# Patient Record
Sex: FEMALE | Race: WHITE | NOT HISPANIC OR LATINO | Employment: FULL TIME | URBAN - METROPOLITAN AREA
[De-identification: names, ages, dates, MRNs, and addresses within clinical notes are randomized per-mention and may not be internally consistent; named-entity substitution may affect disease eponyms.]

---

## 2017-03-23 ENCOUNTER — LAB REQUISITION (OUTPATIENT)
Dept: LAB | Facility: HOSPITAL | Age: 32
End: 2017-03-23
Payer: COMMERCIAL

## 2017-03-23 DIAGNOSIS — Z01.419 ENCOUNTER FOR GYNECOLOGICAL EXAMINATION WITHOUT ABNORMAL FINDING: ICD-10-CM

## 2017-03-23 DIAGNOSIS — Z11.51 ENCOUNTER FOR SCREENING FOR HUMAN PAPILLOMAVIRUS (HPV): ICD-10-CM

## 2017-03-23 PROCEDURE — G0145 SCR C/V CYTO,THINLAYER,RESCR: HCPCS | Performed by: OBSTETRICS & GYNECOLOGY

## 2017-03-30 LAB
LAB AP GYN PRIMARY INTERPRETATION: NORMAL
LAB AP LMP: NORMAL
Lab: NORMAL
PATH INTERP SPEC-IMP: NORMAL

## 2017-12-28 ENCOUNTER — ALLSCRIPTS OFFICE VISIT (OUTPATIENT)
Dept: OTHER | Facility: OTHER | Age: 32
End: 2017-12-28

## 2017-12-29 NOTE — PROGRESS NOTES
Assessment   1  Acute pharyngitis (462) (J02 9)    Plan   Acute pharyngitis    · Start: Amoxicillin 875 MG Oral Tablet; TAKE 1 TABLET EVERY 12 HOURS DAILY  Health Maintenance    · Temporarily Stop: Fluzone Quadrivalent 0 5 ML Intramuscular Suspension Prefilled    Syringe    Discussion/Summary      Discussed supportive measures and discussed with patient to call for any new or worsening symptoms  Possible side effects of new medications were reviewed with the patient/guardian today  The treatment plan was reviewed with the patient/guardian  The patient/guardian understands and agrees with the treatment plan      Chief Complaint   Sore throat      History of Present Illness   HPI: Patient here to establish care and reports that she is having sore throat and is prone to strep and usually getting twice a year she reports positive sick contacts with her son  Patient is able to swallow eating and drinking no n/v/d  Review of Systems        Constitutional: as noted in HPI       ENT: as noted in HPI  Cardiovascular: no complaints of slow or fast heart rate, no chest pain, no palpitations, no leg claudication or lower extremity edema  Respiratory: no complaints of shortness of breath, no wheezing, no dyspnea on exertion, no orthopnea or PND  Gastrointestinal: no complaints of abdominal pain, no constipation, no nausea or diarrhea, no vomiting, no bloody stools  ROS reviewed  Past Medical History   Active Problems And Past Medical History Reviewed: The active problems and past medical history were reviewed and updated today  Family History   Family History Reviewed: The family history was reviewed and updated today  Social History    · Never a smoker  The social history was reviewed and updated today  Surgical History   Surgical History Reviewed: The surgical history was reviewed and updated today  Current Meds   1   Lo Loestrin Fe 1 MG-10 MCG / 10 MCG Oral Tablet; take one tablet daily; Therapy: [id number] to (Last Cesar Hamm)  Requested for: [id number] Ordered     The medication list was reviewed and updated today  Allergies   1  No Known Drug Allergies    Vitals    Recorded: [id number] 01:57PM   Temperature 99 5 F   Heart Rate 88   Systolic 488   Diastolic 68   Height 5 ft 3 in   Weight 208 lb 8 0 oz   BMI Calculated 36 93   BSA Calculated 1 97   O2 Saturation 99     Physical Exam        Constitutional      General appearance: No acute distress, well appearing and well nourished  Eyes      Conjunctiva and lids: No swelling, erythema or discharge  Pupils and irises: Equal, round and reactive to light  Ears, Nose, Mouth, and Throat      External inspection of ears and nose: Normal        Otoscopic examination: Abnormal   The right tympanic membrane was bulging  The left tympanic membrane was bulging  Nasal mucosa, septum, and turbinates: Abnormal   normal nasal septum  There was a mucoid discharge from both nares  The bilateral nasal mucosa was boggy  Oropharynx: Abnormal   The posterior pharynx was erythematous-- and-- had an exudate  Inspection of the oropharynx showed visible hard and soft palate and base of the uvula (Mallampati class 3)  Pulmonary      Respiratory effort: No increased work of breathing or signs of respiratory distress  Auscultation of lungs: Clear to auscultation  Cardiovascular      Auscultation of heart: Normal rate and rhythm, normal S1 and S2, without murmurs  Abdomen      Abdomen: Non-tender, no masses  Liver and spleen: No hepatomegaly or splenomegaly  Lymphatic      Palpation of lymph nodes in neck: No lymphadenopathy         Psychiatric      Orientation to person, place, and time: Normal        Mood and affect: Normal           Signatures    Electronically signed by : Amanda Orellana; Dec 28 2017  2:39PM EST                       (Author)     Electronically signed by : Ja Mendoza MD; Dec 28 2017  3:51PM EST                       (Co-author)

## 2018-01-23 VITALS
OXYGEN SATURATION: 99 % | SYSTOLIC BLOOD PRESSURE: 122 MMHG | DIASTOLIC BLOOD PRESSURE: 68 MMHG | HEIGHT: 63 IN | WEIGHT: 208.5 LBS | TEMPERATURE: 99.5 F | BODY MASS INDEX: 36.94 KG/M2 | HEART RATE: 88 BPM

## 2018-01-29 ENCOUNTER — OFFICE VISIT (OUTPATIENT)
Dept: FAMILY MEDICINE CLINIC | Facility: CLINIC | Age: 33
End: 2018-01-29
Payer: COMMERCIAL

## 2018-01-29 VITALS
DIASTOLIC BLOOD PRESSURE: 82 MMHG | TEMPERATURE: 97.3 F | SYSTOLIC BLOOD PRESSURE: 118 MMHG | HEART RATE: 84 BPM | HEIGHT: 63 IN | OXYGEN SATURATION: 98 % | BODY MASS INDEX: 36.68 KG/M2 | WEIGHT: 207 LBS

## 2018-01-29 DIAGNOSIS — J04.0 LARYNGITIS, ACUTE: Primary | ICD-10-CM

## 2018-01-29 DIAGNOSIS — Z20.828 EXPOSURE TO INFLUENZA: ICD-10-CM

## 2018-01-29 LAB
SL AMB POCT RAPID FLU A: NORMAL
SL AMB POCT RAPID FLU B: NORMAL

## 2018-01-29 PROCEDURE — 99213 OFFICE O/P EST LOW 20 MIN: CPT | Performed by: FAMILY MEDICINE

## 2018-01-29 PROCEDURE — 87804 INFLUENZA ASSAY W/OPTIC: CPT | Performed by: FAMILY MEDICINE

## 2018-01-29 RX ORDER — OSELTAMIVIR PHOSPHATE 75 MG/1
75 CAPSULE ORAL DAILY
Qty: 7 CAPSULE | Refills: 0 | Status: SHIPPED | OUTPATIENT
Start: 2018-01-29 | End: 2018-02-05

## 2018-01-29 RX ORDER — AZITHROMYCIN 250 MG/1
TABLET, FILM COATED ORAL
Qty: 6 TABLET | Refills: 0 | Status: SHIPPED | OUTPATIENT
Start: 2018-01-29 | End: 2018-02-03

## 2018-01-29 NOTE — LETTER
January 29, 2018     Patient: Lucie Dior   YOB: 1985   Date of Visit: 1/29/2018       To Whom it May Concern:    Anish Garrido is under my professional care  She was seen in my office on 1/29/2018  She may return to work on 1/30/18  If you have any questions or concerns, please don't hesitate to call           Sincerely,          Judge Lynda MD        CC: Lucie Taboria

## 2018-01-29 NOTE — PROGRESS NOTES
Assessment/Plan:    No problem-specific Assessment & Plan notes found for this encounter  Diagnoses and all orders for this visit:    Laryngitis, acute  -     azithromycin (ZITHROMAX) 250 mg tablet; Take 2 tablets today then 1 tablet daily x 4 days    Exposure to influenza  -     oseltamivir (TAMIFLU) 75 mg capsule; Take 1 capsule (75 mg total) by mouth daily for 7 days        Laryngitis is viral   Rest voice, warm beverages  Zithromax if symptoms fail to improve  You do not need to take this now  She was exposed to flu - her child has it - will treat w/ prophylactic Tamiflu  Subjective:      Patient ID: Álvaro Brown is a 34 y o  female  Here for hoarseness  States 2 weeks ago was treated with Amoxicillin for strep throat  She did improve  She states she had some Amoxicillin left at home so she restarted this   Her son has influenza currently - was diagnosed this morning  Patient has no fever, cough  The following portions of the patient's history were reviewed and updated as appropriate: She  has no past medical history on file  She  does not have any pertinent problems on file  She  has no past surgical history on file  Her family history includes Asthma in her mother; Hypertension in her father  She  reports that she has never smoked  She has never used smokeless tobacco  Her alcohol and drug histories are not on file  Current Outpatient Prescriptions   Medication Sig Dispense Refill    azithromycin (ZITHROMAX) 250 mg tablet Take 2 tablets today then 1 tablet daily x 4 days 6 tablet 0    oseltamivir (TAMIFLU) 75 mg capsule Take 1 capsule (75 mg total) by mouth daily for 7 days 7 capsule 0     No current facility-administered medications for this visit  No current outpatient prescriptions on file prior to visit  No current facility-administered medications on file prior to visit  She has No Known Allergies       Review of Systems   Constitutional: Negative  HENT: Negative for rhinorrhea, sinus pain, sinus pressure and sore throat  Hoarseness   Respiratory: Negative for cough  Objective:     Physical Exam   Constitutional: She is oriented to person, place, and time  She appears well-developed and well-nourished  No distress  HENT:   Right Ear: External ear normal    Left Ear: External ear normal    Mouth/Throat: Oropharynx is clear and moist    Voice is hoarse   Eyes: Pupils are equal, round, and reactive to light  Cardiovascular: Normal rate, regular rhythm and normal heart sounds  Pulmonary/Chest: Effort normal and breath sounds normal  No respiratory distress  She has no wheezes  She has no rales  She exhibits no tenderness  Neurological: She is alert and oriented to person, place, and time  Skin: She is not diaphoretic  Psychiatric: She has a normal mood and affect   Her behavior is normal  Judgment and thought content normal

## 2018-02-05 ENCOUNTER — OFFICE VISIT (OUTPATIENT)
Dept: FAMILY MEDICINE CLINIC | Facility: CLINIC | Age: 33
End: 2018-02-05
Payer: COMMERCIAL

## 2018-02-05 VITALS
HEART RATE: 98 BPM | WEIGHT: 208.4 LBS | BODY MASS INDEX: 36.93 KG/M2 | SYSTOLIC BLOOD PRESSURE: 120 MMHG | RESPIRATION RATE: 16 BRPM | HEIGHT: 63 IN | DIASTOLIC BLOOD PRESSURE: 70 MMHG | OXYGEN SATURATION: 98 % | TEMPERATURE: 98.3 F

## 2018-02-05 DIAGNOSIS — R05.9 COUGH: ICD-10-CM

## 2018-02-05 DIAGNOSIS — J04.0 LARYNGITIS, ACUTE: Primary | ICD-10-CM

## 2018-02-05 PROCEDURE — 99213 OFFICE O/P EST LOW 20 MIN: CPT | Performed by: FAMILY MEDICINE

## 2018-02-05 PROCEDURE — 3008F BODY MASS INDEX DOCD: CPT | Performed by: FAMILY MEDICINE

## 2018-02-05 RX ORDER — DEXTROMETHORPHAN HYDROBROMIDE AND PROMETHAZINE HYDROCHLORIDE 15; 6.25 MG/5ML; MG/5ML
5 SYRUP ORAL 4 TIMES DAILY PRN
Qty: 118 ML | Refills: 2 | Status: SHIPPED | OUTPATIENT
Start: 2018-02-05 | End: 2018-02-12

## 2018-02-05 RX ORDER — FLUTICASONE PROPIONATE 50 MCG
1 SPRAY, SUSPENSION (ML) NASAL DAILY
Qty: 16 G | Refills: 0 | Status: SHIPPED | OUTPATIENT
Start: 2018-02-05 | End: 2018-10-08

## 2018-02-05 NOTE — PROGRESS NOTES
Assessment/Plan:    No problem-specific Assessment & Plan notes found for this encounter  Diagnoses and all orders for this visit:    Laryngitis, acute  -     fluticasone (FLONASE) 50 mcg/act nasal spray; 1 spray into each nostril daily  -     loratadine-pseudoephedrine (CLARITIN-D 12-HOUR) 5-120 mg per tablet; Take 1 tablet by mouth 2 (two) times a day for 5 days  -     promethazine-dextromethorphan (PHENERGAN-DM) 6 25-15 mg/5 mL oral syrup; Take 5 mL by mouth 4 (four) times a day as needed for cough for up to 7 days    Cough  -     fluticasone (FLONASE) 50 mcg/act nasal spray; 1 spray into each nostril daily  -     loratadine-pseudoephedrine (CLARITIN-D 12-HOUR) 5-120 mg per tablet; Take 1 tablet by mouth 2 (two) times a day for 5 days  -     promethazine-dextromethorphan (PHENERGAN-DM) 6 25-15 mg/5 mL oral syrup; Take 5 mL by mouth 4 (four) times a day as needed for cough for up to 7 days      After explaining risks and benefits of medication along with side effects I have prescribed for her Fluticasone nasal spray  Promethazine and Claritin D for symptoms relief  Given her on going symptoms I have recommend for her to take azithromycin  I have explained to her that a cough can linger for several weeks  Subjective:      Patient ID: Dimitris Almeida is a 34 y o  female  Cough  Patient complains of nonproductive cough and sore throat  Symptoms began 2 weeks ago  Symptoms have been gradually worsening since that time  The cough is dry, hoarse and nonproductive and is aggravated by nothing  Associated symptoms include: change in voice  Patient has not had a previous chest x-ray  She was evaluated on Friday here at this time office  Thus far she has been treated with a course of amoxicillin for her throat, given a course of Tamiflu and a script for azithromycin             The following portions of the patient's history were reviewed and updated as appropriate:   She  has no past medical history on file   She  does not have any pertinent problems on file  She  has no past surgical history on file  Her family history includes Asthma in her mother; Hypertension in her father  She  reports that she has never smoked  She has never used smokeless tobacco  Her alcohol and drug histories are not on file  Current Outpatient Prescriptions   Medication Sig Dispense Refill    fluticasone (FLONASE) 50 mcg/act nasal spray 1 spray into each nostril daily 16 g 0    loratadine-pseudoephedrine (CLARITIN-D 12-HOUR) 5-120 mg per tablet Take 1 tablet by mouth 2 (two) times a day for 5 days 10 tablet 0    oseltamivir (TAMIFLU) 75 mg capsule Take 1 capsule (75 mg total) by mouth daily for 7 days 7 capsule 0    promethazine-dextromethorphan (PHENERGAN-DM) 6 25-15 mg/5 mL oral syrup Take 5 mL by mouth 4 (four) times a day as needed for cough for up to 7 days 118 mL 2     No current facility-administered medications for this visit  Current Outpatient Prescriptions on File Prior to Visit   Medication Sig    oseltamivir (TAMIFLU) 75 mg capsule Take 1 capsule (75 mg total) by mouth daily for 7 days     No current facility-administered medications on file prior to visit  She has No Known Allergies       Review of Systems   Constitutional: Negative for activity change  HENT: Positive for sore throat  Eyes: Negative for discharge  Respiratory: Positive for cough  Cardiovascular: Negative for chest pain  Skin: Negative for rash  Allergic/Immunologic: Negative for environmental allergies  Objective:     Physical Exam   Constitutional: She is oriented to person, place, and time  She appears well-developed and well-nourished  No distress  HENT:   Head: Normocephalic and atraumatic  Nose: Nose normal    Mouth/Throat: Oropharynx is clear and moist  No oropharyngeal exudate  Bilateral TM effusion   Eyes: Conjunctivae are normal  Pupils are equal, round, and reactive to light     Cardiovascular: Normal rate, regular rhythm and normal heart sounds  No murmur heard  Pulmonary/Chest: Effort normal and breath sounds normal  No respiratory distress  She has no wheezes  Abdominal: Soft  Bowel sounds are normal  She exhibits no distension  There is no tenderness  Neurological: She is alert and oriented to person, place, and time  Skin: Skin is warm and dry  No rash noted  She is not diaphoretic  No erythema

## 2018-02-05 NOTE — LETTER
February 5, 2018     Patient: Akash Hathaway   YOB: 1985   Date of Visit: 2/5/2018       To Whom it May Concern:    Brooklyn Hung is under my professional care  She was seen in my office on 2/5/2018  She may return to work on 02/06/2018  If you have any questions or concerns, please don't hesitate to call           Sincerely,          Mila Berger MD        CC: No Recipients

## 2018-02-05 NOTE — LETTER
February 5, 2018     Patient: Yohannes Alan   YOB: 1985   Date of Visit: 2/5/2018       To Whom it May Concern:    Lana Rosa is under my professional care  She was seen in my office on 2/5/2018  She may return to work on 02/06/2018  If you have any questions or concerns, please don't hesitate to call           Sincerely,          Tony Ponce MD        CC: No Recipients

## 2018-10-08 ENCOUNTER — OFFICE VISIT (OUTPATIENT)
Dept: OBGYN CLINIC | Facility: MEDICAL CENTER | Age: 33
End: 2018-10-08
Payer: COMMERCIAL

## 2018-10-08 VITALS — DIASTOLIC BLOOD PRESSURE: 78 MMHG | BODY MASS INDEX: 36.7 KG/M2 | SYSTOLIC BLOOD PRESSURE: 138 MMHG | WEIGHT: 207.2 LBS

## 2018-10-08 DIAGNOSIS — Z01.419 ENCOUNTER FOR GYNECOLOGICAL EXAMINATION (GENERAL) (ROUTINE) WITHOUT ABNORMAL FINDINGS: Primary | ICD-10-CM

## 2018-10-08 PROCEDURE — S0612 ANNUAL GYNECOLOGICAL EXAMINA: HCPCS | Performed by: PHYSICIAN ASSISTANT

## 2018-10-08 NOTE — PROGRESS NOTES
Assessment/Plan  Problem List Items Addressed This Visit     Encounter for gynecological examination (general) (routine) without abnormal findings - Primary     Annual exam performed  Referred patient to [facility] since she has been trying for more than 3 years  RTO 1 year for annual or sooner if any questions about [facility]               Abigail Tillman is a 28 y o  female who presents for a new patient annual GYN exam   Periods are regular every 23-25 days  Dysmenorrhea:mild, occurring first 1-2 days of flow  She denies intermenstrual bleeding, spotting, or discharge  She reports that she is sexually active with 1 partner and using none for contraception  Patient states that she has been trying to conceive second child x 3 years  Patient with same partner and states that it only took her 3-5 months to conceive first child  Patient was doing ovulation tests and did get positive tests  Last Pap smear: per patient; 3/2017 pap WN on care everywhere  Regular self breast exam: no    Family history of uterine or ovarian cancer: no  Family history of breast cancer: no  Family history of colon cancer: no    Menstrual History:  OB History      Para Term  AB Living    2 1            SAB TAB Ectopic Multiple Live Births                     Obstetric Comments    1 miscarriage          Patient's last menstrual period was 2018 (exact date)  Period Pattern: (!) Irregular  Menstrual Flow: Heavy    History reviewed  No pertinent past medical history  History reviewed  No pertinent surgical history  Family History   Problem Relation Age of Onset    Asthma Mother     Hypertension Father     Hyperlipidemia Father        Review of Systems  Review of Systems   Constitutional: Negative for chills and fever  Respiratory: Negative for shortness of breath  Cardiovascular: Negative for chest pain  Gastrointestinal: Negative for abdominal pain     Genitourinary: Negative for dysuria, pelvic pain, vaginal bleeding, vaginal discharge and vaginal pain  Negative for breast pain or lumps  Negative for stress urinary incontinence  Neurological: Negative for headaches  Objective   /78 (BP Location: Right arm)   Wt 94 kg (207 lb 3 2 oz)   LMP 09/25/2018 (Exact Date)   BMI 36 70 kg/m²     Physical Exam   Constitutional: She is oriented to person, place, and time  She appears well-developed and well-nourished  Neck: No thyromegaly present  Cardiovascular: Normal rate and regular rhythm  Pulmonary/Chest: Effort normal and breath sounds normal  Right breast exhibits no mass, no nipple discharge, no skin change and no tenderness  Left breast exhibits no mass, no nipple discharge, no skin change and no tenderness  Abdominal: Soft  There is no tenderness  There is no rebound and no guarding  Genitourinary: There is no rash or lesion on the right labia  There is no rash or lesion on the left labia  Uterus is not enlarged and not tender  Cervix exhibits no motion tenderness and no discharge  Right adnexum displays no mass and no tenderness  Left adnexum displays no mass and no tenderness  No bleeding in the vagina  No vaginal discharge found  Neurological: She is alert and oriented to person, place, and time  Psychiatric: She has a normal mood and affect  Nursing note and vitals reviewed

## 2018-10-08 NOTE — ASSESSMENT & PLAN NOTE
Annual exam performed  Referred patient to [facility] since she has been trying for more than 3 years  RTO 1 year for annual or sooner if any questions about [facility]

## 2019-02-03 ENCOUNTER — HOSPITAL ENCOUNTER (EMERGENCY)
Facility: HOSPITAL | Age: 34
Discharge: HOME | End: 2019-02-03
Admitting: EMERGENCY MEDICINE
Payer: COMMERCIAL

## 2019-02-03 ENCOUNTER — APPOINTMENT (EMERGENCY)
Dept: CT IMAGING | Facility: HOSPITAL | Age: 34
End: 2019-02-03
Payer: COMMERCIAL

## 2019-02-03 VITALS
OXYGEN SATURATION: 97 % | HEIGHT: 63 IN | TEMPERATURE: 97.8 F | RESPIRATION RATE: 18 BRPM | HEART RATE: 64 BPM | SYSTOLIC BLOOD PRESSURE: 153 MMHG | BODY MASS INDEX: 35.44 KG/M2 | WEIGHT: 200 LBS | DIASTOLIC BLOOD PRESSURE: 87 MMHG

## 2019-02-03 DIAGNOSIS — M54.50 LOW BACK PAIN: ICD-10-CM

## 2019-02-03 DIAGNOSIS — R10.9 LEFT FLANK PAIN: Primary | ICD-10-CM

## 2019-02-03 LAB
ALBUMIN SERPL BCP-MCNC: 3.6 G/DL (ref 3.5–5)
ALP SERPL-CCNC: 67 U/L (ref 46–116)
ALT SERPL W P-5'-P-CCNC: 29 U/L (ref 12–78)
ANION GAP SERPL CALCULATED.3IONS-SCNC: 11 MMOL/L (ref 4–13)
AST SERPL W P-5'-P-CCNC: 20 U/L (ref 5–45)
BASOPHILS # BLD AUTO: 0.06 THOUSANDS/ΜL (ref 0–0.1)
BASOPHILS NFR BLD AUTO: 1 % (ref 0–1)
BILIRUB SERPL-MCNC: 0.2 MG/DL (ref 0.2–1)
BILIRUB UR QL STRIP: NEGATIVE
BUN SERPL-MCNC: 18 MG/DL (ref 5–25)
CALCIUM SERPL-MCNC: 9.1 MG/DL (ref 8.3–10.1)
CHLORIDE SERPL-SCNC: 104 MMOL/L (ref 100–108)
CLARITY UR: CLEAR
CO2 SERPL-SCNC: 24 MMOL/L (ref 21–32)
COLOR UR: YELLOW
CREAT SERPL-MCNC: 1.05 MG/DL (ref 0.6–1.3)
EOSINOPHIL # BLD AUTO: 0.36 THOUSAND/ΜL (ref 0–0.61)
EOSINOPHIL NFR BLD AUTO: 4 % (ref 0–6)
ERYTHROCYTE [DISTWIDTH] IN BLOOD BY AUTOMATED COUNT: 13.4 % (ref 11.6–15.1)
EXT PREG TEST URINE: NEGATIVE
GFR SERPL CREATININE-BSD FRML MDRD: 69 ML/MIN/1.73SQ M
GLUCOSE SERPL-MCNC: 107 MG/DL (ref 65–140)
GLUCOSE UR STRIP-MCNC: NEGATIVE MG/DL
HCT VFR BLD AUTO: 44.1 % (ref 34.8–46.1)
HGB BLD-MCNC: 14.5 G/DL (ref 11.5–15.4)
HGB UR QL STRIP.AUTO: NEGATIVE
IMM GRANULOCYTES # BLD AUTO: 0.05 THOUSAND/UL (ref 0–0.2)
IMM GRANULOCYTES NFR BLD AUTO: 1 % (ref 0–2)
KETONES UR STRIP-MCNC: NEGATIVE MG/DL
LEUKOCYTE ESTERASE UR QL STRIP: NEGATIVE
LYMPHOCYTES # BLD AUTO: 1.85 THOUSANDS/ΜL (ref 0.6–4.47)
LYMPHOCYTES NFR BLD AUTO: 20 % (ref 14–44)
MCH RBC QN AUTO: 30 PG (ref 26.8–34.3)
MCHC RBC AUTO-ENTMCNC: 32.9 G/DL (ref 31.4–37.4)
MCV RBC AUTO: 91 FL (ref 82–98)
MONOCYTES # BLD AUTO: 0.65 THOUSAND/ΜL (ref 0.17–1.22)
MONOCYTES NFR BLD AUTO: 7 % (ref 4–12)
NEUTROPHILS # BLD AUTO: 6.29 THOUSANDS/ΜL (ref 1.85–7.62)
NEUTS SEG NFR BLD AUTO: 67 % (ref 43–75)
NITRITE UR QL STRIP: NEGATIVE
NRBC BLD AUTO-RTO: 0 /100 WBCS
PH UR STRIP.AUTO: 5 [PH] (ref 4.5–8)
PLATELET # BLD AUTO: 293 THOUSANDS/UL (ref 149–390)
PMV BLD AUTO: 10 FL (ref 8.9–12.7)
POTASSIUM SERPL-SCNC: 4.2 MMOL/L (ref 3.5–5.3)
PROT SERPL-MCNC: 7.8 G/DL (ref 6.4–8.2)
PROT UR STRIP-MCNC: NEGATIVE MG/DL
RBC # BLD AUTO: 4.83 MILLION/UL (ref 3.81–5.12)
SODIUM SERPL-SCNC: 139 MMOL/L (ref 136–145)
SP GR UR STRIP.AUTO: >=1.03 (ref 1–1.03)
UROBILINOGEN UR QL STRIP.AUTO: 0.2 E.U./DL
WBC # BLD AUTO: 9.26 THOUSAND/UL (ref 4.31–10.16)

## 2019-02-03 PROCEDURE — 80053 COMPREHEN METABOLIC PANEL: CPT | Performed by: NURSE PRACTITIONER

## 2019-02-03 PROCEDURE — 99284 EMERGENCY DEPT VISIT MOD MDM: CPT

## 2019-02-03 PROCEDURE — 81003 URINALYSIS AUTO W/O SCOPE: CPT | Performed by: NURSE PRACTITIONER

## 2019-02-03 PROCEDURE — 74176 CT ABD & PELVIS W/O CONTRAST: CPT

## 2019-02-03 PROCEDURE — 81025 URINE PREGNANCY TEST: CPT | Performed by: NURSE PRACTITIONER

## 2019-02-03 PROCEDURE — 36415 COLL VENOUS BLD VENIPUNCTURE: CPT | Performed by: NURSE PRACTITIONER

## 2019-02-03 PROCEDURE — 96375 TX/PRO/DX INJ NEW DRUG ADDON: CPT

## 2019-02-03 PROCEDURE — 96374 THER/PROPH/DIAG INJ IV PUSH: CPT

## 2019-02-03 PROCEDURE — 85025 COMPLETE CBC W/AUTO DIFF WBC: CPT | Performed by: NURSE PRACTITIONER

## 2019-02-03 PROCEDURE — 96361 HYDRATE IV INFUSION ADD-ON: CPT

## 2019-02-03 RX ORDER — HYDROCODONE BITARTRATE AND ACETAMINOPHEN 5; 325 MG/1; MG/1
1 TABLET ORAL EVERY 6 HOURS PRN
Qty: 20 TABLET | Refills: 0 | Status: SHIPPED | OUTPATIENT
Start: 2019-02-03 | End: 2019-04-16 | Stop reason: ALTCHOICE

## 2019-02-03 RX ORDER — NAPROXEN 500 MG/1
500 TABLET ORAL 2 TIMES DAILY WITH MEALS
Qty: 60 TABLET | Refills: 0 | Status: SHIPPED | OUTPATIENT
Start: 2019-02-03 | End: 2019-04-16 | Stop reason: ALTCHOICE

## 2019-02-03 RX ORDER — MORPHINE SULFATE 4 MG/ML
4 INJECTION, SOLUTION INTRAMUSCULAR; INTRAVENOUS ONCE
Status: COMPLETED | OUTPATIENT
Start: 2019-02-03 | End: 2019-02-03

## 2019-02-03 RX ORDER — KETOROLAC TROMETHAMINE 30 MG/ML
15 INJECTION, SOLUTION INTRAMUSCULAR; INTRAVENOUS ONCE
Status: COMPLETED | OUTPATIENT
Start: 2019-02-03 | End: 2019-02-03

## 2019-02-03 RX ORDER — LIDOCAINE 50 MG/G
1 PATCH TOPICAL ONCE
Status: DISCONTINUED | OUTPATIENT
Start: 2019-02-03 | End: 2019-02-03 | Stop reason: HOSPADM

## 2019-02-03 RX ORDER — LIDOCAINE 50 MG/G
1 PATCH TOPICAL DAILY
Qty: 6 PATCH | Refills: 0 | Status: SHIPPED | OUTPATIENT
Start: 2019-02-03 | End: 2019-04-16 | Stop reason: ALTCHOICE

## 2019-02-03 RX ORDER — METHOCARBAMOL 500 MG/1
500 TABLET, FILM COATED ORAL 2 TIMES DAILY
Qty: 60 TABLET | Refills: 0 | Status: SHIPPED | OUTPATIENT
Start: 2019-02-03 | End: 2019-04-16 | Stop reason: ALTCHOICE

## 2019-02-03 RX ORDER — ONDANSETRON 2 MG/ML
4 INJECTION INTRAMUSCULAR; INTRAVENOUS ONCE
Status: COMPLETED | OUTPATIENT
Start: 2019-02-03 | End: 2019-02-03

## 2019-02-03 RX ADMIN — KETOROLAC TROMETHAMINE 15 MG: 30 INJECTION, SOLUTION INTRAMUSCULAR at 07:30

## 2019-02-03 RX ADMIN — LIDOCAINE 1 PATCH: 50 PATCH TOPICAL at 11:00

## 2019-02-03 RX ADMIN — MORPHINE SULFATE 4 MG: 4 INJECTION INTRAVENOUS at 07:30

## 2019-02-03 RX ADMIN — ONDANSETRON 4 MG: 2 INJECTION INTRAMUSCULAR; INTRAVENOUS at 07:30

## 2019-02-03 RX ADMIN — SODIUM CHLORIDE 1000 ML: 0.9 INJECTION, SOLUTION INTRAVENOUS at 07:30

## 2019-02-03 NOTE — ED PROVIDER NOTES
History  Chief Complaint   Patient presents with    Flank Pain     Patient c/o left sided flank pain that started Thursday night     27-year-old female who presents here with a chief complaint of left-sided flank pain that started yesterday evening  She denies any injury but reports that the pain waxes and wanes  She has a history of low back issues reportedly but this feels different than usual no nausea no vomiting denies any genitourinary complaints  Has some family history of kidney stones so we will scan her to evaluate for kidney stones she does have some left CVA tenderness  None       History reviewed  No pertinent past medical history  History reviewed  No pertinent surgical history  Family History   Problem Relation Age of Onset    Asthma Mother     Hypertension Father     Hyperlipidemia Father      I have reviewed and agree with the history as documented  Social History   Substance Use Topics    Smoking status: Never Smoker    Smokeless tobacco: Never Used    Alcohol use No        Review of Systems   Constitutional: Negative for activity change, fatigue and fever  HENT: Negative for congestion, ear pain, rhinorrhea and sore throat  Eyes: Negative  Respiratory: Negative for cough, shortness of breath and wheezing  Gastrointestinal: Positive for abdominal pain  Negative for diarrhea, nausea and vomiting  Endocrine: Negative  Genitourinary: Negative for difficulty urinating, dyspareunia, dysuria, flank pain, frequency, menstrual problem, pelvic pain, urgency, vaginal bleeding, vaginal discharge and vaginal pain  Musculoskeletal: Negative for arthralgias and myalgias  Skin: Negative for color change and pallor  Neurological: Negative for dizziness, speech difficulty, weakness and headaches  Hematological: Negative for adenopathy  Psychiatric/Behavioral: Negative for confusion         Physical Exam  Physical Exam   Constitutional: She is oriented to person, place, and time  She appears well-developed and well-nourished  She is cooperative  Non-toxic appearance  She does not have a sickly appearance  She does not appear ill  No distress  HENT:   Head: Normocephalic and atraumatic  Right Ear: Tympanic membrane and external ear normal    Left Ear: Tympanic membrane and external ear normal    Nose: No rhinorrhea, sinus tenderness or nasal deformity  No epistaxis  Right sinus exhibits no maxillary sinus tenderness and no frontal sinus tenderness  Left sinus exhibits no maxillary sinus tenderness and no frontal sinus tenderness  Mouth/Throat: Oropharynx is clear and moist and mucous membranes are normal  Normal dentition  Eyes: Pupils are equal, round, and reactive to light  EOM are normal    Neck: Normal range of motion  Neck supple  Cardiovascular: Normal rate, regular rhythm and normal heart sounds  No murmur heard  Pulmonary/Chest: Effort normal and breath sounds normal  No accessory muscle usage  No respiratory distress  She has no wheezes  She has no rales  She exhibits no tenderness  Abdominal: Soft  She exhibits no distension  There is tenderness  There is CVA tenderness  There is no guarding  Musculoskeletal: Normal range of motion  She exhibits no edema or tenderness  Lymphadenopathy:     She has no cervical adenopathy  Neurological: She is alert and oriented to person, place, and time  She exhibits normal muscle tone  Skin: Skin is warm and dry  No rash noted  No erythema  Psychiatric: She has a normal mood and affect  Nursing note and vitals reviewed        Vital Signs  ED Triage Vitals   Temperature Pulse Respirations Blood Pressure SpO2   02/03/19 0648 02/03/19 0648 02/03/19 0648 02/03/19 0648 02/03/19 0648   97 8 °F (36 6 °C) 68 18 (!) 172/112 99 %      Temp Source Heart Rate Source Patient Position - Orthostatic VS BP Location FiO2 (%)   02/03/19 0648 02/03/19 0648 02/03/19 0648 02/03/19 0648 --   Oral Monitor Sitting Right arm       Pain Score       02/03/19 0730       8           Vitals:    02/03/19 0648 02/03/19 0700 02/03/19 0857 02/03/19 1025   BP: (!) 172/112 (!) 172/112 139/81 153/87   Pulse: 68  71 64   Patient Position - Orthostatic VS: Sitting  Sitting Sitting       Visual Acuity      ED Medications  Medications   sodium chloride 0 9 % bolus 1,000 mL (0 mL Intravenous Stopped 2/3/19 1058)   ketorolac (TORADOL) injection 15 mg (15 mg Intravenous Given 2/3/19 0730)   morphine (PF) 4 mg/mL injection 4 mg (4 mg Intravenous Given 2/3/19 0730)   ondansetron (ZOFRAN) injection 4 mg (4 mg Intravenous Given 2/3/19 0730)       Diagnostic Studies  Results Reviewed     Procedure Component Value Units Date/Time    UA w Reflex to Microscopic w Reflex to Culture [[id number]] Collected:  02/03/19 0856    Lab Status:  Final result Specimen:  Urine from Urine, Clean Catch Updated:  02/03/19 0903     Color, UA Yellow     Clarity, UA Clear     Specific Gravity, UA >=1 030     pH, UA 5 0     Leukocytes, UA Negative     Nitrite, UA Negative     Protein, UA Negative mg/dl      Glucose, UA Negative mg/dl      Ketones, UA Negative mg/dl      Urobilinogen, UA 0 2 E U /dl      Bilirubin, UA Negative     Blood, UA Negative    POCT pregnancy, urine [[id number]]  (Normal) Resulted:  02/03/19 0901    Lab Status:  Final result Updated:  02/03/19 0901     EXT PREG TEST UR (Ref: Negative) negative    Comprehensive metabolic panel [[id number]] Collected:  02/03/19 0804    Lab Status:  Final result Specimen:  Blood from Arm, Right Updated:  02/03/19 0827     Sodium 139 mmol/L      Potassium 4 2 mmol/L      Chloride 104 mmol/L      CO2 24 mmol/L      ANION GAP 11 mmol/L      BUN 18 mg/dL      Creatinine 1 05 mg/dL      Glucose 107 mg/dL      Calcium 9 1 mg/dL      AST 20 U/L      ALT 29 U/L      Alkaline Phosphatase 67 U/L      Total Protein 7 8 g/dL      Albumin 3 6 g/dL      Total Bilirubin 0 20 mg/dL      eGFR 69 ml/min/1 73sq m     Narrative:         Consolidated [employer] Education [employer] recommendations are as follows:  GFR calculation is accurate only with a steady state creatinine  Chronic Kidney disease less than 60 ml/min/1 73 sq  meters  Kidney failure less than 15 ml/min/1 73 sq  meters  CBC and differential [[id number]] Collected:  02/03/19 0727    Lab Status:  Final result Specimen:  Blood from Arm, Right Updated:  02/03/19 0733     WBC 9 26 Thousand/uL      RBC 4 83 Million/uL      Hemoglobin 14 5 g/dL      Hematocrit 44 1 %      MCV 91 fL      MCH 30 0 pg      MCHC 32 9 g/dL      RDW 13 4 %      MPV 10 0 fL      Platelets 889 Thousands/uL      nRBC 0 /100 WBCs      Neutrophils Relative 67 %      Immat GRANS % 1 %      Lymphocytes Relative 20 %      Monocytes Relative 7 %      Eosinophils Relative 4 %      Basophils Relative 1 %      Neutrophils Absolute 6 29 Thousands/µL      Immature Grans Absolute 0 05 Thousand/uL      Lymphocytes Absolute 1 85 Thousands/µL      Monocytes Absolute 0 65 Thousand/µL      Eosinophils Absolute 0 36 Thousand/µL      Basophils Absolute 0 06 Thousands/µL                  CT renal stone study abdomen pelvis without contrast   Final Result by Nancy Cavanaugh DO (02/03 0957)   No obstructing renal calculi  Workstation performed: [id number]                    Procedures  Procedures       Phone Contacts  ED Phone Contact    ED Course                               MDM  Number of Diagnoses or Management Options  Left flank pain: new and requires workup  Low back pain: new and requires workup  Diagnosis management comments: CT findings discussed with the patient  She was provided a copy of the report  On repeat physical examination she does have palpable tenderness over the left lower back musculature so it is most likely spasm that has caused her discomfort         Amount and/or Complexity of Data Reviewed  Clinical lab tests: reviewed and ordered  Tests in the radiology section of CPT®: reviewed and ordered  Independent visualization of images, tracings, or specimens: yes    Patient Progress  Patient progress: improved      Disposition  Final diagnoses:   Left flank pain   Low back pain     Time reflects when diagnosis was documented in both MDM as applicable and the Disposition within this note     Time User Action Codes Description Comment    2/3/2019 10:51 AM Rogenia Getting Add [R10 9] Left flank pain     2/3/2019 10:51 AM Rogenia Getting Add [M54 5] Low back pain       ED Disposition     ED Disposition Condition Date/Time Comment    Discharge  Sun Feb 3, 2019 10:51 AM Kaylen Herzog discharge to home/self care  Condition at discharge: Stable        Follow-up Information     Follow up With Specialties Details [address], [address] [facility], Nurse Practitioner  For Continued Evaluation [address] [facility],[address]  [facility]  [phone]            Discharge Medication List as of 2/3/2019 10:55 AM      START taking these medications    Details   HYDROcodone-acetaminophen (NORCO) 5-325 mg per tablet Take 1 tablet by mouth every 6 (six) hours as needed (Not relieved by Anti-inflammatory) for up to 20 doses Max Daily Amount: 4 tablets, Starting Sun 2/3/2019, Print      lidocaine (LIDODERM) 5 % Apply 1 patch topically daily for 6 days Remove & Discard patch within 12 hours or as directed by MD, Starting Sun 2/3/2019, Until Sat 2/9/2019, Print      methocarbamol (ROBAXIN) 500 mg tablet Take 1 tablet (500 mg total) by mouth 2 (two) times a day for 60 doses, Starting Sun 2/3/2019, Until Tue 3/5/2019, Print      naproxen (NAPROSYN) 500 mg tablet Take 1 tablet (500 mg total) by mouth 2 (two) times a day with meals for 60 doses, Starting Sun 2/3/2019, Until Tue 3/5/2019, Print           No discharge procedures on file      ED Provider  Electronically Signed by           ISHMAEL Nicolas  02/03/19 1058

## 2019-02-03 NOTE — DISCHARGE INSTRUCTIONS
Abdominal Pain, Ambulatory Care   GENERAL INFORMATION:   Abdominal pain  can be dull, achy, or sharp  You may have pain in one area of your abdomen, or in your entire abdomen  Your pain may be caused by constipation, food sensitivity or poisoning, infection, or a blockage  Abdominal pain can also be caused by a hernia, appendicitis, or an ulcer  The cause of your abdominal pain may be unknown  Seek immediate care for the following symptoms:   · New chest pain or shortness of breath    · Pulsing pain in your upper abdomen or lower back that suddenly becomes constant    · Pain in the right lower abdominal area that worsens with movement    · Fever over 100 4°F (38°C) or shaking chills    · Vomiting and you cannot keep food or fluids down    · Pain does not improve or gets worse over the next 8 to 12 hours    · Blood in your vomit or bowel movements, or they look black and tarry    · Skin or the whites of your eyes turn yellow    · Large amount of vaginal bleeding that is not your monthly period  Treatment for abdominal pain  may include medicine to calm your stomach, prevent vomiting, or decrease pain  Follow up with your healthcare provider as directed:  Write down your questions so you remember to ask them during your visits  CARE AGREEMENT:   You have the right to help plan your care  Learn about your health condition and how it may be treated  Discuss treatment options with your caregivers to decide what care you want to receive  You always have the right to refuse treatment  The above information is an  only  It is not intended as medical advice for individual conditions or treatments  Talk to your doctor, nurse or pharmacist before following any medical regimen to see if it is safe and effective for you  © 2014 [address] is for End User's use only and may not be sold, redistributed or otherwise used for commercial purposes   All illustrations and images included in [facility] are the copyrighted property of A D A M , Inc  or Henri Fuller  Acute Low Back Pain   WHAT YOU NEED TO KNOW:   Acute low back pain is sudden discomfort in your lower back area that lasts for up to 6 weeks  The discomfort makes it difficult to tolerate activity  DISCHARGE INSTRUCTIONS:   Return to the emergency department if:   · You have severe pain  · You have sudden stiffness and heaviness on both buttocks down to both legs  · You have numbness or weakness in one leg, or pain in both legs  · You have numbness in your genital area or across your lower back  · You cannot control your urine or bowel movements  Contact your healthcare provider if:   · You have a fever  · You have pain at night or when you rest     · Your pain does not get better with treatment  · You have pain that worsens when you cough or sneeze  · You suddenly feel something pop or snap in your back  · You have questions or concerns about your condition or care  Medicines: The following medicines may be ordered by your healthcare provider:  · Acetaminophen  decreases pain  It is available without a doctor's order  Ask how much to take and how often to take it  Follow directions  Acetaminophen can cause liver damage if not taken correctly  · NSAIDs  help decrease swelling and pain  This medicine is available with or without a doctor's order  NSAIDs can cause stomach bleeding or kidney problems in certain people  If you take blood thinner medicine, always ask your healthcare provider if NSAIDs are safe for you  Always read the medicine label and follow directions  · Prescription pain medicine  may be given  Ask your healthcare provider how to take this medicine safely  · Muscle relaxers  decrease pain by relaxing the muscles in your lower spine  · Take your medicine as directed    Contact your healthcare provider if you think your medicine is not helping or if you have side effects  Tell him of her if you are allergic to any medicine  Keep a list of the medicines, vitamins, and herbs you take  Include the amounts, and when and why you take them  Bring the list or the pill bottles to follow-up visits  Carry your medicine list with you in case of an emergency  Self-care:   · Stay active  as much as you can without causing more pain  Bed rest could make your back pain worse  Start with some light exercises such as walking  Avoid heavy lifting until your pain is gone  Ask for more information about the activities or exercises that are right for you  · Ice  helps decrease swelling, pain, and muscle spams  Put crushed ice in a plastic bag  Cover it with a towel  Place it on your lower back for 20 to 30 minutes every 2 hours  Do this for about 2 to 3 days after your pain starts, or as directed  · Heat  helps decrease pain and muscle spasms  Start to use heat after treatment with ice has stopped  Use a small towel dampened with warm water or a heating pad, or sit in a warm bath  Apply heat on the area for 20 to 30 minutes every 2 hours for as many days as directed  Alternate heat and ice  Prevent acute low back pain:   · Use proper body mechanics  ¨ Bend at the hips and knees when you  objects  Do not bend from the waist  Use your leg muscles as you lift the load  Do not use your back  Keep the object close to your chest as you lift it  Try not to twist or lift anything above your waist     ¨ Change your position often when you stand for long periods of time  Rest one foot on a small box or footrest, and then switch to the other foot often  ¨ Try not to sit for long periods of time  When you do, sit in a straight-backed chair with your feet flat on the floor  Never reach, pull, or push while you are sitting  · Do exercises that strengthen your back muscles  Warm up before you exercise  Ask your healthcare provider the best exercises for you      · Maintain a healthy weight  Ask your healthcare provider how much you should weigh  Ask him to help you create a weight loss plan if you are overweight  Follow up with your healthcare provider as directed:  Return for a follow-up visit if you still have pain after 1 to 3 weeks of treatment  You may need to visit an orthopedist if your back pain lasts more than 12 weeks  Write down your questions so you remember to ask them during your visits  © 2017 [address] Information is for End User's use only and may not be sold, redistributed or otherwise used for commercial purposes  All illustrations and images included in CareNotes® are the copyrighted property of A D A M , Inc  or Henri Fuller  The above information is an  only  It is not intended as medical advice for individual conditions or treatments  Talk to your doctor, nurse or pharmacist before following any medical regimen to see if it is safe and effective for you

## 2019-02-05 ENCOUNTER — VBI (OUTPATIENT)
Dept: ADMINISTRATIVE | Facility: OTHER | Age: 34
End: 2019-02-05

## 2019-02-05 NOTE — TELEPHONE ENCOUNTER
Jordy Caro    ED Visit Information     Ed visit date: 2/3/19  Diagnosis Description: Left flank pain; Low back pain  In Network? Yes [facility]  Discharge status: Home  Discharged with meds ? Yes  Number of ED visits to date: 1  ED Severity:3     Outreach Information    Outreach successful: Yes 1  Date letter mailed:0  Date Finalized:2/5/19    Care Coordination    Follow up appointment with specialilty: yes Chiropractor 2/4/19-2/5/19   Transportation issues ? No    [employer] type:  3 Day Outreach  Emergent necessity warranted by diagnosis:  No  [facility] PCP:  Yes  Transportation:  Self Transport  Called PCP first?:  No  Feels able to call PCP for urgent problems ?:  Yes  Understands what emergencies can be handled by PCP ?:  Yes  Ever any problems getting appointment with PCP for minor emergency/urgency problems?:  No  Practice Contacted Patient ?:  No  Pt had ED follow up with pcp/staff ?:  No    Reason Patient went to ED instead of Urgent Care or PCP?:  Perceived Severity of Illness  Urgent care Education?:  Yes  Spoke to Nilda Mratínez, today she stated she is being f/u by the Chiropractor and declined follo up with PCP,  She did receive medications script post d/c from ED doctor  Nilda Martínez stated [facility]; but due to the pain she went to ED( as well as the time of day  and  it being a Sunday)

## 2019-02-14 ENCOUNTER — OFFICE VISIT (OUTPATIENT)
Dept: FAMILY MEDICINE CLINIC | Facility: CLINIC | Age: 34
End: 2019-02-14
Payer: COMMERCIAL

## 2019-02-14 VITALS
SYSTOLIC BLOOD PRESSURE: 128 MMHG | BODY MASS INDEX: 35.97 KG/M2 | WEIGHT: 203 LBS | DIASTOLIC BLOOD PRESSURE: 80 MMHG | HEIGHT: 63 IN

## 2019-02-14 DIAGNOSIS — K42.9 UMBILICAL HERNIA WITHOUT OBSTRUCTION AND WITHOUT GANGRENE: ICD-10-CM

## 2019-02-14 DIAGNOSIS — H10.32 ACUTE CONJUNCTIVITIS OF LEFT EYE, UNSPECIFIED ACUTE CONJUNCTIVITIS TYPE: Primary | ICD-10-CM

## 2019-02-14 PROCEDURE — 3008F BODY MASS INDEX DOCD: CPT | Performed by: FAMILY MEDICINE

## 2019-02-14 PROCEDURE — 99214 OFFICE O/P EST MOD 30 MIN: CPT | Performed by: FAMILY MEDICINE

## 2019-02-14 PROCEDURE — 1036F TOBACCO NON-USER: CPT | Performed by: FAMILY MEDICINE

## 2019-02-14 RX ORDER — AZELASTINE HYDROCHLORIDE 0.5 MG/ML
1 SOLUTION/ DROPS OPHTHALMIC 2 TIMES DAILY
Qty: 6 ML | Refills: 1 | Status: SHIPPED | OUTPATIENT
Start: 2019-02-14 | End: 2019-04-16 | Stop reason: ALTCHOICE

## 2019-02-14 NOTE — PROGRESS NOTES
Assessment/Plan:    No problem-specific Assessment & Plan notes found for this encounter  Diagnoses and all orders for this visit:    Acute conjunctivitis of left eye, unspecified acute conjunctivitis type  After discussing risks and benefits of medication along with side effects will start antibiotic eyedrops as well as anti inflammatory eyedrops  She was advised if symptoms do not improve in the next 3-4 days to follow-up with Ophthalmology  She was also advised if sh develops severe eye pain or worsening of symptoms to go to the emergency room    Umbilical hernia without obstruction and without gangrene  advised to monitor at this time if any pain, nausea or vomiting advised to follow up with general surgeon for evaluation  Follow up in 1 week or as needed        Subjective:      Patient ID: Yohannes Alan is a 28 y o  female  Conjunctivitis  Patient presents for evaluation of discharge, erythema, foreign body sensation, itching, photophobia and tearing in the right eye  She has noticed the above symptoms for a few days  Onset was sudden  Patient denies blurred vision and visual field deficit  She went to the ER 10 days ago and a CT scan done which showed a fat containing umbilical hernia she denies any symptoms such as abdominal pain, nausea, vomiting related to this  The following portions of the patient's history were reviewed and updated as appropriate:   She  has no past medical history on file  She   Patient Active Problem List    Diagnosis Date Noted    Acute conjunctivitis of left eye [id number]/52/1438    Umbilical hernia without obstruction and without gangrene 02/14/2019    Encounter for gynecological examination (general) (routine) without abnormal findings 10/08/2018    Cough 02/05/2018    Laryngitis, acute 01/29/2018    Exposure to influenza 01/29/2018     She  has no past surgical history on file    Her family history includes Asthma in her mother; Hyperlipidemia in her father; Hypertension in her father  She  reports that she has never smoked  She has never used smokeless tobacco  She reports that she does not drink alcohol or use drugs  Current Outpatient Medications   Medication Sig Dispense Refill    HYDROcodone-acetaminophen (NORCO) 5-325 mg per tablet Take 1 tablet by mouth every 6 (six) hours as needed (Not relieved by Anti-inflammatory) for up to 20 doses Max Daily Amount: 4 tablets (Patient not taking: Reported on 2/14/2019) 20 tablet 0    lidocaine (LIDODERM) 5 % Apply 1 patch topically daily for 6 days Remove & Discard patch within 12 hours or as directed by MD Brewer patch 0    methocarbamol (ROBAXIN) 500 mg tablet Take 1 tablet (500 mg total) by mouth 2 (two) times a day for 60 doses (Patient not taking: Reported on 2/14/2019) 60 tablet 0    naproxen (NAPROSYN) 500 mg tablet Take 1 tablet (500 mg total) by mouth 2 (two) times a day with meals for 60 doses (Patient not taking: Reported on 2/14/2019) 60 tablet 0     No current facility-administered medications for this visit  Current Outpatient Medications on File Prior to Visit   Medication Sig    HYDROcodone-acetaminophen (NORCO) 5-325 mg per tablet Take 1 tablet by mouth every 6 (six) hours as needed (Not relieved by Anti-inflammatory) for up to 20 doses Max Daily Amount: 4 tablets (Patient not taking: Reported on 2/14/2019)    lidocaine (LIDODERM) 5 % Apply 1 patch topically daily for 6 days Remove & Discard patch within 12 hours or as directed by MD    methocarbamol (ROBAXIN) 500 mg tablet Take 1 tablet (500 mg total) by mouth 2 (two) times a day for 60 doses (Patient not taking: Reported on 2/14/2019)    naproxen (NAPROSYN) 500 mg tablet Take 1 tablet (500 mg total) by mouth 2 (two) times a day with meals for 60 doses (Patient not taking: Reported on 2/14/2019)     No current facility-administered medications on file prior to visit  She has No Known Allergies       Review of Systems   Constitutional: Negative for activity change, appetite change, fatigue and fever  HENT: Negative for congestion and ear discharge  Eyes: Positive for photophobia, pain, discharge and redness  Respiratory: Negative for cough and shortness of breath  Cardiovascular: Negative for chest pain and palpitations  Gastrointestinal: Negative for diarrhea and nausea  Musculoskeletal: Negative for arthralgias and back pain  Skin: Negative for color change and rash  Neurological: Negative for dizziness and headaches  Psychiatric/Behavioral: Negative for agitation and behavioral problems  Objective:      /80   Ht 5' 3" (1 6 m)   Wt 92 1 kg (203 lb)   LMP 02/03/2019   BMI 35 96 kg/m²          Physical Exam   Constitutional: She is oriented to person, place, and time  She appears well-developed and well-nourished  No distress  HENT:   Head: Normocephalic and atraumatic  Nose: Nose normal    Mouth/Throat: Oropharynx is clear and moist    Eyes: Pupils are equal, round, and reactive to light  Right eye exhibits discharge  Left eye exhibits no discharge  Right conjunctiva is injected  Left conjunctiva is not injected  Right eye exhibits normal extraocular motion and no nystagmus  Left eye exhibits normal extraocular motion and no nystagmus  Cardiovascular: Normal rate, regular rhythm and normal heart sounds  No murmur heard  Pulmonary/Chest: Effort normal and breath sounds normal  No respiratory distress  She has no wheezes  Abdominal: Soft  Bowel sounds are normal  She exhibits no distension and no mass  There is no tenderness  There is no rebound and no guarding  No hernia  Neurological: She is alert and oriented to person, place, and time  Skin: Skin is warm and dry  No rash noted  She is not diaphoretic  No erythema  Psychiatric: She has a normal mood and affect

## 2019-04-16 ENCOUNTER — OFFICE VISIT (OUTPATIENT)
Dept: FAMILY MEDICINE CLINIC | Facility: CLINIC | Age: 34
End: 2019-04-16
Payer: COMMERCIAL

## 2019-04-16 VITALS
SYSTOLIC BLOOD PRESSURE: 126 MMHG | WEIGHT: 208.6 LBS | TEMPERATURE: 99.4 F | DIASTOLIC BLOOD PRESSURE: 78 MMHG | BODY MASS INDEX: 36.96 KG/M2 | HEART RATE: 102 BPM | RESPIRATION RATE: 16 BRPM | OXYGEN SATURATION: 98 % | HEIGHT: 63 IN

## 2019-04-16 DIAGNOSIS — J01.00 ACUTE NON-RECURRENT MAXILLARY SINUSITIS: Primary | ICD-10-CM

## 2019-04-16 DIAGNOSIS — R05.9 COUGH: ICD-10-CM

## 2019-04-16 PROBLEM — J04.0 LARYNGITIS, ACUTE: Status: RESOLVED | Noted: 2018-01-29 | Resolved: 2019-04-16

## 2019-04-16 PROBLEM — H10.32 ACUTE CONJUNCTIVITIS OF LEFT EYE: Status: RESOLVED | Noted: 2019-02-14 | Resolved: 2019-04-16

## 2019-04-16 PROCEDURE — 1036F TOBACCO NON-USER: CPT | Performed by: NURSE PRACTITIONER

## 2019-04-16 PROCEDURE — 3008F BODY MASS INDEX DOCD: CPT | Performed by: NURSE PRACTITIONER

## 2019-04-16 PROCEDURE — 99213 OFFICE O/P EST LOW 20 MIN: CPT | Performed by: NURSE PRACTITIONER

## 2019-04-16 RX ORDER — AZITHROMYCIN 250 MG/1
TABLET, FILM COATED ORAL
Qty: 6 TABLET | Refills: 0 | Status: SHIPPED | OUTPATIENT
Start: 2019-04-16 | End: 2019-04-20

## 2019-04-16 RX ORDER — PREDNISONE 20 MG/1
TABLET ORAL
Qty: 18 TABLET | Refills: 0 | Status: SHIPPED | OUTPATIENT
Start: 2019-04-16 | End: 2019-07-11 | Stop reason: CLARIF

## 2019-04-25 ENCOUNTER — TELEPHONE (OUTPATIENT)
Dept: FAMILY MEDICINE CLINIC | Facility: CLINIC | Age: 34
End: 2019-04-25

## 2019-04-25 DIAGNOSIS — J01.00 ACUTE NON-RECURRENT MAXILLARY SINUSITIS: Primary | ICD-10-CM

## 2019-04-25 DIAGNOSIS — R05.9 COUGH: ICD-10-CM

## 2019-04-25 RX ORDER — AMOXICILLIN AND CLAVULANATE POTASSIUM 875; 125 MG/1; MG/1
1 TABLET, FILM COATED ORAL EVERY 12 HOURS SCHEDULED
Qty: 14 TABLET | Refills: 0 | Status: SHIPPED | OUTPATIENT
Start: 2019-04-25 | End: 2019-05-02

## 2019-07-11 ENCOUNTER — OFFICE VISIT (OUTPATIENT)
Dept: SURGERY | Facility: CLINIC | Age: 34
End: 2019-07-11
Payer: COMMERCIAL

## 2019-07-11 VITALS
RESPIRATION RATE: 14 BRPM | TEMPERATURE: 98.6 F | BODY MASS INDEX: 37.03 KG/M2 | SYSTOLIC BLOOD PRESSURE: 110 MMHG | HEIGHT: 63 IN | HEART RATE: 74 BPM | WEIGHT: 209 LBS | DIASTOLIC BLOOD PRESSURE: 80 MMHG

## 2019-07-11 DIAGNOSIS — K42.9 UMBILICAL HERNIA WITHOUT OBSTRUCTION AND WITHOUT GANGRENE: Primary | ICD-10-CM

## 2019-07-11 PROCEDURE — 99213 OFFICE O/P EST LOW 20 MIN: CPT | Performed by: SURGERY

## 2019-07-11 NOTE — PROGRESS NOTES
Pt states no pain just a bloating feeling  Bowels vary between normal and strained  Eating ok with no nausea

## 2019-07-11 NOTE — PROGRESS NOTES
Follow Up - General Surgery   Deja Dubois 27 y o  female MRN: [id number]  Unit/Bed#:  Encounter: [id number]    Assessment/Plan     Assessment:  Asymptomatic umbilical hernia  Morbid obesity  Plan:  Patient has remained asymptomatic from the umbilical hernia, does not change her lifestyle, she does have occasional nausea without vomiting, she is also planning to get pregnant in the near future  No further workup or intervention is needed at this time  The patient will contact us when she becomes symptomatic  History of Present Illness     HPI:  Deja Dubois is a 27 y o  female who presents to my office for evaluation of umbilical hernia  The patient noted some bulge from the umbilicus after pregnancy, she noticed bulge and pannus hanging over, she also complains of back pain  She denies any vomiting, diarrhea, constipation or urinary symptoms  She had a CT scan of the abdomen and pelvis in the emergency room in February of this year revealing small hiatal hernia and a small fat containing umbilical hernia  Review of Systems   Constitutional: Negative for chills and fever  HENT: Negative for nosebleeds and sore throat  Eyes: Negative for pain and discharge  Respiratory: Negative for cough and shortness of breath  Cardiovascular: Negative for chest pain and palpitations  Gastrointestinal:        As per history of present illness  Historical Information   No past medical history on file  No past surgical history on file  Social History   Social History     Substance and Sexual Activity   Alcohol Use No     Social History     Substance and Sexual Activity   Drug Use No     Social History     Tobacco Use   Smoking Status Never Smoker   Smokeless Tobacco Never Used     Family History: non-contributory    Meds/Allergies   all medications and allergies reviewed   No current outpatient medications on file    No Known Allergies    Objective     Current Vitals:   Blood Pressure: 110/80 (07/11/19 1303)  Pulse: 74 (07/11/19 1303)  Temperature: 98 6 °F (37 °C) (07/11/19 1303)  Temp Source: Tympanic (07/11/19 1303)  Respirations: 14 (07/11/19 1303)  Height: 5' 3" (160 cm) (07/11/19 1303)  Weight - Scale: 94 8 kg (209 lb) (07/11/19 1303)      Invasive Devices     None                 Physical Exam   Constitutional: She is oriented to person, place, and time  She appears well-developed and well-nourished  No distress  Morbidly obese  HENT:   Head: Normocephalic  Mouth/Throat: No oropharyngeal exudate  Eyes: Pupils are equal, round, and reactive to light  No scleral icterus  Neck: Normal range of motion  Cardiovascular: Normal rate and regular rhythm  No murmur heard  Pulmonary/Chest: Effort normal and breath sounds normal  No respiratory distress  Abdominal:   Abdomen is obese, soft, nondistended and nontender  There is no obvious bulge from the umbilicus, mildly tender to palpation  There is no skin color changes of the umbilical skin  There is no evidence of visceromegaly or mass palpable  Musculoskeletal: She exhibits no edema or deformity  Lymphadenopathy:     She has no cervical adenopathy  Neurological: She is alert and oriented to person, place, and time  Skin: No rash noted  No erythema  Psychiatric: She has a normal mood and affect  Her behavior is normal    Nursing note and vitals reviewed  Imaging: I have personally reviewed pertinent reports  No results found  EKG, Pathology, and Other Studies: I have personally reviewed pertinent films in PACS   CT ABDOMEN AND PELVIS WITHOUT IV CONTRAST - LOW DOSE RENAL STONE      INDICATION:   left flank pain  Left flank pain for 3 days      COMPARISON:  None      TECHNIQUE:  Low dose thin section CT examination of the abdomen and pelvis was performed without intravenous or oral contrast according to a protocol specifically designed to evaluate for urinary tract calculus    Axial, sagittal, and coronal 2D reformatted images were created from the source data and submitted for interpretation  Evaluation for pathology in the abdomen and pelvis that is unrelated to urinary tract calculi is limited       Radiation dose length product (DLP) for this visit:  579 mGy-cm   This examination, like all CT scans performed in the [place], was performed utilizing techniques to minimize radiation dose exposure, including the use of iterative   reconstruction and automated exposure control       FINDINGS:     RIGHT KIDNEY AND URETER:  No urinary tract calculi  No hydronephrosis or hydroureter      LEFT KIDNEY AND URETER:  No urinary tract calculi  No hydronephrosis or hydroureter  7 mm fat-containing mass in the lower pole, most compatible with angiomyolipoma (series 2, image 72)      URINARY BLADDER:   Incompletely distended  Inadequately evaluated      Atelectasis in the lower lobes bilaterally  Scarring in the right middle lobe      Limited low radiation dose noncontrast CT evaluation demonstrates no clinically significant abnormality of spleen, pancreas, or adrenal glands      The liver is enlarged with fatty infiltrative changes      No calcified gallstones or gallbladder wall thickening noted      No ascites or bulky lymphadenopathy on this limited noncontrast study         Evaluation of the GI tract is limited due to lack of oral contrast material      Stomach decompressed  Hiatal hernia      No evidence of small bowel obstruction      Normal fecal burden throughout the colon      Limited evaluation demonstrates no evidence to suggest acute appendicitis      Tampon in the vagina      Umbilical hernia containing fat      No acute fracture or destructive osseous lesion is identified         IMPRESSION:  No obstructing renal calculi

## 2019-09-09 ENCOUNTER — TELEPHONE (OUTPATIENT)
Dept: OBGYN CLINIC | Facility: CLINIC | Age: 34
End: 2019-09-09

## 2019-09-09 NOTE — TELEPHONE ENCOUNTER
I checked with jesus prior to placing encounter due to new ob pt but not new to [place]  Pt is  lmp on 19 which puts her at 4w1d, Friday pt had brown blood when wiping and nothing on Saturday, then  when wiping saw pink and today is back to brown when wiping  Pt would like to know if cause for concern  I tried to reassure pt in advising sounds like old blood  Please advise  Pt scheduled  with de Gomez texted on call ED

## 2019-09-09 NOTE — TELEPHONE ENCOUNTER
patient contacted and advised as directed in tiger text  No worries about  bleeding  Please verify blood type is not Rh negative  Pt stated she cant remember if she was RH negative she will call prior provider and confirm blood type

## 2019-09-11 ENCOUNTER — OFFICE VISIT (OUTPATIENT)
Dept: DERMATOLOGY | Facility: CLINIC | Age: 34
End: 2019-09-11
Payer: COMMERCIAL

## 2019-09-11 ENCOUNTER — TELEPHONE (OUTPATIENT)
Dept: OBGYN CLINIC | Facility: CLINIC | Age: 34
End: 2019-09-11

## 2019-09-11 DIAGNOSIS — R21 RASH: ICD-10-CM

## 2019-09-11 DIAGNOSIS — Z13.89 SCREENING FOR SKIN CONDITION: ICD-10-CM

## 2019-09-11 DIAGNOSIS — D22.9 NEVUS: ICD-10-CM

## 2019-09-11 DIAGNOSIS — D23.9 DERMATOFIBROMA: Primary | ICD-10-CM

## 2019-09-11 PROCEDURE — 99204 OFFICE O/P NEW MOD 45 MIN: CPT | Performed by: DERMATOLOGY

## 2019-09-11 NOTE — TELEPHONE ENCOUNTER
Pt is  lmp on 19, pt is calling due has a cold and wants to know if she can take sudafed  Per protocol I adivised pt she can not take Sudafed  Pt stated she is highly congested  I advied she can try saline nasal spray to help with congestion but Do Not use the "d" decongestant  She can try robitussin- plain  Pt grateful and agreed no decongestants

## 2019-09-11 NOTE — PROGRESS NOTES
[id number] [id number]  [phone]  [phone]     MRN: [id number] : 1985  Encounter: [id number]  Patient Information: Alessandra Robin  Chief complaint: skin check up    History of present illness:  69-year-old female who had not seen for 15 years presents for concerns regarding a rash on her left ankle also concerned regarding numerous moles patient reports having some moles removed previously  Family history of personal history of melanoma noted patient did use a tanning bed while in college  Patient notes that when I saw her previously that she had a rash on the dorsum of the foot we biopsied gave her a topical cream in seem to resolved biopsy is not available as yet will probably able to look at that biopsy tomorrow  In addition patient notes no symptomatology for this process  Her  feels it is getting larger  Patient notes numerous moles no significant noted different changes noted  History reviewed  No pertinent past medical history  History reviewed  No pertinent surgical history  Social History   Social History     Substance and Sexual Activity   Alcohol Use No     Social History     Substance and Sexual Activity   Drug Use No     Social History     Tobacco Use   Smoking Status Never Smoker   Smokeless Tobacco Never Used     Family History   Problem Relation Age of Onset    Asthma Mother     Hypertension Father     Hyperlipidemia Father      Meds/Allergies   No Known Allergies    Meds:  Prior to Admission medications    Medication Sig Start Date End Date Taking?  Authorizing Provider   Prenatal Multivit-Min-Fe-FA (PRENATAL VITAMINS PO) Take by mouth   Yes Historical Provider, MD       Subjective:     Review of Systems:    General: negative for - chills, fatigue, fever,  weight gain or weight loss  Psychological: negative for - anxiety, behavioral disorder, concentration difficulties, decreased libido, depression, irritability, memory difficulties, mood swings, sleep disturbances or suicidal ideation  ENT: negative for - hearing difficulties , nasal congestion, nasal discharge, oral lesions, sinus pain, sneezing, sore throat  Allergy and Immunology: negative for - hives, insect bite sensitivity,  Hematological and Lymphatic: negative for - bleeding problems, blood clots,bruising, swollen lymph nodes  Endocrine: negative for - hair pattern changes, hot flashes, malaise/lethargy, mood swings, palpitations, polydipsia/polyuria, skin changes, temperature intolerance or unexpected weight change  Respiratory: negative for - cough, hemoptysis, orthopnea, shortness of breath, or wheezing  Cardiovascular: negative for - chest pain, dyspnea on exertion, edema,  Gastrointestinal: negative for - abdominal pain, nausea/vomiting  Genito-Urinary: negative for - dysuria, incontinence, irregular/heavy menses or urinary frequency/urgency  Musculoskeletal: negative for - gait disturbance, joint pain, joint stiffness, joint swelling, muscle pain, muscular weakness  Dermatological:  As in HPI  Neurological: negative for confusion, dizziness, headaches, impaired coordination/balance, memory loss, numbness/tingling, seizures, speech problems, tremors or weakness       Objective: There were no vitals taken for this visit      Physical Exam:    General Appearance:    Alert, cooperative, no distress   Head:    Normocephalic, without obvious abnormality, atraumatic           Skin:   A full skin exam was performed including scalp, head scalp, eyes, ears, nose, lips, neck, chest, axilla, abdomen, back, buttocks, bilateral upper extremities, bilateral lower extremities, hands, feet, fingers, toes, fingernails, and toenails a numerous pigmented lesions noted on widespread areas of her body all pretty much less than 4 mm size nothing markedly atypical there is a dome-shaped papule noted on the left forearm with positive pinch sign on the left lateral malleolus of the ankle areas of scaling  hyperpigmented area noted appears not raised KOH was performed and negative nothing else of concern noted     Assessment:     1  Dermatofibroma     2  Nevus     3  Rash     4  Screening for skin condition           Plan:   Dermatofibroma left forearm patient reassured that the these are normal growths that occur from a in injury or insect bite consideration can be given for excision  Nevi reviewed the concept of ABCDE and ugly duckling nothing markedly atypical patient reassured  Since patient appears to have more than 100 moles and advised her that she has an increased risk for melanoma definitely should be more careful with sun and sun exposure and would recommend yearly checkup  Rash left ankle looks reminiscent for possible granuloma annulare or also gyrate type erythema will see if the biopsy shows this anything specific 15 years ago  Will go ahead treat with a topical steroid to see if this will resolve  Screening for Dermatologic Disorders: Nothing else of concern noted on complete exam follow up in 1 year     Jillene Siemens, MD  9/11/2019,3:59 PM    Portions of the record may have been created with voice recognition software   Occasional wrong word or "sound a like" substitutions may have occurred due to the inherent limitations of voice recognition software   Read the chart carefully and recognize, using context, where substitutions have occurred

## 2019-09-11 NOTE — PATIENT INSTRUCTIONS
Dermatofibroma left forearm patient reassured that the these are normal growths that occur from a in injury or insect bite consideration can be given for excision  Nevi reviewed the concept of ABCDE and ugly duckling nothing markedly atypical patient reassured  Since patient appears to have more than 100 moles and advised her that she has an increased risk for melanoma definitely should be more careful with sun and sun exposure and would recommend yearly checkup  Rash left ankle looks reminiscent for possible granuloma annulare or also gyrate type erythema will see if the biopsy shows this anything specific 15 years ago    Will go ahead treat with a topical steroid to see if this will resolve  Screening for Dermatologic Disorders: Nothing else of concern noted on complete exam follow up in 1 year

## 2019-09-13 ENCOUNTER — OFFICE VISIT (OUTPATIENT)
Dept: OBGYN CLINIC | Facility: CLINIC | Age: 34
End: 2019-09-13
Payer: COMMERCIAL

## 2019-09-13 DIAGNOSIS — N91.2 AMENORRHEA: Primary | ICD-10-CM

## 2019-09-13 PROCEDURE — 99202 OFFICE O/P NEW SF 15 MIN: CPT | Performed by: NURSE PRACTITIONER

## 2019-09-13 PROCEDURE — 76817 TRANSVAGINAL US OBSTETRIC: CPT | Performed by: NURSE PRACTITIONER

## 2019-09-13 NOTE — PATIENT INSTRUCTIONS
First Trimester Pregnancy   WHAT YOU NEED TO KNOW:   The first trimester of pregnancy lasts from your last period through the 13th week of pregnancy  Follow up with your healthcare provider for prenatal care  Regular prenatal care can help to keep you and your baby healthy  DISCHARGE INSTRUCTIONS:   Return to the emergency department if:   · You have pain or cramping in your abdomen or low back  · You have severe vaginal bleeding or clotting  Contact your healthcare provider or obstetrician if:   · You have light bleeding  · You have chills or a fever  · You have vaginal itching, burning, or pain  · You have yellow, green, white, or foul-smelling vaginal discharge  · You have pain or burning when you urinate, less urine than usual, or pink or bloody urine  · You have questions or concerns about your condition or care  Follow up with your healthcare provider or obstetrician as directed:  Go to all of your prenatal visits during your pregnancy  Write down your questions so you remember to ask them during your visits  Stay healthy during pregnancy:   · Take prenatal vitamins as directed  Prenatal vitamins can help you get the amount of vitamins and minerals you need during pregnancy  Prenatal vitamins may also decrease the risk of certain birth defects  · Eat a variety of healthy foods  Healthy foods include fruits, vegetables, whole-grain breads, low-fat dairy products, beans, turkey and chicken, and lean red meat  Ask your healthcare provider for more information about foods that are healthy and safe to eat during pregnancy  · Drink liquids as directed  Ask how much liquid to drink each day and which liquids are best for you  Some healthy liquids include milk, water, and juice  It is not clear how caffeine affects pregnancy  Limit your intake of caffeine to less than 200 mg each day to avoid possible health problems   Caffeine may be found in coffee, tea, cola, sports drinks, and chocolate  Do not drink alcohol  · Talk to your healthcare provider before you take medicines  Many medicines can harm your baby, especially during early pregnancy  Ask your healthcare provider before you take any medicines, including over-the-counter medicines, vitamins, herbs, or food supplements  Never use illegal or street drugs while you are pregnant  Talk to your healthcare provider if you are having trouble quitting street drugs  · Exercise  Ask your healthcare provider about the best exercise plan for you  Exercise may help you feel better and make your labor and delivery easier  · Do not smoke  Smoking can cause problems during pregnancy  It can also cause your baby to weigh less at birth  If you smoke, it is never too late to quit  Ask for information if you need help quitting  Safety tips:   · Do not use a hot tub or sauna  while you are pregnant, especially during your first trimester  Hot tubs and saunas may raise your baby's temperature and increase the risk of birth defects  It also increases your risk of miscarriage  · Protect yourself from illness  Toxoplasmosis is a disease that can cause birth defects  To protect yourself from this disease, do not clean your cat's litter box yourself  Ask someone else to clean your cat's litter box while you are pregnant  Also, do not  eat raw meat or unwashed fruits and vegetables  Wash your hands after you touch raw meat, and eat only well-cooked meat  Wash fruits and vegetables well before you eat them  © 2017 [address] [employer] Information is for End User's use only and may not be sold, redistributed or otherwise used for commercial purposes  All illustrations and images included in CareNotes® are the copyrighted property of [employer] M [employer]  or [employer]  The above information is an  only  It is not intended as medical advice for individual conditions or treatments   Talk to your doctor, nurse or pharmacist before following any medical regimen to see if it is safe and effective for you

## 2019-09-13 NOTE — PROGRESS NOTES
Technician: Study performed by the interpreting Certified Nurse Practitioner    Indications:  amenorrhea           LMP 19            Procedure Details  A gestational sac is seen containing a yolk sac and a escudero embryo  The embryonic crown-rump length measures 1 59 cm  and calculates to an estimated gestational age of 11 weeks and 1 Days   (LMP dates 9w 1d)  Embryonic cardiac activity is present @ 172 BPM     Findings:   Viable, escudero intrauterine pregnancy at 8weeks, 1 day, (There is a change to [facility])  with a calculated EDC of 20

## 2019-09-26 ENCOUNTER — INITIAL PRENATAL (OUTPATIENT)
Dept: OBGYN CLINIC | Facility: CLINIC | Age: 34
End: 2019-09-26

## 2019-09-26 VITALS
HEIGHT: 63 IN | DIASTOLIC BLOOD PRESSURE: 78 MMHG | WEIGHT: 203 LBS | HEART RATE: 82 BPM | SYSTOLIC BLOOD PRESSURE: 130 MMHG | BODY MASS INDEX: 35.97 KG/M2

## 2019-09-26 DIAGNOSIS — Z34.81 PRENATAL CARE, SUBSEQUENT PREGNANCY, FIRST TRIMESTER: Primary | ICD-10-CM

## 2019-09-26 PROCEDURE — OBC: Performed by: STUDENT IN AN ORGANIZED HEALTH CARE EDUCATION/TRAINING PROGRAM

## 2019-09-26 NOTE — PATIENT INSTRUCTIONS
Pregnancy   AMBULATORY CARE:   What you need to know about pregnancy:  A normal pregnancy lasts about 40 weeks  The first trimester lasts from your last period through the 12th week of pregnancy  The second trimester lasts from the 13th week of your pregnancy through the 23rd week  The third trimester lasts from your 24th week of pregnancy until your baby is born  If you know the date of your last period, your healthcare provider can estimate your due date  You may give birth to your baby any time from 37 weeks to 2 weeks after your due date  Seek care immediately if:   · You develop a severe headache that does not go away  · You have new or increased vision changes, such as blurred or spotted vision  · You have new or increased swelling in your face or hands  · You have pain or cramping in your abdomen or low back  · You have vaginal bleeding  Contact your healthcare provider or obstetrician if:   · You have abdominal cramps, pressure, or tightening  · You have a change in vaginal discharge  · You cannot keep food or drinks down, and you are losing weight  · You have chills or a fever  · You have vaginal itching, burning, or pain  · You have yellow, green, white, or foul-smelling vaginal discharge  · You have pain or burning when you urinate, less urine than usual, or pink or bloody urine  · You have questions or concerns about your condition or care  Body changes that may occur during your pregnancy:   · Breast changes  you will experience include tenderness and tingling during the early part of your pregnancy  Your breasts will become larger  You may need to use a support bra  You may see a thin, yellow fluid, called colostrum, leak from your nipples during the second trimester  Colostrum is a liquid that changes to milk about 3 days after you give birth  · Skin changes and stretch marks  may occur during your pregnancy   You may have red marks, called stretch marks, on your skin  Stretch marks will usually fade after pregnancy  Use lotion if your skin is dry and itchy  The skin on your face, around your nipples, and below your belly button may darken  Most of the time, your skin will return to its normal color after your baby is born  · Morning sickness  is nausea and vomiting that can happen at any time of day  Avoid fatty and spicy foods  Eat small meals throughout the day instead of large meals  Geovanna may help to decrease nausea  Ask your healthcare provider about other ways of decreasing nausea and vomiting  · Heartburn  may be caused by changes in your hormones during pregnancy  Your growing uterus may also push your stomach upward and force stomach acid to back up into your esophagus  Eat 4 or 5 small meals each day instead of large meals  Avoid spicy foods  Avoid eating right before bedtime  · Constipation  may develop during your pregnancy  To treat constipation, eat foods high in fiber such as fiber cereals, beans, fruits, vegetables, whole-grain breads, and prune juice  Get regular exercise and drink plenty of water  Your healthcare provider may also suggest a fiber supplement to soften your bowel movements  Talk to your healthcare provider before you use any medicines to decrease constipation  · Hemorrhoids  are enlarged veins in the rectal area  They may cause pain, itching, and bright red bleeding from your rectum  To decrease your risk of hemorrhoids, prevent constipation and do not strain to have a bowel movement  If you have hemorrhoids, soak in a tub of warm water to ease discomfort  Ask your healthcare provider how you can treat hemorrhoids  · Leg cramps and swelling  may be caused by low calcium levels or the added weight of pregnancy  Raise your legs above the level of your heart to decrease swelling  During a leg cramp, stretch or massage the muscle that has the cramp  Heat may help decrease pain and muscle spasms   Apply heat on your muscle for 20 to 30 minutes every 2 hours for as many days as directed  · Back pain  may occur as your baby grows  Do not stand for long periods of time or lift heavy items  Use good posture while you stand, squat, or bend  Wear low-heeled shoes with good support  Rest may also help to relieve back pain  Ask your healthcare provider about exercises you can do to strengthen your back muscles  Stay healthy during your pregnancy:   · Eat a variety of healthy foods  Healthy foods include fruits, vegetables, whole-grain breads, low-fat dairy foods, beans, lean meats, and fish  Drink liquids as directed  Ask how much liquid to drink each day and which liquids are best for you  Limit caffeine to less than 200 milligrams each day  Limit your intake of fish to 2 servings each week  Choose fish low in mercury such as canned light tuna, shrimp, crab, salmon, cod, or tilapia  Do not  eat fish high in mercury such as swordfish, tilefish, aviva mackerel, and shark  · Take prenatal vitamins as directed  Your need for certain vitamins and minerals, such as folic acid, increases during pregnancy  Prenatal vitamins provide some of the extra vitamins and minerals you need  Prenatal vitamins may also help to decrease the risk of certain birth defects  · Ask how much weight you should gain during your pregnancy  Too much or too little weight gain can be unhealthy for you and your baby  · Talk to your healthcare provider about exercise  Moderate exercise can help you stay fit  Your healthcare provider will help you plan an exercise program that is safe for you during pregnancy  · Do not smoke  If you smoke, it is never too late to quit  Smoking increases your risk of a miscarriage and other health problems during your pregnancy  Smoking can cause your baby to be born too early or weigh less at birth  Ask your healthcare provider for information if you need help quitting  · Do not drink alcohol    Alcohol passes from your body to your baby through the placenta  It can affect your baby's brain development and cause fetal alcohol syndrome (FAS)  FAS is a group of conditions that causes mental, behavior, and growth problems  · Talk to your healthcare provider before you take any medicines  Many medicines may harm your baby if you take them when you are pregnant  Do not take any medicines, vitamins, herbs, or supplements without first talking to your healthcare provider  Never use illegal or street drugs (such as marijuana or cocaine) while you are pregnant  Safety tips:   · Avoid hot tubs and saunas  Do not use a hot tub or sauna while you are pregnant, especially during your first trimester  Hot tubs and saunas may raise your baby's temperature and increase the risk of birth defects  · Avoid toxoplasmosis  This is an infection caused by eating raw meat or being around infected cat feces  It can cause birth defects, miscarriages, and other problems  Wash your hands after you touch raw meat  Make sure any meat is well-cooked before you eat it  Avoid raw eggs and unpasteurized milk  Use gloves or ask someone else to clean your cat's litter box while you are pregnant  · Ask your healthcare provider about travel  The most comfortable time to travel is during the second trimester  Ask your healthcare provider if you can travel after 36 weeks  You may not be able to travel in an airplane after 36 weeks  He may also recommend that you avoid long road trips  Follow up with your healthcare provider or obstetrician as directed:  Go to all of your prenatal visits during your pregnancy  Write down your questions so you remember to ask them during your visits  © 2017 [employer]  Information is for End User's use only and may not be sold, redistributed or otherwise used for commercial purposes   All illustrations and images included in CareNotes® are the copyrighted property of A D A M [employer] Inc  or [facility] [employer]  The above information is an  only  It is not intended as medical advice for individual conditions or treatments  Talk to your doctor, nurse or pharmacist before following any medical regimen to see if it is safe and effective for you

## 2019-09-26 NOTE — PROGRESS NOTES
OB INTAKE INTERVIEW  * Pt presents for OB intake 10w0d  * Accompanied by:  Milla Robins  *[id number]  *Hx of  delivery prior to 36 weeks 6 days no  *Last Menstrual Period: Pt's LMP was 19  *Ultrasound date:19   8weeks 1days  *Estimated date of delivery: 20   * confirmed by US    *Signs/Symptoms of Pregnancy   *nausea/vomitting in evenings, breast tenderness, fatigue   *constipation no   *headaches no   *cramping/spotting no   *PICA cravings no  *Diabetes- if you answer yes, please order 1 hour GTT, 50g   *hx of GDM no   *BMI >35 YES   *first degree relative with type 2 diabetes no   *hx of PCOS no   *current metformin use no   *prior hx of LGA/macrosomia no   *AMA with other risk factors yes  *Hypertension- if you answer yes, please order preeclampsia labs including 24 hour urine protein   *Hx of chronic HTN no   *hx of gestational HTN no   *hx of preeclampsia, eclampsia, or HELLP syndrome no  *Infection Screening-    *does the pt have a hx of MRSA? no   *if yes- please follow MRSA protocol and obtain a nasal swab for MRSA culture   *history of herpes? no  *Immunizations:   *influenza vaccine given today no-declines   *discussed Tdap vaccine-yes    *Interview education   *[facility] Pregnancy Essentials Book reviewed and discussed   *Handouts given:    *Baby and Me phone wilfredo guide    *Baby and Me support center    *[facility]     *discussed genetic testing- pt interested YES     *appointment at [facility] made YES    *Prenatal lab work scripts, added 1 hr GTT advised to complete by 10/12/19   *Nurse/Family Partnership- pt may qualify no; referral placed no  *Substance Abuse Screening 4Ps   Presently-NO   Past-was drinking alcohol brice prior to confirmed pregnancy 1-2 drinks each night  Partner-NO   Parents/Family-NO     *I have these concerns about this prenatal patient: unplanned but welcomed pregnancy   They had tried for about 3-4 years to get pregnant with no success so they stopped actively trying about 6-9 months ago-never saw infertility  Healthy son delivered at [address] [facility] by Dr cornelius-states it was a difficult delivery 24+ hours due to a tilted uterus  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

## 2019-09-30 ENCOUNTER — TELEPHONE (OUTPATIENT)
Dept: OBGYN CLINIC | Facility: CLINIC | Age: 34
End: 2019-09-30

## 2019-09-30 ENCOUNTER — APPOINTMENT (OUTPATIENT)
Dept: LAB | Facility: HOSPITAL | Age: 34
End: 2019-09-30
Attending: STUDENT IN AN ORGANIZED HEALTH CARE EDUCATION/TRAINING PROGRAM
Payer: COMMERCIAL

## 2019-09-30 DIAGNOSIS — Z3A.10 10 WEEKS GESTATION OF PREGNANCY: ICD-10-CM

## 2019-09-30 DIAGNOSIS — Z3A.10 10 WEEKS GESTATION OF PREGNANCY: Primary | ICD-10-CM

## 2019-09-30 DIAGNOSIS — Z34.81 PRENATAL CARE, SUBSEQUENT PREGNANCY, FIRST TRIMESTER: ICD-10-CM

## 2019-09-30 LAB
ABO GROUP BLD: NORMAL
BACTERIA UR QL AUTO: ABNORMAL /HPF
BASOPHILS # BLD AUTO: 0.03 THOUSANDS/ΜL (ref 0–0.1)
BASOPHILS NFR BLD AUTO: 0 % (ref 0–1)
BILIRUB UR QL STRIP: NEGATIVE
BLD GP AB SCN SERPL QL: NEGATIVE
CLARITY UR: CLEAR
COLOR UR: YELLOW
EOSINOPHIL # BLD AUTO: 0.17 THOUSAND/ΜL (ref 0–0.61)
EOSINOPHIL NFR BLD AUTO: 2 % (ref 0–6)
ERYTHROCYTE [DISTWIDTH] IN BLOOD BY AUTOMATED COUNT: 12.9 % (ref 11.6–15.1)
GLUCOSE 1H P 50 G GLC PO SERPL-MCNC: 133 MG/DL
GLUCOSE UR STRIP-MCNC: NEGATIVE MG/DL
HCT VFR BLD AUTO: 40 % (ref 34.8–46.1)
HGB BLD-MCNC: 12.9 G/DL (ref 11.5–15.4)
HGB UR QL STRIP.AUTO: NEGATIVE
IMM GRANULOCYTES # BLD AUTO: 0.04 THOUSAND/UL (ref 0–0.2)
IMM GRANULOCYTES NFR BLD AUTO: 0 % (ref 0–2)
KETONES UR STRIP-MCNC: NEGATIVE MG/DL
LEUKOCYTE ESTERASE UR QL STRIP: NEGATIVE
LYMPHOCYTES # BLD AUTO: 1.67 THOUSANDS/ΜL (ref 0.6–4.47)
LYMPHOCYTES NFR BLD AUTO: 18 % (ref 14–44)
MCH RBC QN AUTO: 29.5 PG (ref 26.8–34.3)
MCHC RBC AUTO-ENTMCNC: 32.3 G/DL (ref 31.4–37.4)
MCV RBC AUTO: 92 FL (ref 82–98)
MONOCYTES # BLD AUTO: 0.45 THOUSAND/ΜL (ref 0.17–1.22)
MONOCYTES NFR BLD AUTO: 5 % (ref 4–12)
NEUTROPHILS # BLD AUTO: 6.86 THOUSANDS/ΜL (ref 1.85–7.62)
NEUTS SEG NFR BLD AUTO: 75 % (ref 43–75)
NITRITE UR QL STRIP: NEGATIVE
NON-SQ EPI CELLS URNS QL MICRO: ABNORMAL /HPF
NRBC BLD AUTO-RTO: 0 /100 WBCS
PH UR STRIP.AUTO: 5.5 [PH]
PLATELET # BLD AUTO: 277 THOUSANDS/UL (ref 149–390)
PMV BLD AUTO: 10.4 FL (ref 8.9–12.7)
PROT UR STRIP-MCNC: NEGATIVE MG/DL
RBC # BLD AUTO: 4.37 MILLION/UL (ref 3.81–5.12)
RBC #/AREA URNS AUTO: ABNORMAL /HPF
RH BLD: POSITIVE
SP GR UR STRIP.AUTO: >=1.03 (ref 1–1.03)
SPECIMEN EXPIRATION DATE: NORMAL
UROBILINOGEN UR QL STRIP.AUTO: 0.2 E.U./DL
WBC # BLD AUTO: 9.22 THOUSAND/UL (ref 4.31–10.16)
WBC #/AREA URNS AUTO: ABNORMAL /HPF

## 2019-09-30 PROCEDURE — 81001 URINALYSIS AUTO W/SCOPE: CPT

## 2019-09-30 PROCEDURE — 82950 GLUCOSE TEST: CPT

## 2019-09-30 PROCEDURE — 80081 OBSTETRIC PANEL INC HIV TSTG: CPT

## 2019-09-30 PROCEDURE — 36415 COLL VENOUS BLD VENIPUNCTURE: CPT

## 2019-09-30 PROCEDURE — 87086 URINE CULTURE/COLONY COUNT: CPT

## 2019-10-01 LAB
BACTERIA UR CULT: NORMAL
HBV SURFACE AG SER QL: NORMAL
RPR SER QL: NORMAL
RUBV IGG SERPL IA-ACNC: 89.6 IU/ML

## 2019-10-02 LAB — HIV 1+2 AB+HIV1 P24 AG SERPL QL IA: NORMAL

## 2019-10-14 ENCOUNTER — ROUTINE PRENATAL (OUTPATIENT)
Dept: PERINATAL CARE | Facility: CLINIC | Age: 34
End: 2019-10-14
Payer: COMMERCIAL

## 2019-10-14 VITALS
WEIGHT: 201.2 LBS | HEART RATE: 75 BPM | DIASTOLIC BLOOD PRESSURE: 83 MMHG | HEIGHT: 63 IN | BODY MASS INDEX: 35.65 KG/M2 | SYSTOLIC BLOOD PRESSURE: 138 MMHG

## 2019-10-14 DIAGNOSIS — O09.521 MULTIGRAVIDA OF ADVANCED MATERNAL AGE IN FIRST TRIMESTER: ICD-10-CM

## 2019-10-14 DIAGNOSIS — Z36.82 ENCOUNTER FOR (NT) NUCHAL TRANSLUCENCY SCAN: ICD-10-CM

## 2019-10-14 DIAGNOSIS — Z34.81 PRENATAL CARE, SUBSEQUENT PREGNANCY, FIRST TRIMESTER: ICD-10-CM

## 2019-10-14 DIAGNOSIS — D25.9 UTERINE FIBROID IN PREGNANCY: ICD-10-CM

## 2019-10-14 DIAGNOSIS — O34.10 UTERINE FIBROID IN PREGNANCY: ICD-10-CM

## 2019-10-14 DIAGNOSIS — Z3A.12 12 WEEKS GESTATION OF PREGNANCY: Primary | ICD-10-CM

## 2019-10-14 PROBLEM — Z01.419 ENCOUNTER FOR GYNECOLOGICAL EXAMINATION (GENERAL) (ROUTINE) WITHOUT ABNORMAL FINDINGS: Status: RESOLVED | Noted: 2018-10-08 | Resolved: 2019-10-14

## 2019-10-14 PROBLEM — R05.9 COUGH: Status: RESOLVED | Noted: 2018-02-05 | Resolved: 2019-10-14

## 2019-10-14 PROBLEM — J01.00 ACUTE NON-RECURRENT MAXILLARY SINUSITIS: Status: RESOLVED | Noted: 2019-04-16 | Resolved: 2019-10-14

## 2019-10-14 PROBLEM — Z20.828 EXPOSURE TO INFLUENZA: Status: RESOLVED | Noted: 2018-01-29 | Resolved: 2019-10-14

## 2019-10-14 PROBLEM — O99.210 OBESITY COMPLICATING PREGNANCY: Status: ACTIVE | Noted: 2019-10-14

## 2019-10-14 PROBLEM — N91.2 AMENORRHEA: Status: RESOLVED | Noted: 2019-09-13 | Resolved: 2019-10-14

## 2019-10-14 PROBLEM — O09.529 ADVANCED MATERNAL AGE IN MULTIGRAVIDA: Status: ACTIVE | Noted: 2019-10-14

## 2019-10-14 PROCEDURE — 76801 OB US < 14 WKS SINGLE FETUS: CPT | Performed by: OBSTETRICS & GYNECOLOGY

## 2019-10-14 PROCEDURE — 76813 OB US NUCHAL MEAS 1 GEST: CPT | Performed by: OBSTETRICS & GYNECOLOGY

## 2019-10-14 PROCEDURE — 99241 PR OFFICE CONSULTATION NEW/ESTAB PATIENT 15 MIN: CPT | Performed by: OBSTETRICS & GYNECOLOGY

## 2019-10-14 NOTE — PROGRESS NOTES
[address]: Ms Saleem Sanon was seen today at 12w4d for nuchal translucency ultrasound  See ultrasound report under "OB Procedures" tab  My recommendations are as follows:    1  We reviewed the availability of genetic screening, as well as diagnostic testing, which are available to all pregnant women  We reviewed limitations, risks, and benefits of screening and testing  She elected to proceed with NIPT, which was drawn in our office today  She does not wish to pursue diagnostic testing at this time  She does NOT wish to know fetal sex prior to delivery  2   A detailed anatomic survey as well as transvaginal cervical length screening are recommended between 18-22 weeks gestation  3   Advanced Maternal Age (AMA) is defined as maternal age 28 or greater at [facility]  Advanced maternal age is associated with an increased risk of several pregnancy outcomes, including aneuploidy/genetic syndromes, poor fetal growth, stillbirth, maternal hypertensive disorders, and gestational diabetes  Despite these increased risks, many women of advanced maternal age have normal, healthy pregnancy outcomes, particularly if they have no co-existing medical conditions  Genetic counseling is available to further discuss genetic screening and testing options available in pregnancy  Risk of adverse outcomes is proportional to patient age  For women age 36 and older, we recommend serial ultrasounds for fetal growth (at 29 and 34 weeks), as well as initiating antepartum fetal surveillance weekly at 36 weeks gestation  4  Obesity in pregnancy (defined as body mass index > 30 kg/m2) is associated with an increased risk of several pregnancy complications, including hypertensive disorders, diabetes, abnormal fetal growth, fetal malformations  The risk of  delivery is also increased, as are wound complications in the event of  delivery   A healthy diet and exercise, as well as appropriate gestational weight gain (no more than 11-20 pounds) can help reduce risk of these complications  150 minutes of moderate exercise per week is recommended for all pregnant women  Nutrition counseling is also available if desired  Early screening for gestational diabetes is recommended (she has already completed and it was normal), as well as routine re-screening at 24-28 weeks if early screening results are normal   fetal surveillance is also recommended as follows:BMI 30-40:  Evaluation of fetal growth at 32 weeks gestation  BMI >40: Evaluation of fetal growth at 28, 34 weeks gestation, as well as antepartum fetal surveillance once weekly beginning at 36 weeks gestation  5  We reviewed the finding of a single fibroid seen today by ultrasound  Uterine fibroids are smooth muscle tumors of the uterus, and are generally benign and do not usually significantly influence pregnancy  Pregnancy complications of fibroids include risk of degeneration (and maternal pain), increased risk of postpartum hemorrhage, and for large fibroids, particularly those 5cm and greater, risk of fetal malpresentation and fetal growth restriction  For fibroids greater than or equal to 5cm, re-assessment of fetal growth is reasonable in the third trimester  We will continue to monitor the size of the fibroid and fetal growth and position throughout her her pregnancy        Please don't hesitate to contact our office with any concerns or questions     -Elvia Morales MD

## 2019-10-14 NOTE — LETTER
October 14, 2019     [address]  [address] [address]    Patient: Sarbjit Whittington   YOB: 1985   Date of Visit: 10/14/2019       Dear Dr aMra Anglin: Thank you for referring Goran Dailey to me for evaluation  Below are my notes for this consultation  If you have questions, please do not hesitate to call me  I look forward to following your patient along with you  Sincerely,        Cecilia No MD        CC: No Recipients  Cecilia No MD  10/14/2019  4:19 PM  Sign at close encounter  [address]: Ms Ashok Hooker was seen today at 12w4d for nuchal translucency ultrasound  See ultrasound report under "OB Procedures" tab  My recommendations are as follows:    1  We reviewed the availability of genetic screening, as well as diagnostic testing, which are available to all pregnant women  We reviewed limitations, risks, and benefits of screening and testing  She elected to proceed with NIPT, which was drawn in our office today  She does not wish to pursue diagnostic testing at this time  She does NOT wish to know fetal sex prior to delivery  2   A detailed anatomic survey as well as transvaginal cervical length screening are recommended between 18-22 weeks gestation  3   Advanced Maternal Age (AMA) is defined as maternal age 28 or greater at [facility]  Advanced maternal age is associated with an increased risk of several pregnancy outcomes, including aneuploidy/genetic syndromes, poor fetal growth, stillbirth, maternal hypertensive disorders, and gestational diabetes  Despite these increased risks, many women of advanced maternal age have normal, healthy pregnancy outcomes, particularly if they have no co-existing medical conditions  Genetic counseling is available to further discuss genetic screening and testing options available in pregnancy  Risk of adverse outcomes is proportional to patient age  For women age 36 and older, we recommend serial ultrasounds for fetal growth (at 29 and 34 weeks), as well as initiating antepartum fetal surveillance weekly at 36 weeks gestation  4  Obesity in pregnancy (defined as body mass index > 30 kg/m2) is associated with an increased risk of several pregnancy complications, including hypertensive disorders, diabetes, abnormal fetal growth, fetal malformations  The risk of  delivery is also increased, as are wound complications in the event of  delivery  A healthy diet and exercise, as well as appropriate gestational weight gain (no more than 11-20 pounds) can help reduce risk of these complications  150 minutes of moderate exercise per week is recommended for all pregnant women  Nutrition counseling is also available if desired  Early screening for gestational diabetes is recommended (she has already completed and it was normal), as well as routine re-screening at 24-28 weeks if early screening results are normal   fetal surveillance is also recommended as follows:BMI 30-40:  Evaluation of fetal growth at 32 weeks gestation  BMI >40: Evaluation of fetal growth at 28, 34 weeks gestation, as well as antepartum fetal surveillance once weekly beginning at 36 weeks gestation  5  We reviewed the finding of a single fibroid seen today by ultrasound  Uterine fibroids are smooth muscle tumors of the uterus, and are generally benign and do not usually significantly influence pregnancy  Pregnancy complications of fibroids include risk of degeneration (and maternal pain), increased risk of postpartum hemorrhage, and for large fibroids, particularly those 5cm and greater, risk of fetal malpresentation and fetal growth restriction  For fibroids greater than or equal to 5cm, re-assessment of fetal growth is reasonable in the third trimester  We will continue to monitor the size of the fibroid and fetal growth and position throughout her her pregnancy  Please don't hesitate to contact our office with any concerns or questions     -Bárbara Chamberlain MD

## 2019-10-14 NOTE — PROGRESS NOTES
Patient here to get [id number] test done, she states she is a "hard stick"  No veins visible to me to draw labs on at this time, patient said she prefers to get lab drawn at a later date by a lab that she has gone to for other blood draws  [id number] kit , shipping bag and label given to patient, She verbalizes understanding and says she will go to a [facility] lab to get the test drawn  Patient offers no questions at this time

## 2019-10-16 ENCOUNTER — INITIAL PRENATAL (OUTPATIENT)
Dept: OBGYN CLINIC | Facility: CLINIC | Age: 34
End: 2019-10-16

## 2019-10-16 VITALS
BODY MASS INDEX: 35.14 KG/M2 | WEIGHT: 198.38 LBS | SYSTOLIC BLOOD PRESSURE: 130 MMHG | DIASTOLIC BLOOD PRESSURE: 78 MMHG

## 2019-10-16 DIAGNOSIS — Z11.3 SCREENING FOR STD (SEXUALLY TRANSMITTED DISEASE): ICD-10-CM

## 2019-10-16 DIAGNOSIS — Z34.91 FIRST TRIMESTER PREGNANCY: Primary | ICD-10-CM

## 2019-10-16 DIAGNOSIS — Z23 FLU VACCINE NEED: ICD-10-CM

## 2019-10-16 LAB
SL AMB  POCT GLUCOSE, UA: NEGATIVE
SL AMB POCT URINE PROTEIN: NEGATIVE

## 2019-10-16 PROCEDURE — 87491 CHLMYD TRACH DNA AMP PROBE: CPT | Performed by: STUDENT IN AN ORGANIZED HEALTH CARE EDUCATION/TRAINING PROGRAM

## 2019-10-16 PROCEDURE — PNV: Performed by: STUDENT IN AN ORGANIZED HEALTH CARE EDUCATION/TRAINING PROGRAM

## 2019-10-16 PROCEDURE — 87591 N.GONORRHOEAE DNA AMP PROB: CPT | Performed by: STUDENT IN AN ORGANIZED HEALTH CARE EDUCATION/TRAINING PROGRAM

## 2019-10-16 NOTE — PROGRESS NOTES
Pt here for 1st obv  G/c collected today  Urine dip negative for glucose and protein  Patient declines flu vaccine

## 2019-10-16 NOTE — PROGRESS NOTES
27 y o  Arely Benavidez at 15 8/7 wga by 1st trimester US inconsistent with LMP  She reports nausea/vomiting has been improving and denies and bleeding/cramping  Hepatitis B surface antigen: non reactive  RPR: non reactive  HIV: negative  Rubella: immune  Urine culture: no growth  Blood type: B positive   Antibody screen: negative  Hemoglobin: 12 9  Platelets: 584  Early 1 hr glucola: 133    Genetic screening:  NIPT completed with MFM; awaiting results    OB history:   Term  x 1    GYN history: Reports abnormal pap smear in high school treated with cryotherapy  Last pap smear in 2017 negative cytology, no HPV  No history of STDs  Past medical history: denies    Past surgical history: denies    Social history: Denies tobacco, alcohol, or illicit drug use  Family history: Denies congenital or genetic disorders in the family  Physical exam:   Fetal heart tones: 156  Pelvic exam: normal external genitalia  No abnormalities of vagina  Cervix visualized - appears closed with no concerning masses or abnormalities  On digital exam cervix is closed, no cervical motion tenderness  No adnexal masses or tenderness  A/P: 26 yo  at 15 8/7 wga, presents for initial prenatal visit, doing well  1  Welcomed to the practice  Labs reviewed and are WNL  2  Routine OB:   -Follow up GC/chlamydia  Due for repeat pap smear in   Can be completed postpartum   -Pt had visit with MFM this week  Will follow up genetic testing    -Routine OB visit in 4 weeks    -Pt encouraged to call with any questions or concerns

## 2019-10-18 LAB
C TRACH DNA SPEC QL NAA+PROBE: NEGATIVE
N GONORRHOEA DNA SPEC QL NAA+PROBE: NEGATIVE

## 2019-10-24 ENCOUNTER — TRANSCRIBE ORDERS (OUTPATIENT)
Dept: ADMINISTRATIVE | Facility: HOSPITAL | Age: 34
End: 2019-10-24

## 2019-10-24 ENCOUNTER — TELEPHONE (OUTPATIENT)
Dept: OBGYN CLINIC | Facility: CLINIC | Age: 34
End: 2019-10-24

## 2019-10-24 ENCOUNTER — APPOINTMENT (OUTPATIENT)
Dept: LAB | Facility: HOSPITAL | Age: 34
End: 2019-10-24
Payer: COMMERCIAL

## 2019-10-24 DIAGNOSIS — O99.281: Primary | ICD-10-CM

## 2019-10-24 DIAGNOSIS — E71.19: ICD-10-CM

## 2019-10-24 DIAGNOSIS — O99.281: ICD-10-CM

## 2019-10-24 DIAGNOSIS — E71.19: Primary | ICD-10-CM

## 2019-10-24 PROCEDURE — 36415 COLL VENOUS BLD VENIPUNCTURE: CPT

## 2019-10-24 NOTE — TELEPHONE ENCOUNTER
----- Message from Broderick Urrutia MD sent at 10/24/2019  1:16 PM EDT -----  Can you please let Abiola Jackson know that her GC/chamydia were negative? Thanks!

## 2019-11-04 ENCOUNTER — ROUTINE PRENATAL (OUTPATIENT)
Dept: OBGYN CLINIC | Facility: CLINIC | Age: 34
End: 2019-11-04

## 2019-11-04 VITALS — BODY MASS INDEX: 34.54 KG/M2 | SYSTOLIC BLOOD PRESSURE: 118 MMHG | DIASTOLIC BLOOD PRESSURE: 64 MMHG | WEIGHT: 195 LBS

## 2019-11-04 DIAGNOSIS — Z34.82 ENCOUNTER FOR SUPERVISION OF OTHER NORMAL PREGNANCY, SECOND TRIMESTER: Primary | ICD-10-CM

## 2019-11-04 DIAGNOSIS — O99.212 OBESITY AFFECTING PREGNANCY IN SECOND TRIMESTER: ICD-10-CM

## 2019-11-04 DIAGNOSIS — O09.522 MULTIGRAVIDA OF ADVANCED MATERNAL AGE IN SECOND TRIMESTER: ICD-10-CM

## 2019-11-04 PROBLEM — Z3A.12 12 WEEKS GESTATION OF PREGNANCY: Status: RESOLVED | Noted: 2019-10-14 | Resolved: 2019-11-04

## 2019-11-04 PROCEDURE — PNV: Performed by: NURSE PRACTITIONER

## 2019-11-04 NOTE — PATIENT INSTRUCTIONS
Pregnancy at 15 to 18 Weeks   AMBULATORY CARE:   What changes are happening to your body:  Now that you are in your second trimester, you have more energy  You may also feel hungrier than usual  You may start to experience other symptoms, such as heartburn or dizziness  You may be gaining about ½ to 1 pound a week, and your pregnancy is beginning to show  You may need to start wearing maternity clothes  Seek care immediately if:   · You have pain or cramping in your abdomen or low back  · You have heavy vaginal bleeding or clotting  · You pass material that looks like tissue or large clots  Collect the material and bring it with you  Contact your healthcare provider if:   · You cannot keep food or drinks down, and you are losing weight  · You have light bleeding  · You have chills or a fever  · You have vaginal itching, burning, or pain  · You have yellow, green, white, or foul-smelling vaginal discharge  · You have pain or burning when you urinate, less urine than usual, or pink or bloody urine  · You have questions or concerns about your condition or care  How to care for yourself at this stage of your pregnancy:   · Manage heartburn  by eating 4 or 5 small meals each day instead of large meals  Avoid spicy foods  Avoid eating right before bedtime  · Manage nausea and vomiting  Avoid fatty and spicy foods  Eat small meals throughout the day instead of large meals  Geovanna may help to decrease nausea  Ask your healthcare provider about other ways of decreasing nausea and vomiting  · Eat a variety of healthy foods  Healthy foods include fruits, vegetables, whole-grain breads, low-fat dairy foods, beans, lean meats, and fish  Drink liquids as directed  Ask how much liquid to drink each day and which liquids are best for you  Limit caffeine to less than 200 milligrams each day  Limit your intake of fish to 2 servings each week   Choose fish low in mercury such as canned light tuna, shrimp, salmon, cod, or tilapia  Do not  eat fish high in mercury such as swordfish, tilefish, aviva mackerel, and shark  · Take prenatal vitamins as directed  Your need for certain vitamins and minerals, such as folic acid, increases during pregnancy  Prenatal vitamins provide some of the extra vitamins and minerals you need  Prenatal vitamins may also help to decrease the risk of certain birth defects  · Do not smoke  If you smoke, it is never too late to quit  Smoking increases your risk of a miscarriage and other health problems during your pregnancy  Smoking can cause your baby to be born too early or weigh less at birth  Ask your healthcare provider for information if you need help quitting  · Do not drink alcohol  Alcohol passes from your body to your baby through the placenta  It can affect your baby's brain development and cause fetal alcohol syndrome (FAS)  FAS is a group of conditions that causes mental, behavior, and growth problems  · Talk to your healthcare provider before you take any medicines  Many medicines may harm your baby if you take them when you are pregnant  Do not take any medicines, vitamins, herbs, or supplements without first talking to your healthcare provider  Never use illegal or street drugs (such as marijuana or cocaine) while you are pregnant  Safety tips during pregnancy:   · Avoid hot tubs and saunas  Do not use a hot tub or sauna while you are pregnant, especially during your first trimester  Hot tubs and saunas may raise your baby's temperature and increase the risk of birth defects  · Avoid toxoplasmosis  This is an infection caused by eating raw meat or being around infected cat feces  It can cause birth defects, miscarriages, and other problems  Wash your hands after you touch raw meat  Make sure any meat is well-cooked before you eat it  Avoid raw eggs and unpasteurized milk   Use gloves or ask someone else to clean your cat's litter box while you are pregnant  Changes that are happening with your baby:  By 18 weeks, your baby may be about 6 inches long from the top of the head to the rump (baby's bottom)  Your baby may weigh about 11 ounces  You may be able to feel your baby's movement at about 18 weeks or later  The first movements may not be that noticeable  They may feel like a fluttering sensation  Your baby also makes sucking movements and can hear certain sounds  What you need to know about prenatal care:  During the first 28 weeks of your pregnancy, you will see your healthcare provider once a month  Your healthcare provider will check your blood pressure and weight  You may also need any of the following:  · A urine test  may also be done to check for sugar and protein  These can be signs of gestational diabetes or infection  · A blood test  may be done to check for anemia (low iron level)  · Fundal height check  is a measurement of your uterus to check your baby's growth  This number is usually the same as the number of weeks that you have been pregnant  · An ultrasound  may be done to check your baby's development  Your healthcare provider may be able to tell you what your baby's gender is during the ultrasound  · Your baby's heart rate  will be checked  © 2017 [address] Information is for End User's use only and may not be sold, redistributed or otherwise used for commercial purposes  All illustrations and images included in CareNotes® are the copyrighted property of [employer] , [employer]  or Henri Fuller  The above information is an  only  It is not intended as medical advice for individual conditions or treatments  Talk to your doctor, nurse or pharmacist before following any medical regimen to see if it is safe and effective for you

## 2019-11-04 NOTE — PROGRESS NOTES
Problem List Items Addressed This Visit        Other    Obesity complicating pregnancy    Advanced maternal age in multigravida      Other Visit Diagnoses     Encounter for supervision of other normal pregnancy, second trimester    -  Primary        Feels well  Denies LOF/CTX/VB  No concerns  Discussed fetal awareness  Declines flu shot  Denies any further alcohol consumption since finding out about pregnancy  L2 scheduled for 12/12  Early 1 hour was 133, advised her to lower her carbs/sweets intake  NIPT pending results

## 2019-11-08 ENCOUNTER — TELEPHONE (OUTPATIENT)
Dept: PERINATAL CARE | Facility: CLINIC | Age: 34
End: 2019-11-08

## 2019-11-08 NOTE — TELEPHONE ENCOUNTER
Telephone call to patient  Confirm date of birth  Reported cell free fetal DNA test results that were screen negative for trisomy 21, 18 and 13  Patient did not want to be informed of fetal gender  Advised patient to expect paperwork for MSAFP screening  Patient expressed understanding and has no additional questions at this time  Paperwork for MSAFP screening completed a mailed to patient

## 2019-11-19 ENCOUNTER — TRANSCRIBE ORDERS (OUTPATIENT)
Dept: ADMINISTRATIVE | Facility: HOSPITAL | Age: 34
End: 2019-11-19

## 2019-11-19 ENCOUNTER — LAB (OUTPATIENT)
Dept: LAB | Facility: HOSPITAL | Age: 34
End: 2019-11-19
Payer: COMMERCIAL

## 2019-11-19 DIAGNOSIS — Z36.9 UNSPECIFIED ANTENATAL SCREENING: Primary | ICD-10-CM

## 2019-11-19 DIAGNOSIS — Z36.9 UNSPECIFIED ANTENATAL SCREENING: ICD-10-CM

## 2019-11-19 PROCEDURE — 36415 COLL VENOUS BLD VENIPUNCTURE: CPT

## 2019-11-19 PROCEDURE — 82105 ALPHA-FETOPROTEIN SERUM: CPT

## 2019-11-21 LAB
2ND TRIMESTER 4 SCREEN SERPL-IMP: NORMAL
AFP ADJ MOM SERPL: 0.87
AFP INTERP AMN-IMP: NORMAL
AFP INTERP SERPL-IMP: NORMAL
AFP INTERP SERPL-IMP: NORMAL
AFP SERPL-MCNC: 36.5 NG/ML
AGE AT DELIVERY: 36 YR
GA METHOD: NORMAL
GA: 19 WEEKS
IDDM PATIENT QL: NO
MULTIPLE PREGNANCY: NO
NEURAL TUBE DEFECT RISK FETUS: NORMAL %

## 2019-11-26 LAB — MISCELLANEOUS LAB TEST RESULT: NORMAL

## 2019-12-12 ENCOUNTER — ROUTINE PRENATAL (OUTPATIENT)
Dept: PERINATAL CARE | Facility: OTHER | Age: 34
End: 2019-12-12
Payer: COMMERCIAL

## 2019-12-12 VITALS
HEIGHT: 63 IN | DIASTOLIC BLOOD PRESSURE: 77 MMHG | SYSTOLIC BLOOD PRESSURE: 118 MMHG | WEIGHT: 202 LBS | HEART RATE: 72 BPM | BODY MASS INDEX: 35.79 KG/M2

## 2019-12-12 DIAGNOSIS — O44.42 LOW LYING PLACENTA NOS OR WITHOUT HEMORRHAGE, SECOND TRIMESTER: ICD-10-CM

## 2019-12-12 DIAGNOSIS — Z3A.21 21 WEEKS GESTATION OF PREGNANCY: ICD-10-CM

## 2019-12-12 DIAGNOSIS — O09.522 MULTIGRAVIDA OF ADVANCED MATERNAL AGE IN SECOND TRIMESTER: Primary | ICD-10-CM

## 2019-12-12 PROCEDURE — 76811 OB US DETAILED SNGL FETUS: CPT | Performed by: OBSTETRICS & GYNECOLOGY

## 2019-12-12 PROCEDURE — 76817 TRANSVAGINAL US OBSTETRIC: CPT | Performed by: OBSTETRICS & GYNECOLOGY

## 2019-12-12 PROCEDURE — 99212 OFFICE O/P EST SF 10 MIN: CPT | Performed by: OBSTETRICS & GYNECOLOGY

## 2019-12-12 NOTE — PROGRESS NOTES
A transvaginal ultrasound was performed  Sonographer note on use of High Level Disinfection Process (Trophon) for transvaginal probe# 2 used, serial [id number]    Sanju Silva

## 2019-12-12 NOTE — PROGRESS NOTES
The patient was seen today for an ultrasound  Please see ultrasound report (located under Ob Procedures) for additional details  Thank you very much for allowing us to participate in the care of this very nice patient  Should you have any questions, please do not hesitate to contact me  Eliezer Connell MD [address] [place]  Attending Physician, Miky

## 2020-01-08 ENCOUNTER — ROUTINE PRENATAL (OUTPATIENT)
Dept: OBGYN CLINIC | Facility: CLINIC | Age: 35
End: 2020-01-08

## 2020-01-08 VITALS — SYSTOLIC BLOOD PRESSURE: 116 MMHG | BODY MASS INDEX: 35.75 KG/M2 | DIASTOLIC BLOOD PRESSURE: 80 MMHG | WEIGHT: 201.8 LBS

## 2020-01-08 DIAGNOSIS — O44.42 LOW LYING PLACENTA NOS OR WITHOUT HEMORRHAGE, SECOND TRIMESTER: ICD-10-CM

## 2020-01-08 DIAGNOSIS — Z34.82 ENCOUNTER FOR SUPERVISION OF OTHER NORMAL PREGNANCY, SECOND TRIMESTER: Primary | ICD-10-CM

## 2020-01-08 DIAGNOSIS — O09.522 MULTIGRAVIDA OF ADVANCED MATERNAL AGE IN SECOND TRIMESTER: ICD-10-CM

## 2020-01-08 DIAGNOSIS — O99.212 OBESITY AFFECTING PREGNANCY IN SECOND TRIMESTER: ICD-10-CM

## 2020-01-08 DIAGNOSIS — D25.9 UTERINE FIBROID IN PREGNANCY: ICD-10-CM

## 2020-01-08 DIAGNOSIS — B37.2 CANDIDAL SKIN INFECTION: ICD-10-CM

## 2020-01-08 DIAGNOSIS — O34.10 UTERINE FIBROID IN PREGNANCY: ICD-10-CM

## 2020-01-08 LAB
SL AMB  POCT GLUCOSE, UA: NEGATIVE
SL AMB POCT URINE PROTEIN: NEGATIVE

## 2020-01-08 PROCEDURE — PNV: Performed by: STUDENT IN AN ORGANIZED HEALTH CARE EDUCATION/TRAINING PROGRAM

## 2020-01-08 RX ORDER — NYSTATIN 100000 U/G
CREAM TOPICAL 2 TIMES DAILY
Qty: 30 G | Refills: 0 | Status: SHIPPED | OUTPATIENT
Start: 2020-01-08 | End: 2020-02-05

## 2020-01-08 NOTE — PROGRESS NOTES
27 y o   at 24w6d presents for routine prenatal visit  She denies contractions/leakage of fluid/vaginal bleeding  She feels good fetal movement  Vomits in the evening after dinner  Not taking anything for GERD - discussed tums or prilosec  Also requesting cream for rash under breasts  Problem List Items Addressed This Visit     Uterine fibroid in pregnancy    Obesity complicating pregnancy    Advanced maternal age in multigravida  NIPT low risk    Low lying placenta nos or without hemorrhage, second trimester  -repeat US at 32 wks  Discussed w/ pt that this often resolves  Pt considering LTCS due to forceps assisted delivery with last baby  Discussed that if low lying placenta resolves, recommendation would be for vaginal delivery, given extended recovery time and surgical risk w/ CS  However, would support her decision either way  Encounter for supervision of other normal pregnancy, second trimester    -  Primary  -28 wk labs slips given    -f/u in 4 wks or PRN    CBC and differential    Glucose, 1H PG    RPR    Candidal skin infection        nystatin (MYCOSTATIN) cream

## 2020-01-21 ENCOUNTER — APPOINTMENT (OUTPATIENT)
Dept: LAB | Facility: HOSPITAL | Age: 35
End: 2020-01-21
Attending: STUDENT IN AN ORGANIZED HEALTH CARE EDUCATION/TRAINING PROGRAM
Payer: COMMERCIAL

## 2020-01-21 DIAGNOSIS — Z34.82 ENCOUNTER FOR SUPERVISION OF OTHER NORMAL PREGNANCY, SECOND TRIMESTER: ICD-10-CM

## 2020-01-21 LAB
BASOPHILS # BLD AUTO: 0.04 THOUSANDS/ΜL (ref 0–0.1)
BASOPHILS NFR BLD AUTO: 0 % (ref 0–1)
EOSINOPHIL # BLD AUTO: 0.13 THOUSAND/ΜL (ref 0–0.61)
EOSINOPHIL NFR BLD AUTO: 1 % (ref 0–6)
ERYTHROCYTE [DISTWIDTH] IN BLOOD BY AUTOMATED COUNT: 13.7 % (ref 11.6–15.1)
GLUCOSE 1H P 50 G GLC PO SERPL-MCNC: 111 MG/DL
HCT VFR BLD AUTO: 37.7 % (ref 34.8–46.1)
HGB BLD-MCNC: 12.4 G/DL (ref 11.5–15.4)
IMM GRANULOCYTES # BLD AUTO: 0.13 THOUSAND/UL (ref 0–0.2)
IMM GRANULOCYTES NFR BLD AUTO: 1 % (ref 0–2)
LYMPHOCYTES # BLD AUTO: 1.57 THOUSANDS/ΜL (ref 0.6–4.47)
LYMPHOCYTES NFR BLD AUTO: 14 % (ref 14–44)
MCH RBC QN AUTO: 30.2 PG (ref 26.8–34.3)
MCHC RBC AUTO-ENTMCNC: 32.9 G/DL (ref 31.4–37.4)
MCV RBC AUTO: 92 FL (ref 82–98)
MONOCYTES # BLD AUTO: 0.57 THOUSAND/ΜL (ref 0.17–1.22)
MONOCYTES NFR BLD AUTO: 5 % (ref 4–12)
NEUTROPHILS # BLD AUTO: 8.82 THOUSANDS/ΜL (ref 1.85–7.62)
NEUTS SEG NFR BLD AUTO: 79 % (ref 43–75)
NRBC BLD AUTO-RTO: 0 /100 WBCS
PLATELET # BLD AUTO: 254 THOUSANDS/UL (ref 149–390)
PMV BLD AUTO: 10.3 FL (ref 8.9–12.7)
RBC # BLD AUTO: 4.11 MILLION/UL (ref 3.81–5.12)
WBC # BLD AUTO: 11.26 THOUSAND/UL (ref 4.31–10.16)

## 2020-01-21 PROCEDURE — 85025 COMPLETE CBC W/AUTO DIFF WBC: CPT

## 2020-01-21 PROCEDURE — 82950 GLUCOSE TEST: CPT | Performed by: STUDENT IN AN ORGANIZED HEALTH CARE EDUCATION/TRAINING PROGRAM

## 2020-01-21 PROCEDURE — 36415 COLL VENOUS BLD VENIPUNCTURE: CPT

## 2020-01-21 PROCEDURE — 86592 SYPHILIS TEST NON-TREP QUAL: CPT

## 2020-01-22 LAB — RPR SER QL: NORMAL

## 2020-01-24 ENCOUNTER — TELEPHONE (OUTPATIENT)
Dept: OBGYN CLINIC | Facility: CLINIC | Age: 35
End: 2020-01-24

## 2020-01-24 NOTE — TELEPHONE ENCOUNTER
----- Message from Suyapa Gan MD sent at 1/22/2020  9:19 AM EST -----  Please let Christopher Ballard know that her 28 wk labs are normal  Thanks!

## 2020-01-24 NOTE — TELEPHONE ENCOUNTER
Called pt to review 28 week lab results- no answer; voicemail box full  Sent out negative results card via mail

## 2020-02-05 ENCOUNTER — ROUTINE PRENATAL (OUTPATIENT)
Dept: OBGYN CLINIC | Age: 35
End: 2020-02-05
Payer: COMMERCIAL

## 2020-02-05 VITALS — DIASTOLIC BLOOD PRESSURE: 64 MMHG | SYSTOLIC BLOOD PRESSURE: 118 MMHG

## 2020-02-05 DIAGNOSIS — Z23 NEED FOR TDAP VACCINATION: Primary | ICD-10-CM

## 2020-02-05 DIAGNOSIS — O09.522 MULTIGRAVIDA OF ADVANCED MATERNAL AGE IN SECOND TRIMESTER: ICD-10-CM

## 2020-02-05 DIAGNOSIS — R21 RASH, SKIN: ICD-10-CM

## 2020-02-05 DIAGNOSIS — O44.42 LOW LYING PLACENTA NOS OR WITHOUT HEMORRHAGE, SECOND TRIMESTER: ICD-10-CM

## 2020-02-05 DIAGNOSIS — Z34.82 ENCOUNTER FOR SUPERVISION OF OTHER NORMAL PREGNANCY, SECOND TRIMESTER: ICD-10-CM

## 2020-02-05 LAB
SL AMB  POCT GLUCOSE, UA: NEGATIVE
SL AMB POCT URINE PROTEIN: POSITIVE

## 2020-02-05 PROCEDURE — 90715 TDAP VACCINE 7 YRS/> IM: CPT

## 2020-02-05 PROCEDURE — 90471 IMMUNIZATION ADMIN: CPT

## 2020-02-05 PROCEDURE — PNV: Performed by: NURSE PRACTITIONER

## 2020-02-05 RX ORDER — NYSTATIN 100000 [USP'U]/G
POWDER TOPICAL 2 TIMES DAILY
Qty: 30 G | Refills: 1 | Status: SHIPPED | OUTPATIENT
Start: 2020-02-05 | End: 2020-03-06 | Stop reason: ALTCHOICE

## 2020-02-05 NOTE — PROGRESS NOTES
Patient is here for prenatal routine visit  29 wk 0 D       GFM    Patient denies any loss of fluid, bleeding or cramping  POCT urine today is positive 1 + for protein and negative for glucose  Fetal kick sheet given  Patient declined breast pump as she is not interested in breast feeding  T-dap administered on left deltoid, patient tolerated well

## 2020-02-05 NOTE — PATIENT INSTRUCTIONS
Pregnancy at [address] to [address]:   What changes are happening in my body? You may continue to have symptoms such as shortness of breath, heartburn, contractions, or swelling of your ankles and feet  You may be gaining about 1 pound a week now  How do I care for myself at this stage of my pregnancy? · Eat a variety of healthy foods  Healthy foods include fruits, vegetables, whole-grain breads, low-fat dairy foods, beans, lean meats, and fish  Drink liquids as directed  Ask how much liquid to drink each day and which liquids are best for you  Limit caffeine to less than 200 milligrams each day  Limit your intake of fish to 2 servings each week  Choose fish low in mercury such as canned light tuna, shrimp, salmon, cod, or tilapia  Do not  eat fish high in mercury such as swordfish, tilefish, aviva mackerel, and shark  · Manage heartburn  by eating 4 or 5 small meals each day instead of large meals  Avoid spicy food  · Manage swelling  by lying down and putting your feet up  · Take prenatal vitamins as directed  Your need for certain vitamins and minerals, such as folic acid, increases during pregnancy  Prenatal vitamins provide some of the extra vitamins and minerals you need  Prenatal vitamins may also help to decrease the risk of certain birth defects  · Talk to your healthcare provider about exercise  Moderate exercise can help you stay fit  Your healthcare provider will help you plan an exercise program that is safe for you during pregnancy  · Do not smoke  If you smoke, it is never too late to quit  Smoking increases your risk of a miscarriage and other health problems during your pregnancy  Smoking can cause your baby to be born too early or weigh less at birth  Ask your healthcare provider for information if you need help quitting  · Do not drink alcohol  Alcohol passes from your body to your baby through the placenta   It can affect your baby's brain development and cause fetal alcohol syndrome (FAS)  FAS is a group of conditions that causes mental, behavior, and growth problems  · Talk to your healthcare provider before you take any medicines  Many medicines may harm your baby if you take them when you are pregnant  Do not take any medicines, vitamins, herbs, or supplements without first talking to your healthcare provider  Never use illegal or street drugs (such as marijuana or cocaine) while you are pregnant  What are some safety tips during pregnancy? · Avoid hot tubs and saunas  Do not use a hot tub or sauna while you are pregnant, especially during your first trimester  Hot tubs and saunas may raise your baby's temperature and increase the risk of birth defects  · Avoid toxoplasmosis  This is an infection caused by eating raw meat or being around infected cat feces  It can cause birth defects, miscarriages, and other problems  Wash your hands after you touch raw meat  Make sure any meat is well-cooked before you eat it  Avoid raw eggs and unpasteurized milk  Use gloves or ask someone else to clean your cat's litter box while you are pregnant  What changes are happening with my baby? By 34 weeks, your baby may weigh more than 5 pounds  Your baby will be about 12 ½ inches long from the top of the head to the rump (baby's bottom)  Your baby is gaining about ½ pound a week  Your baby's eyes open and close now  Your baby's kicks and movements are more forceful at this time  What do I need to know about prenatal care? Your healthcare provider will check your blood pressure and weight  You may also need the following:  · A urine test  may also be done to check for sugar and protein  These can be signs of gestational diabetes or infection  Protein in your urine may also be a sign of preeclampsia  Preeclampsia is a condition that can develop during week 20 or later of your pregnancy   It causes high blood pressure, and it can cause problems with your kidneys and other organs  · A Tdap vaccine  may be recommended by your healthcare provider  · Fundal height  is a measurement of your uterus to check your baby's growth  This number is usually the same as the number of weeks that you have been pregnant  Your healthcare provider may also check your baby's position  · Your baby's heart rate  will be checked  When should I seek immediate care? · You develop a severe headache that does not go away  · You have new or increased vision changes, such as blurred or spotted vision  · You have new or increased swelling in your face or hands  · You have vaginal spotting or bleeding  · Your water broke or you feel warm water gushing or trickling from your vagina  When should I contact my healthcare provider? · You have more than 5 contractions in 1 hour  · You notice any changes in your baby's movements  · You have abdominal cramps, pressure, or tightening  · You have a change in vaginal discharge  · You have chills or a fever  · You have vaginal itching, burning, or pain  · You have yellow, green, white, or foul-smelling vaginal discharge  · You have pain or burning when you urinate, less urine than usual, or pink or bloody urine  · You have questions or concerns about your condition or care  CARE AGREEMENT:   You have the right to help plan your care  Learn about your health condition and how it may be treated  Discuss treatment options with your caregivers to decide what care you want to receive  You always have the right to refuse treatment  The above information is an  only  It is not intended as medical advice for individual conditions or treatments  Talk to your doctor, nurse or pharmacist before following any medical regimen to see if it is safe and effective for you    © 2017 [employer] Information is for End User's use only and may not be sold, redistributed or otherwise used for commercial purposes  All illustrations and images included in CareNotes® are the copyrighted property of A D A M [employer]  or Henri Fuller

## 2020-02-05 NOTE — ASSESSMENT & PLAN NOTE
BP normotensive, denies any HA, visual changes, RUQ pain, no swelling  Patient instructed to report any of these changes

## 2020-02-05 NOTE — PROGRESS NOTES
Rash, skin  Patient c/o rash under ac breast, previously RX'd nystatin cream   Not helping  Feels that keeps area "too moist"  Will try powder for her  Advanced maternal age in multigravida  BP normotensive, denies any HA, visual changes, RUQ pain, no swelling  Patient instructed to report any of these changes  Low lying placenta nos or without hemorrhage, second trimester  US scheduled at the end of the week she states to check placenta and fetal growth again    Encounter for supervision of other normal pregnancy, second trimester  Return OB  Denies LOF, vag blding, ctxs, cramping and reports good FM  Gender is a surprise! Taking PNV daily as prescribed  Denies any problems today  TDAP given today  Reviewed PTL precautions, [address] also reviewed  Discussed with patient the benefits, contraindications and side effects of the following vaccines: TDAP  Discussed pertussis components of the vaccine

## 2020-02-05 NOTE — ASSESSMENT & PLAN NOTE
Return OB  Denies LOF, vag blding, ctxs, cramping and reports good FM  Gender is a surprise! Taking PNV daily as prescribed  Denies any problems today  TDAP given today  Reviewed PTL precautions, [address] also reviewed  Discussed with patient the benefits, contraindications and side effects of the following vaccines: TDAP  Discussed pertussis components of the vaccine

## 2020-02-05 NOTE — ASSESSMENT & PLAN NOTE
Patient c/o rash under ac breast, previously RX'd nystatin cream   Not helping  Feels that keeps area "too moist"  Will try powder for her

## 2020-02-07 ENCOUNTER — ULTRASOUND (OUTPATIENT)
Dept: PERINATAL CARE | Facility: OTHER | Age: 35
End: 2020-02-07
Payer: COMMERCIAL

## 2020-02-07 VITALS
BODY MASS INDEX: 36.5 KG/M2 | DIASTOLIC BLOOD PRESSURE: 81 MMHG | WEIGHT: 206 LBS | HEIGHT: 63 IN | SYSTOLIC BLOOD PRESSURE: 123 MMHG | HEART RATE: 76 BPM

## 2020-02-07 DIAGNOSIS — O99.212 OBESITY AFFECTING PREGNANCY IN SECOND TRIMESTER: ICD-10-CM

## 2020-02-07 DIAGNOSIS — O44.42 LOW LYING PLACENTA NOS OR WITHOUT HEMORRHAGE, SECOND TRIMESTER: ICD-10-CM

## 2020-02-07 DIAGNOSIS — Z3A.29 29 WEEKS GESTATION OF PREGNANCY: ICD-10-CM

## 2020-02-07 DIAGNOSIS — Z36.89 ENCOUNTER FOR ULTRASOUND TO ASSESS FETAL GROWTH: ICD-10-CM

## 2020-02-07 DIAGNOSIS — O44.40 LOW-LYING PLACENTA: ICD-10-CM

## 2020-02-07 PROCEDURE — 76816 OB US FOLLOW-UP PER FETUS: CPT | Performed by: OBSTETRICS & GYNECOLOGY

## 2020-02-07 PROCEDURE — 1036F TOBACCO NON-USER: CPT | Performed by: OBSTETRICS & GYNECOLOGY

## 2020-02-07 PROCEDURE — 76817 TRANSVAGINAL US OBSTETRIC: CPT | Performed by: OBSTETRICS & GYNECOLOGY

## 2020-02-07 PROCEDURE — 99213 OFFICE O/P EST LOW 20 MIN: CPT | Performed by: OBSTETRICS & GYNECOLOGY

## 2020-02-07 NOTE — PROGRESS NOTES
[address]: Ms Rodrigo Anaya was seen today at 29w1d for fetal growth, follow-up missed anatomy and transvaginal ultrasound  See ultrasound report under "OB Procedures" tab    Please don't hesitate to contact our office with any concerns or questions   -Tony Cox MD

## 2020-02-10 ENCOUNTER — TELEPHONE (OUTPATIENT)
Dept: OBGYN CLINIC | Facility: CLINIC | Age: 35
End: 2020-02-10

## 2020-02-10 NOTE — TELEPHONE ENCOUNTER
Patient is currently 29 weeks pregnant  She recently got the whopping cough shot and now she reports vomiting every day since  Patient states she has soreness in her arm but does not have a fever  Please advise

## 2020-02-10 NOTE — TELEPHONE ENCOUNTER
vbox full, jesus said dr schwab advises sips of wats as viruses in pregnancy can take longer to get better

## 2020-02-10 NOTE — TELEPHONE ENCOUNTER
Not sure what is causing sx  G3, P1 29 and 4  On 2/5 had dpt shot  On 2/7, at [facility], docs tried to move baby because of a previa  They could not move baby  On Sat, pt began with nausea, vomiting, and gastric distress  Still has gastric distress  Possibly just virus - but all other possibilities mentioned  I told her brat diet, keep hydrated- small amts  Any ideas?   Thanks  Rosalind Bender  [birth year]  TT to ED

## 2020-02-11 NOTE — TELEPHONE ENCOUNTER
Pt advised, she is feeling better with the [id number] but is concerned about the chest pain, advised to call us back if not better by tomorrow, advised if this gets worse to go to er, but according to dr figueroa tt its gas related from the bug

## 2020-02-19 ENCOUNTER — ROUTINE PRENATAL (OUTPATIENT)
Dept: OBGYN CLINIC | Facility: CLINIC | Age: 35
End: 2020-02-19

## 2020-02-19 VITALS — DIASTOLIC BLOOD PRESSURE: 70 MMHG | WEIGHT: 207 LBS | BODY MASS INDEX: 36.67 KG/M2 | SYSTOLIC BLOOD PRESSURE: 118 MMHG

## 2020-02-19 DIAGNOSIS — R21 RASH, SKIN: ICD-10-CM

## 2020-02-19 DIAGNOSIS — O09.523 MULTIGRAVIDA OF ADVANCED MATERNAL AGE IN THIRD TRIMESTER: ICD-10-CM

## 2020-02-19 DIAGNOSIS — O44.43 LOW-LYING PLACENTA IN THIRD TRIMESTER: ICD-10-CM

## 2020-02-19 DIAGNOSIS — Z34.03 ENCOUNTER FOR SUPERVISION OF NORMAL FIRST PREGNANCY IN THIRD TRIMESTER: Primary | ICD-10-CM

## 2020-02-19 PROBLEM — Z3A.29 29 WEEKS GESTATION OF PREGNANCY: Status: RESOLVED | Noted: 2020-02-07 | Resolved: 2020-02-19

## 2020-02-19 PROBLEM — O09.893 SUPERVISION OF OTHER HIGH RISK PREGNANCIES, THIRD TRIMESTER: Status: ACTIVE | Noted: 2020-02-05

## 2020-02-19 PROCEDURE — PNV: Performed by: NURSE PRACTITIONER

## 2020-02-19 NOTE — PATIENT INSTRUCTIONS
Pregnancy at [address]:   What changes are happening in my body? You may continue to have symptoms such as shortness of breath, heartburn, contractions, or swelling of your ankles and feet  You may be gaining about 1 pound a week now  How do I care for myself at this stage of my pregnancy? · Eat a variety of healthy foods  Healthy foods include fruits, vegetables, whole-grain breads, low-fat dairy foods, beans, lean meats, and fish  Drink liquids as directed  Ask how much liquid to drink each day and which liquids are best for you  Limit caffeine to less than 200 milligrams each day  Limit your intake of fish to 2 servings each week  Choose fish low in mercury such as canned light tuna, shrimp, salmon, cod, or tilapia  Do not  eat fish high in mercury such as swordfish, tilefish, aviva mackerel, and shark  · Manage heartburn  by eating 4 or 5 small meals each day instead of large meals  Avoid spicy food  · Manage swelling  by lying down and putting your feet up  · Take prenatal vitamins as directed  Your need for certain vitamins and minerals, such as folic acid, increases during pregnancy  Prenatal vitamins provide some of the extra vitamins and minerals you need  Prenatal vitamins may also help to decrease the risk of certain birth defects  · Talk to your healthcare provider about exercise  Moderate exercise can help you stay fit  Your healthcare provider will help you plan an exercise program that is safe for you during pregnancy  · Do not smoke  If you smoke, it is never too late to quit  Smoking increases your risk of a miscarriage and other health problems during your pregnancy  Smoking can cause your baby to be born too early or weigh less at birth  Ask your healthcare provider for information if you need help quitting  · Do not drink alcohol  Alcohol passes from your body to your baby through the placenta   It can affect your baby's brain development and cause fetal alcohol syndrome (FAS)  FAS is a group of conditions that causes mental, behavior, and growth problems  · Talk to your healthcare provider before you take any medicines  Many medicines may harm your baby if you take them when you are pregnant  Do not take any medicines, vitamins, herbs, or supplements without first talking to your healthcare provider  Never use illegal or street drugs (such as marijuana or cocaine) while you are pregnant  What are some safety tips during pregnancy? · Avoid hot tubs and saunas  Do not use a hot tub or sauna while you are pregnant, especially during your first trimester  Hot tubs and saunas may raise your baby's temperature and increase the risk of birth defects  · Avoid toxoplasmosis  This is an infection caused by eating raw meat or being around infected cat feces  It can cause birth defects, miscarriages, and other problems  Wash your hands after you touch raw meat  Make sure any meat is well-cooked before you eat it  Avoid raw eggs and unpasteurized milk  Use gloves or ask someone else to clean your cat's litter box while you are pregnant  What changes are happening with my baby? By 34 weeks, your baby may weigh more than 5 pounds  Your baby will be about 12 ½ inches long from the top of the head to the rump (baby's bottom)  Your baby is gaining about ½ pound a week  Your baby's eyes open and close now  Your baby's kicks and movements are more forceful at this time  What do I need to know about prenatal care? Your healthcare provider will check your blood pressure and weight  You may also need the following:  · A urine test  may also be done to check for sugar and protein  These can be signs of gestational diabetes or infection  Protein in your urine may also be a sign of preeclampsia  Preeclampsia is a condition that can develop during week 20 or later of your pregnancy   It causes high blood pressure, and it can cause problems with your kidneys and other organs  · A Tdap vaccine  may be recommended by your healthcare provider  · Fundal height  is a measurement of your uterus to check your baby's growth  This number is usually the same as the number of weeks that you have been pregnant  Your healthcare provider may also check your baby's position  · Your baby's heart rate  will be checked  When should I seek immediate care? · You develop a severe headache that does not go away  · You have new or increased vision changes, such as blurred or spotted vision  · You have new or increased swelling in your face or hands  · You have vaginal spotting or bleeding  · Your water broke or you feel warm water gushing or trickling from your vagina  When should I contact my healthcare provider? · You have more than 5 contractions in 1 hour  · You notice any changes in your baby's movements  · You have abdominal cramps, pressure, or tightening  · You have a change in vaginal discharge  · You have chills or a fever  · You have vaginal itching, burning, or pain  · You have yellow, green, white, or foul-smelling vaginal discharge  · You have pain or burning when you urinate, less urine than usual, or pink or bloody urine  · You have questions or concerns about your condition or care  CARE AGREEMENT:   You have the right to help plan your care  Learn about your health condition and how it may be treated  Discuss treatment options with your caregivers to decide what care you want to receive  You always have the right to refuse treatment  The above information is an  only  It is not intended as medical advice for individual conditions or treatments  Talk to your doctor, nurse or pharmacist before following any medical regimen to see if it is safe and effective for you    © 2017 [employer] Information is for End User's use only and may not be sold, redistributed or otherwise used for commercial purposes  All illustrations and images included in CareNotes® are the copyrighted property of A D A M [employer]  or Henri Fuller

## 2020-02-19 NOTE — PROGRESS NOTES
Problem List Items Addressed This Visit        Musculoskeletal and Integument    Rash, skin       Other    Advanced maternal age in multigravida    Low lying placenta nos or without hemorrhage, second trimester      Other Visit Diagnoses     Encounter for supervision of normal first pregnancy in third trimester    -  Primary        Feels well  Denies LOF/CTX/VB  No concerns  Discussed fetal kick counting  FG recheck on 3/6  Feels rash is still present under her breasts but I feel it had improved  She will try hydrocortisone and if not will see [facility]

## 2020-03-04 ENCOUNTER — ROUTINE PRENATAL (OUTPATIENT)
Dept: OBGYN CLINIC | Facility: CLINIC | Age: 35
End: 2020-03-04

## 2020-03-04 VITALS — SYSTOLIC BLOOD PRESSURE: 112 MMHG | BODY MASS INDEX: 36.92 KG/M2 | DIASTOLIC BLOOD PRESSURE: 70 MMHG | WEIGHT: 208.4 LBS

## 2020-03-04 DIAGNOSIS — O34.10 UTERINE FIBROID IN PREGNANCY: ICD-10-CM

## 2020-03-04 DIAGNOSIS — O99.213 OBESITY AFFECTING PREGNANCY IN THIRD TRIMESTER: ICD-10-CM

## 2020-03-04 DIAGNOSIS — D25.9 UTERINE FIBROID IN PREGNANCY: ICD-10-CM

## 2020-03-04 DIAGNOSIS — Z34.93 PREGNANCY, OBSTETRICAL CARE, THIRD TRIMESTER: Primary | ICD-10-CM

## 2020-03-04 DIAGNOSIS — O09.523 MULTIGRAVIDA OF ADVANCED MATERNAL AGE IN THIRD TRIMESTER: ICD-10-CM

## 2020-03-04 DIAGNOSIS — O44.42 LOW LYING PLACENTA NOS OR WITHOUT HEMORRHAGE, SECOND TRIMESTER: ICD-10-CM

## 2020-03-04 PROCEDURE — PNV: Performed by: STUDENT IN AN ORGANIZED HEALTH CARE EDUCATION/TRAINING PROGRAM

## 2020-03-04 NOTE — PROGRESS NOTES
Doing well; no complaints  Rash has gotten better  Good fm  Not planning to breastfeed  Unable to void

## 2020-03-04 NOTE — PROGRESS NOTES
27 y o   at [address] [facility] presents for routine prenatal visit  She denies contractions/leakage of fluid/vaginal bleeding  She feels good fetal movement     Problem List Items Addressed This Visit     Uterine fibroid in pregnancy    Obesity complicating pregnancy    Advanced maternal age in multigravida    Low lying placenta nos or without hemorrhage, second trimester  -repeat US on 3/6    Pregnancy, obstetrical care, third trimester    -  Primary  -f/u in 2 wks or PRN

## 2020-03-05 NOTE — PATIENT INSTRUCTIONS
Thank you for choosing us for your  care today  If you have any questions about your ultrasound or care, please do not hesitate to contact us or your primary obstetrician  Some general instructions for your pregnancy are:     Exercise: we encourage most pregnant women to get regular physical activity in pregnancy  Exercise has been shown to reduce the risk of several pregnancy-related complications  Unless instructed otherwise, you can aim for 22 minutes per day (150 minutes per week! )   Nutrition: aim for calcium-rich and iron-rich foods as well as healthy sources of protein   Weight: ask your doctor what is the appropriate amount of weight for you to gain in pregnancy  We have nutritionists here if you would like to meet with them   Protect against the flu: get yourself and your entire household vaccinated against influenza  Tell your partner to get vaccinated as well  Good hand hygiene can reduce the spread of this potentially deadly virus  Insist that everyone who is going to hold or be around your baby get vaccinated   Learn about Preeclampsia: preeclampsia is a common, serious complication in pregnancy  A blood pressure of 140mmHg (top number or systolic) OR 38VADX (bottom number or diastolic) is elevated and needs evaluation by your doctor  Ask your doctor early in pregnancy if you should take aspirin (not motrin or tylenol) to prevent preeclampsia  If you were advised to take aspirin to prevent preeclampsia, a daily dose of 162mg or 81mg is advised  One resource to learn more is www  preeclampsia org    If you smoke, try to reduce how many cigarettes you smoke or quit completely  Do not vape   Other warning signs to watch out for in pregnancy or postpartum: chest pain, obstructed breathing or shortness of breath, seizures, thoughts of hurting yourself or your baby, bleeding, a painful or swollen leg, fever, or headache ([facility] POST-BIRTH Warning Signs campaign)    If these happen call 911  Itching is also not normal in pregnancy and if you experience this, especially over your hands and feet, potentially worse at night, notify your doctors  Here is a handout on preeclampsia:    Preeclampsia   WHAT YOU NEED TO KNOW:   What is preeclampsia? Preeclampsia is a condition that can develop during week 20 or later of your pregnancy  Preeclampsia means you have high blood pressure and may have protein in your urine  Preeclampsia can cause mild to life-threatening health problems for you and your unborn baby  What are the signs and symptoms of preeclampsia? You may not have any symptoms  Severe preeclampsia may cause any of the following symptoms:  · Swollen face and hands    · A sudden weight gain of 5 pounds or more    · Headache    · Spotted or blurred vision     · Pain in your upper abdomen  What increases my risk for preeclampsia? · First pregnancy    · Pregnant with twins or multiples    · Personal or family history of preeclampsia or eclampsia    · Overweight    · Diabetes, high blood pressure, or kidney disease    · Age older than 40 years  How is preeclampsia diagnosed? · A blood pressure  of 140/90 mmHg or more for at least 2 readings may mean you have preeclampsia  Your blood pressure will need to be checked 1 to 2 times a week until your baby is born  · Blood tests  are done to check your liver and kidney function  You may need blood tests every week while you are pregnant  · Urine tests  are used to check for protein  You may need to give healthcare providers a urine sample at each visit  You may also need to collect your urine every time you urinate for 24 hours  How will my unborn baby be monitored? You may need to keep track of how often your baby moves or kicks over a certain amount of time  Ask your healthcare provider how to do kick counts and how often to do them   You may also need the following tests at each visit until your baby is born:  · A fetal biophysical profile  combines a nonstress test and an ultrasound of your unborn baby  The nonstress test measures changes in your baby's heartbeat when he moves  The ultrasound will show your baby's movement, growth, and how his breathing muscles are working  Healthcare providers can check the amount of fluid around your baby  The ultrasound will also show how well your baby's lungs are working  · An umbilical cord Doppler  checks blood flow through the umbilical cord  How is preeclampsia treated? · Medicines  may be given to lower your blood pressure, protect your organs, or prevent seizures  Low doses of aspirin after 12 weeks of pregnancy may be recommended if you are at high risk for preeclampsia  Aspirin may help prevent preeclampsia or problems that can happen from preeclampsia  Do not take aspirin unless directed by your healthcare provider  · Rest  as directed  Your healthcare provider may tell you to rest more often if you have mild symptoms of preeclampsia  Lie on your left side as often as you can  You may need complete bedrest if you have more severe symptoms  You may need to be in the hospital if your condition worsens  · Delivery  usually stops preeclampsia  Healthcare providers may deliver your baby right away if he is full-term (37 weeks or more)  He may need to be delivered early if you or the baby has life-threatening symptoms  What are the risks of preeclampsia? Your baby may not grow as he should and may need to be delivered early  Placental abruption can occur if the placenta pulls away from the uterus too soon  This condition is life-threatening for your baby  High blood pressure that is not controlled can lead to blood clots, kidney or liver failure, or stroke  Severe preeclampsia can cause seizures or coma  This condition is called eclampsia  Eclampsia is a life-threatening condition for you and your unborn baby     Call 911 for any of the following:   · You have a seizure  · You have severe abdominal pain with nausea and vomiting  When should I seek immediate care? · You develop a severe headache that does not go away  · You have blurred or spotted vision that does not go away  · You are bleeding from your vagina  · You have new or increased swelling in your face or hands  · You are urinating little or not at all  When should I contact my healthcare provider? · You are urinating less than usual      · You do not feel your baby's movements as often as usual     · You have questions or concerns about your condition or care  CARE AGREEMENT:   You have the right to help plan your care  Learn about your health condition and how it may be treated  Discuss treatment options with your caregivers to decide what care you want to receive  You always have the right to refuse treatment  The above information is an  only  It is not intended as medical advice for individual conditions or treatments  Talk to your doctor, nurse or pharmacist before following any medical regimen to see if it is safe and effective for you  © 2017 [address] [employer] Information is for End User's use only and may not be sold, redistributed or otherwise used for commercial purposes  All illustrations and images included in CareNotes® are the copyrighted property of A D A M , Inc  or Henri Fuller

## 2020-03-06 ENCOUNTER — ULTRASOUND (OUTPATIENT)
Dept: PERINATAL CARE | Facility: OTHER | Age: 35
End: 2020-03-06
Payer: COMMERCIAL

## 2020-03-06 VITALS
DIASTOLIC BLOOD PRESSURE: 80 MMHG | HEART RATE: 77 BPM | BODY MASS INDEX: 37.21 KG/M2 | SYSTOLIC BLOOD PRESSURE: 138 MMHG | HEIGHT: 63 IN | WEIGHT: 210 LBS

## 2020-03-06 DIAGNOSIS — IMO0002 EVALUATE ANATOMY NOT SEEN ON PRIOR SONOGRAM: ICD-10-CM

## 2020-03-06 DIAGNOSIS — O16.3: ICD-10-CM

## 2020-03-06 DIAGNOSIS — O99.213 OBESITY AFFECTING PREGNANCY IN THIRD TRIMESTER: ICD-10-CM

## 2020-03-06 DIAGNOSIS — O09.523 MULTIGRAVIDA OF ADVANCED MATERNAL AGE IN THIRD TRIMESTER: Primary | ICD-10-CM

## 2020-03-06 DIAGNOSIS — Z36.89 ULTRASOUND SCAN TO EVALUATE PLACENTA LOCATION: ICD-10-CM

## 2020-03-06 PROBLEM — O44.42 LOW LYING PLACENTA NOS OR WITHOUT HEMORRHAGE, SECOND TRIMESTER: Status: RESOLVED | Noted: 2019-12-12 | Resolved: 2020-03-06

## 2020-03-06 PROCEDURE — 76816 OB US FOLLOW-UP PER FETUS: CPT | Performed by: OBSTETRICS & GYNECOLOGY

## 2020-03-06 PROCEDURE — 76817 TRANSVAGINAL US OBSTETRIC: CPT | Performed by: OBSTETRICS & GYNECOLOGY

## 2020-03-06 PROCEDURE — 99212 OFFICE O/P EST SF 10 MIN: CPT | Performed by: OBSTETRICS & GYNECOLOGY

## 2020-03-06 PROCEDURE — 1036F TOBACCO NON-USER: CPT | Performed by: OBSTETRICS & GYNECOLOGY

## 2020-03-06 NOTE — PROGRESS NOTES
A transvaginal ultrasound was performed  Sonographer note on use of High Level Disinfection Process (Trophon) for transvaginal probe# 1 used, serial [id number]     Sinda Call

## 2020-03-06 NOTE — PROGRESS NOTES
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) and her  not coming to support her at the ultrasound visit  Very tearful today  She has no symptoms of preeclampsia  Labs were ordered  We reviewed the warning signs and symptoms of preeclampsia that should prompt her to call her physician or go to the hospital            Relevant Orders    CBC    Comprehensive metabolic panel    Uric acid    Protein / creatinine ratio, urine       Other    Obesity complicating pregnancy    Advanced maternal age in multigravida - Primary      Other Visit Diagnoses     Ultrasound scan to evaluate placenta location        Evaluate anatomy not seen on prior sonogram                Please don't hesitate to contact our office with any concerns or questions    Bekah Vance MD

## 2020-03-06 NOTE — ASSESSMENT & PLAN NOTE
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

## 2020-03-09 ENCOUNTER — APPOINTMENT (OUTPATIENT)
Dept: LAB | Facility: HOSPITAL | Age: 35
End: 2020-03-09
Payer: COMMERCIAL

## 2020-03-09 DIAGNOSIS — O16.3: ICD-10-CM

## 2020-03-09 LAB
ALBUMIN SERPL BCP-MCNC: 2.6 G/DL (ref 3.5–5)
ALP SERPL-CCNC: 92 U/L (ref 46–116)
ALT SERPL W P-5'-P-CCNC: 17 U/L (ref 12–78)
ANION GAP SERPL CALCULATED.3IONS-SCNC: 11 MMOL/L (ref 4–13)
AST SERPL W P-5'-P-CCNC: 13 U/L (ref 5–45)
BILIRUB SERPL-MCNC: 0.2 MG/DL (ref 0.2–1)
BUN SERPL-MCNC: 11 MG/DL (ref 5–25)
CALCIUM SERPL-MCNC: 8.7 MG/DL (ref 8.3–10.1)
CHLORIDE SERPL-SCNC: 101 MMOL/L (ref 100–108)
CO2 SERPL-SCNC: 23 MMOL/L (ref 21–32)
CREAT SERPL-MCNC: 0.9 MG/DL (ref 0.6–1.3)
CREAT UR-MCNC: 37.9 MG/DL
ERYTHROCYTE [DISTWIDTH] IN BLOOD BY AUTOMATED COUNT: 14.1 % (ref 11.6–15.1)
GFR SERPL CREATININE-BSD FRML MDRD: 83 ML/MIN/1.73SQ M
GLUCOSE SERPL-MCNC: 76 MG/DL (ref 65–140)
HCT VFR BLD AUTO: 38.9 % (ref 34.8–46.1)
HGB BLD-MCNC: 12.5 G/DL (ref 11.5–15.4)
MCH RBC QN AUTO: 29 PG (ref 26.8–34.3)
MCHC RBC AUTO-ENTMCNC: 32.1 G/DL (ref 31.4–37.4)
MCV RBC AUTO: 90 FL (ref 82–98)
PLATELET # BLD AUTO: 229 THOUSANDS/UL (ref 149–390)
PMV BLD AUTO: 10.6 FL (ref 8.9–12.7)
POTASSIUM SERPL-SCNC: 3.8 MMOL/L (ref 3.5–5.3)
PROT SERPL-MCNC: 7.2 G/DL (ref 6.4–8.2)
PROT UR-MCNC: <6 MG/DL
PROT/CREAT UR: <0.16 MG/G{CREAT} (ref 0–0.1)
RBC # BLD AUTO: 4.31 MILLION/UL (ref 3.81–5.12)
SODIUM SERPL-SCNC: 135 MMOL/L (ref 136–145)
URATE SERPL-MCNC: 4.5 MG/DL (ref 2–6.8)
WBC # BLD AUTO: 9.8 THOUSAND/UL (ref 4.31–10.16)

## 2020-03-09 PROCEDURE — 80053 COMPREHEN METABOLIC PANEL: CPT

## 2020-03-09 PROCEDURE — 84156 ASSAY OF PROTEIN URINE: CPT | Performed by: OBSTETRICS & GYNECOLOGY

## 2020-03-09 PROCEDURE — 85027 COMPLETE CBC AUTOMATED: CPT

## 2020-03-09 PROCEDURE — 84550 ASSAY OF BLOOD/URIC ACID: CPT

## 2020-03-09 PROCEDURE — 82570 ASSAY OF URINE CREATININE: CPT | Performed by: OBSTETRICS & GYNECOLOGY

## 2020-03-09 PROCEDURE — 36415 COLL VENOUS BLD VENIPUNCTURE: CPT

## 2020-03-10 ENCOUNTER — TELEPHONE (OUTPATIENT)
Dept: PERINATAL CARE | Facility: OTHER | Age: 35
End: 2020-03-10

## 2020-03-10 DIAGNOSIS — R79.89 ELEVATED SERUM CREATININE: Primary | ICD-10-CM

## 2020-03-10 NOTE — TELEPHONE ENCOUNTER
Reviewed results of serum and urine testing by phone, pt aware of recommendation for 1wk followup, no obvious etiology, creatinine about a year ago was higher      Aline Weinberg MD

## 2020-03-19 ENCOUNTER — ROUTINE PRENATAL (OUTPATIENT)
Dept: OBGYN CLINIC | Facility: CLINIC | Age: 35
End: 2020-03-19

## 2020-03-19 VITALS
SYSTOLIC BLOOD PRESSURE: 122 MMHG | DIASTOLIC BLOOD PRESSURE: 70 MMHG | WEIGHT: 208.6 LBS | BODY MASS INDEX: 36.96 KG/M2 | HEIGHT: 63 IN

## 2020-03-19 DIAGNOSIS — O16.3: ICD-10-CM

## 2020-03-19 DIAGNOSIS — O09.893 SUPERVISION OF OTHER HIGH RISK PREGNANCIES, THIRD TRIMESTER: Primary | ICD-10-CM

## 2020-03-19 PROCEDURE — 3008F BODY MASS INDEX DOCD: CPT | Performed by: NURSE PRACTITIONER

## 2020-03-19 PROCEDURE — PNV: Performed by: STUDENT IN AN ORGANIZED HEALTH CARE EDUCATION/TRAINING PROGRAM

## 2020-03-19 NOTE — PROGRESS NOTES
Patient is here for routine prenatal visit she is 35 weeks 0 days she is B positive blood type   Patient reports positive fetal movements   Patient had her T-dap vaccine on 10/3/2019 and patient declined  the Flu vaccine

## 2020-03-19 NOTE — PROGRESS NOTES
28 yo  at 35 0/7 weeks here for routine PNV, no complaints  Denies LOF/VB/ctx/decreased FM    Diagnoses and all orders for this visit:    Supervision of other high risk pregnancies, third trimester   - Reviewed  labor precautions   - Plan for GBS next   - Low lying placenta resolved as of 3/6/2020 ultrasound  Hypertension in obstetric context in third trimester   - BP WNL today, asymptomatic   - Preeclampsia labs within normal limits, including P:Cr ratio

## 2020-04-01 ENCOUNTER — TELEMEDICINE (OUTPATIENT)
Dept: OBGYN CLINIC | Facility: CLINIC | Age: 35
End: 2020-04-01

## 2020-04-01 DIAGNOSIS — O09.523 MULTIGRAVIDA OF ADVANCED MATERNAL AGE IN THIRD TRIMESTER: ICD-10-CM

## 2020-04-01 DIAGNOSIS — Z34.80 PRENATAL CARE OF MULTIGRAVIDA, ANTEPARTUM: Primary | ICD-10-CM

## 2020-04-01 DIAGNOSIS — R79.89 ELEVATED SERUM CREATININE: ICD-10-CM

## 2020-04-01 PROCEDURE — PNV: Performed by: NURSE PRACTITIONER

## 2020-04-09 ENCOUNTER — ROUTINE PRENATAL (OUTPATIENT)
Dept: OBGYN CLINIC | Facility: CLINIC | Age: 35
End: 2020-04-09

## 2020-04-09 VITALS — BODY MASS INDEX: 38.16 KG/M2 | DIASTOLIC BLOOD PRESSURE: 86 MMHG | WEIGHT: 215.4 LBS | SYSTOLIC BLOOD PRESSURE: 120 MMHG

## 2020-04-09 DIAGNOSIS — R79.89 ELEVATED SERUM CREATININE: ICD-10-CM

## 2020-04-09 DIAGNOSIS — O99.213 OBESITY AFFECTING PREGNANCY IN THIRD TRIMESTER: ICD-10-CM

## 2020-04-09 DIAGNOSIS — Z34.80 PRENATAL CARE OF MULTIGRAVIDA, ANTEPARTUM: Primary | ICD-10-CM

## 2020-04-09 DIAGNOSIS — O34.10 UTERINE FIBROID IN PREGNANCY: ICD-10-CM

## 2020-04-09 DIAGNOSIS — D25.9 UTERINE FIBROID IN PREGNANCY: ICD-10-CM

## 2020-04-09 DIAGNOSIS — O09.523 MULTIGRAVIDA OF ADVANCED MATERNAL AGE IN THIRD TRIMESTER: ICD-10-CM

## 2020-04-09 PROCEDURE — PNV: Performed by: NURSE PRACTITIONER

## 2020-04-09 PROCEDURE — 87653 STREP B DNA AMP PROBE: CPT | Performed by: NURSE PRACTITIONER

## 2020-04-11 LAB — GP B STREP DNA SPEC QL NAA+PROBE: ABNORMAL

## 2020-04-14 ENCOUNTER — TELEPHONE (OUTPATIENT)
Dept: OBGYN CLINIC | Facility: CLINIC | Age: 35
End: 2020-04-14

## 2020-04-16 ENCOUNTER — ROUTINE PRENATAL (OUTPATIENT)
Dept: OBGYN CLINIC | Facility: CLINIC | Age: 35
End: 2020-04-16

## 2020-04-16 VITALS — BODY MASS INDEX: 42.2 KG/M2 | WEIGHT: 238.2 LBS | DIASTOLIC BLOOD PRESSURE: 98 MMHG | SYSTOLIC BLOOD PRESSURE: 124 MMHG

## 2020-04-16 DIAGNOSIS — O16.3: ICD-10-CM

## 2020-04-16 DIAGNOSIS — O09.523 MULTIGRAVIDA OF ADVANCED MATERNAL AGE IN THIRD TRIMESTER: ICD-10-CM

## 2020-04-16 DIAGNOSIS — O09.893 SUPERVISION OF OTHER HIGH RISK PREGNANCIES, THIRD TRIMESTER: Primary | ICD-10-CM

## 2020-04-16 DIAGNOSIS — O99.213 OBESITY AFFECTING PREGNANCY IN THIRD TRIMESTER: ICD-10-CM

## 2020-04-16 PROCEDURE — PNV: Performed by: NURSE PRACTITIONER

## 2020-04-19 ENCOUNTER — TELEPHONE (OUTPATIENT)
Dept: OTHER | Facility: OTHER | Age: 35
End: 2020-04-19

## 2020-04-20 ENCOUNTER — HOSPITAL ENCOUNTER (INPATIENT)
Facility: HOSPITAL | Age: 35
LOS: 2 days | Discharge: HOME | End: 2020-04-22
Attending: OBSTETRICS & GYNECOLOGY | Admitting: OBSTETRICS & GYNECOLOGY
Payer: COMMERCIAL

## 2020-04-20 ENCOUNTER — ANESTHESIA (INPATIENT)
Dept: ANESTHESIOLOGY | Facility: HOSPITAL | Age: 35
End: 2020-04-20
Payer: COMMERCIAL

## 2020-04-20 ENCOUNTER — TELEPHONE (OUTPATIENT)
Dept: OBGYN CLINIC | Facility: CLINIC | Age: 35
End: 2020-04-20

## 2020-04-20 ENCOUNTER — ANESTHESIA EVENT (INPATIENT)
Dept: ANESTHESIOLOGY | Facility: HOSPITAL | Age: 35
End: 2020-04-20
Payer: COMMERCIAL

## 2020-04-20 DIAGNOSIS — Z3A.39 39 WEEKS GESTATION OF PREGNANCY: Primary | ICD-10-CM

## 2020-04-20 PROBLEM — O13.9 GESTATIONAL HYPERTENSION: Status: ACTIVE | Noted: 2020-04-20

## 2020-04-20 LAB
ABO GROUP BLD: NORMAL
ALBUMIN SERPL BCP-MCNC: 2.6 G/DL (ref 3.5–5)
ALP SERPL-CCNC: 110 U/L (ref 46–116)
ALT SERPL W P-5'-P-CCNC: 22 U/L (ref 12–78)
ANION GAP SERPL CALCULATED.3IONS-SCNC: 11 MMOL/L (ref 4–13)
APTT PPP: 29 SECONDS (ref 23–37)
AST SERPL W P-5'-P-CCNC: 28 U/L (ref 5–45)
BILIRUB SERPL-MCNC: 0.3 MG/DL (ref 0.2–1)
BLD GP AB SCN SERPL QL: NEGATIVE
BUN SERPL-MCNC: 9 MG/DL (ref 5–25)
CALCIUM SERPL-MCNC: 9.1 MG/DL (ref 8.3–10.1)
CHLORIDE SERPL-SCNC: 103 MMOL/L (ref 100–108)
CO2 SERPL-SCNC: 22 MMOL/L (ref 21–32)
CREAT SERPL-MCNC: 0.76 MG/DL (ref 0.6–1.3)
CREAT UR-MCNC: 35 MG/DL
ERYTHROCYTE [DISTWIDTH] IN BLOOD BY AUTOMATED COUNT: 14.9 % (ref 11.6–15.1)
FIBRINOGEN PPP-MCNC: 562 MG/DL (ref 227–495)
GFR SERPL CREATININE-BSD FRML MDRD: 101 ML/MIN/1.73SQ M
GLUCOSE SERPL-MCNC: 75 MG/DL (ref 65–140)
HCT VFR BLD AUTO: 40.1 % (ref 34.8–46.1)
HGB BLD-MCNC: 13.2 G/DL (ref 11.5–15.4)
INR PPP: 0.93 (ref 0.84–1.19)
MCH RBC QN AUTO: 29.6 PG (ref 26.8–34.3)
MCHC RBC AUTO-ENTMCNC: 32.9 G/DL (ref 31.4–37.4)
MCV RBC AUTO: 90 FL (ref 82–98)
PLATELET # BLD AUTO: 221 THOUSANDS/UL (ref 149–390)
PMV BLD AUTO: 10.8 FL (ref 8.9–12.7)
POTASSIUM SERPL-SCNC: 4.5 MMOL/L (ref 3.5–5.3)
PROT SERPL-MCNC: 7.3 G/DL (ref 6.4–8.2)
PROT UR-MCNC: <6 MG/DL
PROT/CREAT UR: <0.17 MG/G{CREAT} (ref 0–0.1)
PROTHROMBIN TIME: 11.9 SECONDS (ref 11.6–14.5)
RBC # BLD AUTO: 4.46 MILLION/UL (ref 3.81–5.12)
RH BLD: POSITIVE
SODIUM SERPL-SCNC: 136 MMOL/L (ref 136–145)
SPECIMEN EXPIRATION DATE: NORMAL
WBC # BLD AUTO: 10.23 THOUSAND/UL (ref 4.31–10.16)

## 2020-04-20 PROCEDURE — 85610 PROTHROMBIN TIME: CPT | Performed by: OBSTETRICS & GYNECOLOGY

## 2020-04-20 PROCEDURE — 3E033VJ INTRODUCTION OF OTHER HORMONE INTO PERIPHERAL VEIN, PERCUTANEOUS APPROACH: ICD-10-PCS | Performed by: OBSTETRICS & GYNECOLOGY

## 2020-04-20 PROCEDURE — 80053 COMPREHEN METABOLIC PANEL: CPT | Performed by: OBSTETRICS & GYNECOLOGY

## 2020-04-20 PROCEDURE — 85730 THROMBOPLASTIN TIME PARTIAL: CPT | Performed by: OBSTETRICS & GYNECOLOGY

## 2020-04-20 PROCEDURE — 84156 ASSAY OF PROTEIN URINE: CPT | Performed by: OBSTETRICS & GYNECOLOGY

## 2020-04-20 PROCEDURE — 85384 FIBRINOGEN ACTIVITY: CPT | Performed by: OBSTETRICS & GYNECOLOGY

## 2020-04-20 PROCEDURE — 86901 BLOOD TYPING SEROLOGIC RH(D): CPT | Performed by: OBSTETRICS & GYNECOLOGY

## 2020-04-20 PROCEDURE — 99024 POSTOP FOLLOW-UP VISIT: CPT | Performed by: OBSTETRICS & GYNECOLOGY

## 2020-04-20 PROCEDURE — 86592 SYPHILIS TEST NON-TREP QUAL: CPT | Performed by: OBSTETRICS & GYNECOLOGY

## 2020-04-20 PROCEDURE — 86850 RBC ANTIBODY SCREEN: CPT | Performed by: OBSTETRICS & GYNECOLOGY

## 2020-04-20 PROCEDURE — 99213 OFFICE O/P EST LOW 20 MIN: CPT

## 2020-04-20 PROCEDURE — G0463 HOSPITAL OUTPT CLINIC VISIT: HCPCS

## 2020-04-20 PROCEDURE — 86900 BLOOD TYPING SEROLOGIC ABO: CPT | Performed by: OBSTETRICS & GYNECOLOGY

## 2020-04-20 PROCEDURE — 4A1HXCZ MONITORING OF PRODUCTS OF CONCEPTION, CARDIAC RATE, EXTERNAL APPROACH: ICD-10-PCS | Performed by: OBSTETRICS & GYNECOLOGY

## 2020-04-20 PROCEDURE — 82570 ASSAY OF URINE CREATININE: CPT | Performed by: OBSTETRICS & GYNECOLOGY

## 2020-04-20 PROCEDURE — 85027 COMPLETE CBC AUTOMATED: CPT | Performed by: OBSTETRICS & GYNECOLOGY

## 2020-04-20 RX ORDER — OXYTOCIN/RINGER'S LACTATE 30/500 ML
1-30 PLASTIC BAG, INJECTION (ML) INTRAVENOUS
Status: DISCONTINUED | OUTPATIENT
Start: 2020-04-20 | End: 2020-04-21

## 2020-04-20 RX ORDER — LIDOCAINE HYDROCHLORIDE AND EPINEPHRINE 15; 5 MG/ML; UG/ML
INJECTION, SOLUTION EPIDURAL AS NEEDED
Status: DISCONTINUED | OUTPATIENT
Start: 2020-04-20 | End: 2020-04-21 | Stop reason: SURG

## 2020-04-20 RX ORDER — SODIUM CHLORIDE, SODIUM LACTATE, POTASSIUM CHLORIDE, CALCIUM CHLORIDE 600; 310; 30; 20 MG/100ML; MG/100ML; MG/100ML; MG/100ML
125 INJECTION, SOLUTION INTRAVENOUS CONTINUOUS
Status: DISCONTINUED | OUTPATIENT
Start: 2020-04-20 | End: 2020-04-21

## 2020-04-20 RX ORDER — ONDANSETRON 2 MG/ML
4 INJECTION INTRAMUSCULAR; INTRAVENOUS EVERY 6 HOURS PRN
Status: DISCONTINUED | OUTPATIENT
Start: 2020-04-20 | End: 2020-04-21

## 2020-04-20 RX ADMIN — ROPIVACAINE HYDROCHLORIDE: 2 INJECTION, SOLUTION EPIDURAL; INFILTRATION at 19:55

## 2020-04-20 RX ADMIN — SODIUM CHLORIDE 2.5 MILLION UNITS: 9 INJECTION, SOLUTION INTRAVENOUS at 20:56

## 2020-04-20 RX ADMIN — ONDANSETRON 4 MG: 2 INJECTION INTRAMUSCULAR; INTRAVENOUS at 21:16

## 2020-04-20 RX ADMIN — LIDOCAINE HYDROCHLORIDE AND EPINEPHRINE 2 ML: 15; 5 INJECTION, SOLUTION EPIDURAL at 19:41

## 2020-04-20 RX ADMIN — LIDOCAINE HYDROCHLORIDE AND EPINEPHRINE 3 ML: 15; 5 INJECTION, SOLUTION EPIDURAL at 19:40

## 2020-04-20 RX ADMIN — Medication 2 MILLI-UNITS/MIN: at 23:22

## 2020-04-20 RX ADMIN — SODIUM CHLORIDE, SODIUM LACTATE, POTASSIUM CHLORIDE, AND CALCIUM CHLORIDE 125 ML/HR: .6; .31; .03; .02 INJECTION, SOLUTION INTRAVENOUS at 13:22

## 2020-04-20 RX ADMIN — SODIUM CHLORIDE 5 MILLION UNITS: 0.9 INJECTION, SOLUTION INTRAVENOUS at 13:23

## 2020-04-20 RX ADMIN — SODIUM CHLORIDE 2.5 MILLION UNITS: 9 INJECTION, SOLUTION INTRAVENOUS at 16:58

## 2020-04-21 LAB
BASE EXCESS BLDCOA CALC-SCNC: -6.6 MMOL/L (ref 3–11)
BASE EXCESS BLDCOV CALC-SCNC: -4.3 MMOL/L (ref 1–9)
HCO3 BLDCOA-SCNC: 23.2 MMOL/L (ref 17.3–27.3)
HCO3 BLDCOV-SCNC: 23.1 MMOL/L (ref 12.2–28.6)
O2 CT VFR BLDCOA CALC: 4.7 ML/DL
OXYHGB MFR BLDCOA: 18.8 %
OXYHGB MFR BLDCOV: 29.2 %
PCO2 BLDCOA: 63.2 MM[HG] (ref 30–60)
PCO2 BLDCOV: 50.2 MM HG (ref 27–43)
PH BLDCOA: 7.18 [PH] (ref 7.23–7.43)
PH BLDCOV: 7.28 [PH] (ref 7.19–7.49)
PO2 BLDCOA: 14.4 MM HG (ref 5–25)
PO2 BLDCOV: 17 MM HG (ref 15–45)
RPR SER QL: NORMAL
SAO2 % BLDCOV: 7.7 ML/DL

## 2020-04-21 PROCEDURE — 0HQ9XZZ REPAIR PERINEUM SKIN, EXTERNAL APPROACH: ICD-10-PCS | Performed by: OBSTETRICS & GYNECOLOGY

## 2020-04-21 PROCEDURE — 82805 BLOOD GASES W/O2 SATURATION: CPT | Performed by: OBSTETRICS & GYNECOLOGY

## 2020-04-21 PROCEDURE — 88307 TISSUE EXAM BY PATHOLOGIST: CPT | Performed by: PATHOLOGY

## 2020-04-21 PROCEDURE — 99024 POSTOP FOLLOW-UP VISIT: CPT | Performed by: OBSTETRICS & GYNECOLOGY

## 2020-04-21 PROCEDURE — 59400 OBSTETRICAL CARE: CPT | Performed by: OBSTETRICS & GYNECOLOGY

## 2020-04-21 PROCEDURE — 88313 SPECIAL STAINS GROUP 2: CPT | Performed by: PATHOLOGY

## 2020-04-21 RX ORDER — OXYCODONE HYDROCHLORIDE 5 MG/1
5 TABLET ORAL EVERY 4 HOURS PRN
Status: DISCONTINUED | OUTPATIENT
Start: 2020-04-21 | End: 2020-04-22 | Stop reason: HOSPADM

## 2020-04-21 RX ORDER — ONDANSETRON 2 MG/ML
4 INJECTION INTRAMUSCULAR; INTRAVENOUS EVERY 8 HOURS PRN
Status: DISCONTINUED | OUTPATIENT
Start: 2020-04-21 | End: 2020-04-22 | Stop reason: HOSPADM

## 2020-04-21 RX ORDER — OXYTOCIN/RINGER'S LACTATE 30/500 ML
250 PLASTIC BAG, INJECTION (ML) INTRAVENOUS CONTINUOUS
Status: ACTIVE | OUTPATIENT
Start: 2020-04-21 | End: 2020-04-21

## 2020-04-21 RX ORDER — CALCIUM CARBONATE 200(500)MG
1000 TABLET,CHEWABLE ORAL DAILY PRN
Status: DISCONTINUED | OUTPATIENT
Start: 2020-04-21 | End: 2020-04-22 | Stop reason: HOSPADM

## 2020-04-21 RX ORDER — ACETAMINOPHEN 325 MG/1
650 TABLET ORAL EVERY 4 HOURS PRN
Status: DISCONTINUED | OUTPATIENT
Start: 2020-04-21 | End: 2020-04-22 | Stop reason: HOSPADM

## 2020-04-21 RX ORDER — DIPHENHYDRAMINE HCL 25 MG
25 TABLET ORAL EVERY 6 HOURS PRN
Status: DISCONTINUED | OUTPATIENT
Start: 2020-04-21 | End: 2020-04-22 | Stop reason: HOSPADM

## 2020-04-21 RX ORDER — DIAPER,BRIEF,INFANT-TODD,DISP
1 EACH MISCELLANEOUS 4 TIMES DAILY PRN
Status: DISCONTINUED | OUTPATIENT
Start: 2020-04-21 | End: 2020-04-22 | Stop reason: HOSPADM

## 2020-04-21 RX ORDER — IBUPROFEN 600 MG/1
600 TABLET ORAL EVERY 6 HOURS PRN
Status: DISCONTINUED | OUTPATIENT
Start: 2020-04-21 | End: 2020-04-22 | Stop reason: HOSPADM

## 2020-04-21 RX ORDER — DOCUSATE SODIUM 100 MG/1
100 CAPSULE, LIQUID FILLED ORAL 2 TIMES DAILY
Status: DISCONTINUED | OUTPATIENT
Start: 2020-04-21 | End: 2020-04-22 | Stop reason: HOSPADM

## 2020-04-21 RX ADMIN — IBUPROFEN 600 MG: 600 TABLET ORAL at 08:13

## 2020-04-21 RX ADMIN — Medication 250 MILLI-UNITS/MIN: at 01:50

## 2020-04-21 RX ADMIN — IBUPROFEN 600 MG: 600 TABLET ORAL at 20:32

## 2020-04-21 RX ADMIN — Medication: at 03:08

## 2020-04-21 RX ADMIN — DOCUSATE SODIUM 100 MG: 100 CAPSULE, LIQUID FILLED ORAL at 17:38

## 2020-04-21 RX ADMIN — DOCUSATE SODIUM 100 MG: 100 CAPSULE, LIQUID FILLED ORAL at 08:13

## 2020-04-21 RX ADMIN — WITCH HAZEL 1 PAD: 500 SOLUTION RECTAL; TOPICAL at 03:08

## 2020-04-21 RX ADMIN — HYDROCORTISONE 1 APPLICATION: 1 CREAM TOPICAL at 03:08

## 2020-04-21 RX ADMIN — SODIUM CHLORIDE 2.5 MILLION UNITS: 9 INJECTION, SOLUTION INTRAVENOUS at 00:58

## 2020-04-22 VITALS
HEIGHT: 63 IN | TEMPERATURE: 97.7 F | DIASTOLIC BLOOD PRESSURE: 79 MMHG | OXYGEN SATURATION: 99 % | RESPIRATION RATE: 18 BRPM | WEIGHT: 238 LBS | SYSTOLIC BLOOD PRESSURE: 133 MMHG | BODY MASS INDEX: 42.17 KG/M2 | HEART RATE: 77 BPM

## 2020-04-22 PROBLEM — Z34.80 PRENATAL CARE OF MULTIGRAVIDA, ANTEPARTUM: Status: RESOLVED | Noted: 2020-04-01 | Resolved: 2020-04-22

## 2020-04-22 PROBLEM — O16.3: Status: RESOLVED | Noted: 2020-03-06 | Resolved: 2020-04-22

## 2020-04-22 PROBLEM — R21 RASH, SKIN: Status: RESOLVED | Noted: 2020-02-05 | Resolved: 2020-04-22

## 2020-04-22 PROBLEM — O09.893 SUPERVISION OF OTHER HIGH RISK PREGNANCIES, THIRD TRIMESTER: Status: RESOLVED | Noted: 2020-02-05 | Resolved: 2020-04-22

## 2020-04-22 PROBLEM — R79.89 ELEVATED SERUM CREATININE: Status: RESOLVED | Noted: 2020-03-10 | Resolved: 2020-04-22

## 2020-04-22 PROBLEM — O09.529 ADVANCED MATERNAL AGE IN MULTIGRAVIDA: Status: RESOLVED | Noted: 2019-10-14 | Resolved: 2020-04-22

## 2020-04-22 PROCEDURE — 99024 POSTOP FOLLOW-UP VISIT: CPT | Performed by: OBSTETRICS & GYNECOLOGY

## 2020-04-22 RX ORDER — ACETAMINOPHEN 325 MG/1
650 TABLET ORAL EVERY 4 HOURS PRN
Qty: 30 TABLET | Refills: 0 | Status: SHIPPED | OUTPATIENT
Start: 2020-04-22 | End: 2020-08-03 | Stop reason: ALTCHOICE

## 2020-04-22 RX ORDER — IBUPROFEN 600 MG/1
600 TABLET ORAL EVERY 6 HOURS PRN
Qty: 30 TABLET | Refills: 0 | Status: SHIPPED | OUTPATIENT
Start: 2020-04-22 | End: 2020-08-03 | Stop reason: ALTCHOICE

## 2020-04-22 RX ADMIN — DOCUSATE SODIUM 100 MG: 100 CAPSULE, LIQUID FILLED ORAL at 08:26

## 2020-04-23 ENCOUNTER — TELEPHONE (OUTPATIENT)
Dept: OBGYN CLINIC | Facility: CLINIC | Age: 35
End: 2020-04-23

## 2020-04-24 ENCOUNTER — TELEPHONE (OUTPATIENT)
Dept: OBGYN CLINIC | Facility: CLINIC | Age: 35
End: 2020-04-24

## 2020-04-27 ENCOUNTER — TELEPHONE (OUTPATIENT)
Dept: OBGYN CLINIC | Facility: CLINIC | Age: 35
End: 2020-04-27

## 2020-04-27 ENCOUNTER — OFFICE VISIT (OUTPATIENT)
Dept: FAMILY MEDICINE CLINIC | Facility: CLINIC | Age: 35
End: 2020-04-27
Payer: COMMERCIAL

## 2020-04-27 VITALS
TEMPERATURE: 96.6 F | HEART RATE: 84 BPM | DIASTOLIC BLOOD PRESSURE: 90 MMHG | OXYGEN SATURATION: 97 % | SYSTOLIC BLOOD PRESSURE: 136 MMHG

## 2020-04-27 DIAGNOSIS — N64.4 PAINFUL BREASTS: Primary | ICD-10-CM

## 2020-04-27 PROCEDURE — 1036F TOBACCO NON-USER: CPT | Performed by: NURSE PRACTITIONER

## 2020-04-27 PROCEDURE — 1111F DSCHRG MED/CURRENT MED MERGE: CPT | Performed by: NURSE PRACTITIONER

## 2020-04-27 PROCEDURE — 99214 OFFICE O/P EST MOD 30 MIN: CPT | Performed by: NURSE PRACTITIONER

## 2020-04-29 ENCOUNTER — TELEPHONE (OUTPATIENT)
Dept: POSTPARTUM | Facility: CLINIC | Age: 35
End: 2020-04-29

## 2020-04-29 PROBLEM — O13.9 GESTATIONAL HYPERTENSION: Status: RESOLVED | Noted: 2020-04-20 | Resolved: 2020-04-29

## 2020-05-05 PROBLEM — N64.4 PAINFUL BREASTS: Status: ACTIVE | Noted: 2020-05-05

## 2020-08-03 ENCOUNTER — TELEPHONE (OUTPATIENT)
Dept: OBGYN CLINIC | Facility: CLINIC | Age: 35
End: 2020-08-03

## 2020-08-03 ENCOUNTER — POSTPARTUM VISIT (OUTPATIENT)
Dept: OBGYN CLINIC | Age: 35
End: 2020-08-03

## 2020-08-03 VITALS — TEMPERATURE: 96.9 F | WEIGHT: 209.2 LBS | BODY MASS INDEX: 37.06 KG/M2

## 2020-08-03 DIAGNOSIS — N92.0 MENORRHAGIA WITH REGULAR CYCLE: ICD-10-CM

## 2020-08-03 PROCEDURE — 99024 POSTOP FOLLOW-UP VISIT: CPT | Performed by: NURSE PRACTITIONER

## 2020-08-03 NOTE — TELEPHONE ENCOUNTER
I returned pt call  Pt stated she feels like her tyroid is off and never had a PP visit  Pt stated she feels more emotional with up and down feeling  Possible PPD   I scheduled pt with de today in [facility] for 2:40 pm

## 2020-08-03 NOTE — PATIENT INSTRUCTIONS
Postpartum Depression   WHAT YOU NEED TO KNOW:   What is postpartum depression? Postpartum depression is a mood disorder that occurs after giving birth  A mood is an emotion or a feeling  Moods affect your behavior and how you feel about yourself and life in general  Depression is a sad mood that you cannot control  Women often feel sad, afraid, or nervous after their baby is born  These feelings are called postpartum blues or baby blues, and they usually go away in 1 to 2 weeks  With postpartum depression, these symptoms get worse and continue for more than 2 weeks  Postpartum depression is a serious condition that affects your daily activities and relationships  What causes postpartum depression? Healthcare providers do not know exactly what causes postpartum depression  It may be caused by a sudden drop in hormone levels after childbirth  A previous episode of postpartum depression or a family history of depression may increase your risk  Several things may trigger postpartum depression:  · Lack of support from the baby's father or other family members    · Feeling more tired than usual    · Stress, a poor diet, or lack of sleep    · Pain after childbirth or pain during breastfeeding    · Sudden change in lifestyle  How is postpartum depression diagnosed? Postpartum depression affects your daily activities and your relationships with other people  Healthcare providers will ask you questions about your signs and symptoms and how they are affecting your life  The symptoms of postpartum depression usually begin within 1 month after childbirth  You feel depressed or lose interest in activities you enjoy nearly every day for at least 2 weeks  You also have 4 or more of the following symptoms:  · You feel tired or have less energy than usual      · You feel unimportant or guilty most of the time  · You think about hurting or killing yourself  · Your appetite changes   You may lose your appetite and lose weight without trying  Your appetite may also increase and you may gain weight  · You are restless, irritable, or withdrawn  · You have trouble concentrating and remembering things  You have trouble doing daily tasks or making decisions  · You have trouble sleeping, even after the baby is asleep  How is postpartum depression treated? · Psychotherapy:  During therapy, you will talk with healthcare providers about how to cope with your feelings and moods  This can be done alone or in a group  It may also be done with family members or your partner  · Antidepressants: This medicine is given to decrease or stop the symptoms of depression  You usually need to take antidepressants for several weeks before you begin to feel better  Do not stop taking antidepressants unless your healthcare provider tells you to  Healthcare providers may try a different antidepressant if one type does not work  What can I do to feel better? · Rest:  Do not try to do everything all at the same time  Do only what is needed and let other things wait until later  Ask your family or friends for help, especially if you have other children  Ask your partner to help with night feedings or other baby care  Try to sleep when the baby naps  · Get emotional support:  Share your feelings with your partner, a friend, or another mother  · Take care of yourself:  Shower and dress each day  Do not skip meals  Try to get out of the house a little each day  Get regular exercise  Eat a healthy diet  Avoid alcohol because it can make your depression worse  Do not isolate yourself  Go for a walk or meet with a friend  It is also important that you have some time by yourself each day  How do I find support and more information?    · [address] [place] [address], Public Information & Communication Branch  [address] [place] [address] W, [address] [facility], [address]  Elsi Ballesteros [place] [id number]   Phone: [phone]  Phone: [phone]  Web Address: Earle chase  When should I contact my healthcare provider? · You cannot make it to your next visit  · Your depression does not get better with treatment or it gets worse  · You have questions or concerns about your condition or care  When should I seek immediate care or call 911? · You think about hurting or killing yourself, your baby, or someone else  · You feel like other people want to hurt you  · You hear voices telling you to hurt yourself or your baby  CARE AGREEMENT:   You have the right to help plan your care  Learn about your health condition and how it may be treated  Discuss treatment options with your caregivers to decide what care you want to receive  You always have the right to refuse treatment  The above information is an  only  It is not intended as medical advice for individual conditions or treatments  Talk to your doctor, nurse or pharmacist before following any medical regimen to see if it is safe and effective for you  © 2017 [address] [employer] Information is for End User's use only and may not be sold, redistributed or otherwise used for commercial purposes  All illustrations and images included in CareNotes® are the copyrighted property of [employer]  or Henri Fuller

## 2020-08-03 NOTE — TELEPHONE ENCOUNTER
Patient would like bloodwork done nor feeling right delivered baby in April never came in for postpartum   Patient is scheduled for appointment end of August

## 2020-08-03 NOTE — PROGRESS NOTES
Diagnoses and all orders for this visit:    1  Post partum depression  -     TSH, 3rd generation with Free T4 reflex; Future  -     Vitamin D 25 hydroxy; Future  -     CBC and differential; Future  -     sertraline (ZOLOFT) 50 mg tablet; Take 1/2 tablet for 1 week, then 1 tablet daily  Patient requested certain labs to be assessed  Will treat based on results  Message also sent to Yasmin Bauman for further assessment  Reviewed that she maybe referred straight to behavioral Health due to amount of months postpartum, will let Linda Lugoheldershayna make that decision  She declines a F/U at this time with  She is ok with starting meds, so instructed to schedule F/U if she feels meds are not helping after a few weeks of taking, she agrees  2  Menorrhagia with regular cycle  -     US pelvis complete w transvaginal; Future  -     TSH, 3rd generation with Free T4 reflex; Future  -     CBC and differential; Future  All questions and concerns answered  Pleasant 38 y o  premenopausal female here for postpartum exam  She delivered a baby   via vaginal delivery on 4/20/20  She suffered a 1st degree laceration with repair  She is exclusively bottle feeding  She denies S/S of mastitis  She reports now having heavy periods with small clots and now more cramping  She described passing two large softball sized clots before actually giving birth to her daughter and even the doctors noted concern  She did not need blood, no postpartum hemorrhage noted  She reports no difficulty urinating and regular bowel movements  She has not resumed intercourse  Not interested in hormonal birth control  She was actually scheduled for today's appointment due to anxiety and depression symptoms, being very emotional and tearful for unknown reasons  Her [place] PPDS "13"  She denies S I and harm to others ans knows she wants to starts medication ans is open to therapy        Denies F/C/N/V     GDM during pregnancy:  Y/N will need 2 hour GTT     Past Medical History:   Diagnosis Date    Abdominal hernia     Stress incontinence     Varicella     as child      Past Surgical History:   Procedure Laterality Date    COLPOSCOPY  2002    GYNECOLOGIC CRYOSURGERY  2002     Family History   Problem Relation Age of Onset    Asthma Mother     Hypertension Father     Hyperlipidemia Father     Colon cancer Maternal Uncle     No Known Problems Sister     No Known Problems Brother     No Known Problems Son     Heart disease Maternal Grandmother     Alzheimer's disease Maternal Grandfather     No Known Problems Sister      Social History     Tobacco Use    Smoking status: Never Smoker    Smokeless tobacco: Never Used   Substance Use Topics    Alcohol use: Not Currently     Frequency: 4 or more times a week     Drinks per session: 1 or 2     Binge frequency: Never    Drug use: Never       Current Outpatient Medications:     Prenatal Multivit-Min-Fe-FA (PRENATAL VITAMINS PO), Take by mouth, Disp: , Rfl:     sertraline (ZOLOFT) 50 mg tablet, Take 1/2 tablet for 1 week, then 1 tablet daily  , Disp: 30 tablet, Rfl: 2  Patient Active Problem List    Diagnosis Date Noted    Post partum depression 2020    Menorrhagia with regular cycle 2020    Painful breasts 2020    Lactating mother 2020     (spontaneous vaginal delivery) 2020    39 weeks gestation of pregnancy 2020    Obesity complicating pregnancy     Uterine fibroid in pregnancy     Umbilical hernia without obstruction and without gangrene 2019       Allergies   Allergen Reactions    Erythromycin Rash       OB History    Para Term  AB Living   3 2 2 0 1 2   SAB TAB Ectopic Multiple Live Births   1 0 0 0 2      # Outcome Date GA Lbr Crispin/2nd Weight Sex Delivery Anes PTL Lv   3 Term 20 39w5d / 00:13 3115 g (6 lb 13 9 oz) F Vag-Spont EPI N KEDAR   2 Term 06/12/15 39w5d  3402 g (7 lb 8 oz) M Vag-Spont   KEDAR Birth Comments: forceps after 24+hours    1 SAB 2011 5w0d             Obstetric Comments   1 miscarriage 2011       Vitals:    08/03/20 1449   Temp: (!) 96 9 °F (36 1 °C)   Weight: 94 9 kg (209 lb 3 2 oz)     Body mass index is 37 06 kg/m²  Review of Systems   Constitutional: Negative for chills, fatigue, fever and unexpected weight change  Respiratory: Negative for shortness of breath  Gastrointestinal: Negative for anal bleeding, blood in stool, constipation and diarrhea  Genitourinary: Negative for difficulty urinating, dysuria and hematuria  Physical Exam  Genitourinary:      Cervix, uterus and rectum normal       Vaginal bleeding (easily friable during exam) present  Vitals signs and nursing note reviewed  Physical Exam   Constitutional: She appears well-developed and well-nourished  No distress  Head: Normocephalic  Neck: Normal range of motion  Neck supple  Pulmonary: Effort normal   Cardiac: S1 & S2, regular rate & rhythm  Abdominal: Soft  Non-tender, C/S scar No  Pelvic exam was performed    See Diagram

## 2020-08-07 ENCOUNTER — TELEMEDICINE (OUTPATIENT)
Dept: BEHAVIORAL/MENTAL HEALTH CLINIC | Facility: CLINIC | Age: 35
End: 2020-08-07
Payer: COMMERCIAL

## 2020-08-07 PROCEDURE — 90832 PSYTX W PT 30 MINUTES: CPT | Performed by: SOCIAL WORKER

## 2020-08-07 NOTE — PSYCH
Virtual Regular Visit Start Time 2:00PM-2:45PM       Assessment/Plan:  Therapy for Post Partum Depression     Problem List Items Addressed This Visit     None               Reason for visit is therapy    No chief complaint on file  Encounter provider Carroll Holley    Provider located at [address] [place] [id number] [phone]      Recent Visits  No visits were found meeting these conditions  Showing recent visits within past 7 days and meeting all other requirements     Future Appointments  No visits were found meeting these conditions  Showing future appointments within next 150 days and meeting all other requirements        The patient was identified by name and date of birth  Kaylen Herzog was informed that this is a telemedicine visit and that the visit is being conducted through [employer] [facility] [employer] and patient was informed that this is not a secure, HIPAA-complaint platform  She agrees to proceed     My office door was closed  No one else was in the room  She acknowledged consent and understanding of privacy and security of the video platform  The patient has agreed to participate and understands they can discontinue the visit at any time  Patient is aware this is a billable service  Dee Morocho is a 38 y o  female who had a baby in April  She states that the baby is really good and has started sleeping through the night  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 teach  When asked how much longer she has to attend, she shared that she has taken 7 classes and she has about 5 years to go  They were paying for 75% and now the school is absorbing 100% of her education  She has a 11year old son in addition to her 4 month old daughter    She has not started taking the Zoloft yet as she states she got her period and feels last week she was a bit overwhelmed  She is feeling that her feelings are related to hormones  She agrees to start  taking the medication in the event she is not feeling better emotionally after her period subsides which she states is extra heavy and she has cramps  She was talkative, and her appearance is appropriate, well groomed and overweight  She states that she lost 30lbs while she was pregnant but gained is starting to gain it back,  Her thought process is logical with coherent speech  Her insight is fair and her judgment is fair  She reports being overwhelmed with the kids and school and feels that her period coming on last week made her emotional and overwhelmed  She has a hernia from her first pregnancy that she would also like to have fixed  She shared that she is feeling much better and will call in the future if she feels the need  No thoughts to harm self or others  Validation of feelings  HPI     Past Medical History:   Diagnosis Date    Abdominal hernia     Stress incontinence     Varicella     as child        Past Surgical History:   Procedure Laterality Date    COLPOSCOPY  2002    GYNECOLOGIC CRYOSURGERY         Current Outpatient Medications   Medication Sig Dispense Refill    Prenatal Multivit-Min-Fe-FA (PRENATAL VITAMINS PO) Take by mouth      sertraline (ZOLOFT) 50 mg tablet Take 1/2 tablet for 1 week, then 1 tablet daily  30 tablet 2     No current facility-administered medications for this visit  Allergies   Allergen Reactions    Erythromycin Rash       Review of Systems  Brooke Lundberg has not started the medication  Video Exam    There were no vitals filed for this visit      Physical Exam Mental Health: Brooke Lundberg denies any SI/HI or AH/VH    I spent 30 minutes directly with the patient during this visit      Carlos Olivas acknowledges that she has consented to an online visit or consultation  She understands that the online visit is based solely on information provided by her, and that, in the absence of a face-to-face physical evaluation by the physician, the diagnosis she receives is both limited and provisional in terms of accuracy and completeness  This is not intended to replace a full medical face-to-face evaluation by the physician  Rosemarie Valladares understands and accepts these terms

## 2020-08-11 ENCOUNTER — APPOINTMENT (OUTPATIENT)
Dept: LAB | Facility: MEDICAL CENTER | Age: 35
End: 2020-08-11
Payer: COMMERCIAL

## 2020-08-11 ENCOUNTER — HOSPITAL ENCOUNTER (OUTPATIENT)
Dept: RADIOLOGY | Facility: MEDICAL CENTER | Age: 35
Discharge: HOME | End: 2020-08-11
Payer: COMMERCIAL

## 2020-08-11 DIAGNOSIS — N92.0 MENORRHAGIA WITH REGULAR CYCLE: ICD-10-CM

## 2020-08-11 LAB
25(OH)D3 SERPL-MCNC: 34.9 NG/ML (ref 30–100)
BASOPHILS # BLD AUTO: 0.05 THOUSANDS/ΜL (ref 0–0.1)
BASOPHILS NFR BLD AUTO: 1 % (ref 0–1)
EOSINOPHIL # BLD AUTO: 0.22 THOUSAND/ΜL (ref 0–0.61)
EOSINOPHIL NFR BLD AUTO: 3 % (ref 0–6)
ERYTHROCYTE [DISTWIDTH] IN BLOOD BY AUTOMATED COUNT: 13.8 % (ref 11.6–15.1)
HCT VFR BLD AUTO: 41.5 % (ref 34.8–46.1)
HGB BLD-MCNC: 13.7 G/DL (ref 11.5–15.4)
IMM GRANULOCYTES # BLD AUTO: 0.03 THOUSAND/UL (ref 0–0.2)
IMM GRANULOCYTES NFR BLD AUTO: 0 % (ref 0–2)
LYMPHOCYTES # BLD AUTO: 1.38 THOUSANDS/ΜL (ref 0.6–4.47)
LYMPHOCYTES NFR BLD AUTO: 21 % (ref 14–44)
MCH RBC QN AUTO: 29.9 PG (ref 26.8–34.3)
MCHC RBC AUTO-ENTMCNC: 33 G/DL (ref 31.4–37.4)
MCV RBC AUTO: 91 FL (ref 82–98)
MONOCYTES # BLD AUTO: 0.51 THOUSAND/ΜL (ref 0.17–1.22)
MONOCYTES NFR BLD AUTO: 8 % (ref 4–12)
NEUTROPHILS # BLD AUTO: 4.55 THOUSANDS/ΜL (ref 1.85–7.62)
NEUTS SEG NFR BLD AUTO: 67 % (ref 43–75)
NRBC BLD AUTO-RTO: 0 /100 WBCS
PLATELET # BLD AUTO: 314 THOUSANDS/UL (ref 149–390)
PMV BLD AUTO: 10.3 FL (ref 8.9–12.7)
RBC # BLD AUTO: 4.58 MILLION/UL (ref 3.81–5.12)
T4 FREE SERPL-MCNC: 0.93 NG/DL (ref 0.76–1.46)
TSH SERPL DL<=0.05 MIU/L-ACNC: 4.6 UIU/ML (ref 0.36–3.74)
WBC # BLD AUTO: 6.74 THOUSAND/UL (ref 4.31–10.16)

## 2020-08-11 PROCEDURE — 82306 VITAMIN D 25 HYDROXY: CPT

## 2020-08-11 PROCEDURE — 84443 ASSAY THYROID STIM HORMONE: CPT

## 2020-08-11 PROCEDURE — 76830 TRANSVAGINAL US NON-OB: CPT

## 2020-08-11 PROCEDURE — 36415 COLL VENOUS BLD VENIPUNCTURE: CPT

## 2020-08-11 PROCEDURE — 85025 COMPLETE CBC W/AUTO DIFF WBC: CPT

## 2020-08-11 PROCEDURE — 84439 ASSAY OF FREE THYROXINE: CPT

## 2020-08-11 PROCEDURE — 76856 US EXAM PELVIC COMPLETE: CPT

## 2020-08-17 DIAGNOSIS — R79.89 ABNORMAL THYROID STIMULATING HORMONE LEVEL: Primary | ICD-10-CM

## 2020-08-17 RX ORDER — LEVOTHYROXINE SODIUM 0.03 MG/1
25 TABLET ORAL DAILY
Qty: 30 TABLET | Refills: 2 | Status: SHIPPED | OUTPATIENT
Start: 2020-08-17 | End: 2022-02-01 | Stop reason: ALTCHOICE

## 2020-08-18 ENCOUNTER — TELEPHONE (OUTPATIENT)
Dept: OBGYN CLINIC | Facility: CLINIC | Age: 35
End: 2020-08-18

## 2020-08-18 NOTE — TELEPHONE ENCOUNTER
----- Message from Nohemy Saleem sent at 8/17/2020 12:33 PM EDT -----  I called pt and left detailed message as her communication form of her elevated TSH level  Rx sent for levothyroxine to her pharmacy and ENDO referral   She is symptomatic with heavy bleeding and PPD so will start treatment right away  Also on Zoloft and doing Therapy  Also reviewed her US results with her for signs of adenomyosis which can be also controlled with birth control  Nursing Staff: So will give an option to start thyroid tx first and see if period improve with that alone  Or can also start birth control as well  If she wants to wait and monitor, then can RTO in 3 months for a birth control consult if periods do not improve to discuss [facility] options  Please help her with referral for endocrinology in case she desires a certain location  Thanks!

## 2020-08-19 ENCOUNTER — TELEPHONE (OUTPATIENT)
Dept: OBGYN CLINIC | Facility: CLINIC | Age: 35
End: 2020-08-19

## 2020-08-19 NOTE — TELEPHONE ENCOUNTER
Called pt to cancel and r/s her annual exam to 3 months past her post partum visit which was 8/3/20  Pt states no one has called her regarding lab work and/or U/S results she had on 8/11/20  Can we please reach out to Ms Stacy to discuss? Please advise

## 2020-08-19 NOTE — TELEPHONE ENCOUNTER
ISHMAEL Oleary   8/17/2020 12:35 PM       I call pt and left detailed message about her results, see TSH labs results note   Adenomyosis and option for [facility] and started on levothyroxine TX with endo referral         Jared Jerez, [address]   8/17/2020 12:33 PM       I called pt and left detailed message as her communication form of her elevated TSH level   Rx sent for levothyroxine to her pharmacy and ENDO referral  Nakul Anderson is symptomatic with heavy bleeding and PPD so will start treatment right away   Also on Zoloft and doing Therapy  Also reviewed her US results with her for signs of adenomyosis which can be also controlled with birth control   Nursing Staff: So will give an option to start thyroid tx first and see if period improve with that alone  Or can also start birth control as well   If she wants to wait and monitor, then can RTO in 3 months for a birth control consult if periods do not improve to discuss [facility] options  Please help her with referral for endocrinology in case she desires a certain location    Thanks! Pt advised as directed on providers note  Pt stated she received voicemail that stated same information I advised  but maybe she missed heard she thought something was said about cells and she wanted more information on the cell  Pt stated she will re listen to the voicemail and get back to us if she has questions

## 2020-09-06 ENCOUNTER — HOSPITAL ENCOUNTER (EMERGENCY)
Facility: HOSPITAL | Age: 35
Discharge: HOME | End: 2020-09-06
Attending: EMERGENCY MEDICINE
Payer: COMMERCIAL

## 2020-09-06 ENCOUNTER — APPOINTMENT (EMERGENCY)
Dept: CT IMAGING | Facility: HOSPITAL | Age: 35
End: 2020-09-06
Payer: COMMERCIAL

## 2020-09-06 VITALS
BODY MASS INDEX: 38.23 KG/M2 | HEART RATE: 69 BPM | RESPIRATION RATE: 18 BRPM | DIASTOLIC BLOOD PRESSURE: 80 MMHG | OXYGEN SATURATION: 98 % | SYSTOLIC BLOOD PRESSURE: 138 MMHG | WEIGHT: 215.83 LBS | TEMPERATURE: 97.6 F

## 2020-09-06 DIAGNOSIS — S32.009A LUMBAR TRANSVERSE PROCESS FRACTURE (HCC): Primary | ICD-10-CM

## 2020-09-06 LAB
EXT PREG TEST URINE: NEGATIVE
EXT. CONTROL ED NAV: NORMAL

## 2020-09-06 PROCEDURE — G1004 CDSM NDSC: HCPCS

## 2020-09-06 PROCEDURE — 99283 EMERGENCY DEPT VISIT LOW MDM: CPT | Performed by: EMERGENCY MEDICINE

## 2020-09-06 PROCEDURE — 99284 EMERGENCY DEPT VISIT MOD MDM: CPT

## 2020-09-06 PROCEDURE — 72131 CT LUMBAR SPINE W/O DYE: CPT

## 2020-09-06 PROCEDURE — 81025 URINE PREGNANCY TEST: CPT | Performed by: EMERGENCY MEDICINE

## 2020-09-06 RX ORDER — OXYCODONE HYDROCHLORIDE AND ACETAMINOPHEN 5; 325 MG/1; MG/1
1 TABLET ORAL EVERY 6 HOURS PRN
Qty: 30 TABLET | Refills: 0 | Status: SHIPPED | OUTPATIENT
Start: 2020-09-06 | End: 2022-02-01 | Stop reason: ALTCHOICE

## 2020-09-06 NOTE — ED PROVIDER NOTES
History  Chief Complaint   Patient presents with    Back Injury     Pt reports slipping in her attic, fell, and landed on a wood beam  Reports left back pain  [place] LOC, hitting head, or BTs  Fall happened thursday  HPI patient is a 79-year-old female, reports that Thursday she went up into her attic apparently slipped going through the ceiling with her left leg and injuring her left back against a BeaM  Patient reports back pain primarily lumbar spine and paraspinal on the left side of the lumbar vertebrae  Patient reports history of low back problems and reports that she sees a chiropractor  Patient reports the pain has been persistently worse since Thursday  She reports pain with bending and with moving  She denies any pain radiating down her legs  She denies any numbness or weakness  The patient can stand comfortably  She complains of pain with range of motion moving forward  She denies any other trauma  She denies any anterior abdominal pain  She denies any weakness or lightheadedness  Denies any injury to her chest head neck or upper extremities  Past medical history postpartum 4 months, abdominal hernia, stress incontinence line family history noncontributory  Social history, nonsmoker no history of drug abuse    Prior to Admission Medications   Prescriptions Last Dose Informant Patient Reported? Taking? Prenatal Multivit-Min-Fe-FA (PRENATAL VITAMINS PO)  Self Yes No   Sig: Take by mouth   levothyroxine 25 mcg tablet   No No   Sig: Take 1 tablet (25 mcg total) by mouth daily   sertraline (ZOLOFT) 50 mg tablet   No No   Sig: Take 1/2 tablet for 1 week, then 1 tablet daily        Facility-Administered Medications: None       Past Medical History:   Diagnosis Date    Abdominal hernia     Stress incontinence     Varicella     as child        Past Surgical History:   Procedure Laterality Date    COLPOSCOPY  2002    GYNECOLOGIC CRYOSURGERY  2002       Family History   Problem Relation Age of Onset    Asthma Mother     Hypertension Father     Hyperlipidemia Father     Colon cancer Maternal Uncle     No Known Problems Sister     No Known Problems Brother     No Known Problems Son     Heart disease Maternal Grandmother     Alzheimer's disease Maternal Grandfather     No Known Problems Sister      I have reviewed and agree with the history as documented  E-Cigarette/Vaping     E-Cigarette/Vaping Substances    Nicotine No     THC No     CBD No     Flavoring No      Social History     Tobacco Use    Smoking status: Never Smoker    Smokeless tobacco: Never Used   Substance Use Topics    Alcohol use: Not Currently     Frequency: 4 or more times a week     Drinks per session: 1 or 2     Binge frequency: Never    Drug use: Never       Review of Systems   Constitutional: Negative for fever  HENT: Negative for congestion  Eyes: Negative for pain and redness  Respiratory: Negative for cough and shortness of breath  Cardiovascular: Negative for chest pain  Gastrointestinal: Negative for abdominal pain and vomiting  Musculoskeletal: Positive for back pain  Negative for gait problem  Back injury  Pain over her right flank    Physical Exam  Physical Exam  Vitals signs and nursing note reviewed  Constitutional:       Appearance: She is well-developed  HENT:      Head: Normocephalic  Right Ear: External ear normal       Left Ear: External ear normal       Nose: Nose normal    Eyes:      General: Lids are normal       Pupils: Pupils are equal, round, and reactive to light  Neck:      Musculoskeletal: Normal range of motion and neck supple  Pulmonary:      Effort: Pulmonary effort is normal  No respiratory distress  Abdominal:      General: Abdomen is flat  Bowel sounds are normal       Tenderness: There is no abdominal tenderness  Musculoskeletal: Normal range of motion  General: No deformity  Comments:  There is midline lumbar spine pain there is paraspinal pain along the transverse processes of her vertebrae in the left side of her lumbar spine, negative left-sided straight leg raise, no focal weakness, patient can ambulate without weakness  Good distal pulses and sensation   Skin:     General: Skin is warm and dry  Neurological:      Mental Status: She is alert and oriented to person, place, and time  Pulse oximetry normal at 98% adequate oxygenation, there is no hypoxia    Vital Signs  ED Triage Vitals [09/06/20 1137]   Temperature Pulse Respirations Blood Pressure SpO2   97 6 °F (36 4 °C) 69 18 138/80 98 %      Temp Source Heart Rate Source Patient Position - Orthostatic VS BP Location FiO2 (%)   Oral Monitor -- Right arm --      Pain Score       --           Vitals:    09/06/20 1137   BP: 138/80   Pulse: 69         Visual Acuity  Visual Acuity      Most Recent Value   L Pupil Size (mm)  3   R Pupil Size (mm)  3          ED Medications  Medications - No data to display    Diagnostic Studies  Results Reviewed     Procedure Component Value Units Date/Time    POCT pregnancy, urine [[id number]]  (Normal) Resulted:  09/06/20 1150    Lab Status:  Final result Updated:  09/06/20 1151     EXT PREG TEST UR (Ref: Negative) Negative     Control Valid                 CT spine lumbar without contrast   Final Result by Christina Jewell MD (09/06 1314)      Acute minimally displaced fractures of the left L2 and L3 transverse processes with mild surrounding soft tissue inflammatory change  Questionable nondisplaced acute left L1 transverse process fracture  Mild multilevel degenerative discogenic disease as described above  No significant spinal canal or foraminal stenosis  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  0 Filed at: 09/06/2020 1148   2  How many drinks containing alcohol do you have on a typical day you are drinking? 0 Filed at: 09/06/2020 1148   3a  Male UNDER 65: How often do you have five or more drinks on one occasion? 0 Filed at: 09/06/2020 1148   3b  FEMALE Any Age, or MALE 65+: How often do you have 4 or more drinks on one occassion? 0 Filed at: 09/06/2020 1148   Audit-C Score  0 Filed at: 09/06/2020 1148                  CLIF/DAST-10      Most Recent Value   How many times in the past year have you    Used an illegal drug or used a prescription medication for non-medical reasons? Never Filed at: 09/06/2020 1148             reviewed with patient, she denies pregnancy but will need to check before imaging, she understands  Pregnancy test was negative  Discussed with patient can x-ray but patient does have pain in what what appears to be her transverse processes, best seen on CT scan  Patient agreed to CT scanning as better study  CT scan of the lumbar spine showed acute minimally displaced fractures of L2-L3 and possibly L1  Reviewed the patient gave her copy of the report  Discussed orthopedic follow-up discussed pain control  MDM medical decision making 56-year-old female status post fall through a ceiling catching herself on Thursday, complaining of pain in her left side, CT consistent with transverse process fractures  Discussed outpatient follow-up discussed analgesics discussed indications to return  Disposition  Final diagnoses:   Lumbar transverse process fracture ([address] ) - L1-2 3     Time reflects when diagnosis was documented in both MDM as applicable and the Disposition within this note     Time User Action Codes Description Comment    9/6/2020  1:47 PM Yan Arnold Add [S12  9XXA] Cervical transverse process fracture ([address] )     9/6/2020  1:47 PM [place]o [place]  9XXA] Cervical transverse process fracture ([facility] [address] [facility] )     9/6/2020  1:47 PM Maco Ori Add [S32 009A] Lumbar transverse process fracture ([facility] [address] [facility] )     9/6/2020  1:47 PM Maco Ori Modify [S32 009A] Lumbar transverse process fracture [facility]) L1-2 3      ED Disposition     ED Disposition Condition Date/Time Comment    Discharge Stable Sun Sep 6, 2020  1:45 PM Álvaro Brown discharge to home/self care  Follow-up Information     Follow up With Specialties Details Why [address], MD Orthopedic Surgery   [address] [address]  [phone]            Patient's Medications   Discharge Prescriptions    OXYCODONE-ACETAMINOPHEN (PERCOCET) 5-325 MG PER TABLET    Take 1 tablet by mouth every 6 (six) hours as needed for severe pain for up to 30 dosesMax Daily Amount: 4 tablets       Start Date: 9/6/2020  End Date: --       Order Dose: 1 tablet       Quantity: 30 tablet    Refills: 0     No discharge procedures on file      PDMP Review       Value Time User    PDMP Reviewed  Yes 4/22/2020  8:58 AM Valerie Cortez MD          ED Provider  Electronically Signed by           Lina Samayoa MD  09/06/20

## 2020-09-06 NOTE — DISCHARGE INSTRUCTIONS
Rest  Ice to area of soreness  Percocet 1 every 6 hours if needed for pain  Follow-up with Orthopedics

## 2020-09-09 ENCOUNTER — VBI (OUTPATIENT)
Dept: ADMINISTRATIVE | Facility: OTHER | Age: 35
End: 2020-09-09

## 2020-09-09 NOTE — TELEPHONE ENCOUNTER
Gabo Du    ED Visit Information     Ed visit date: 9/6/20   Diagnosis Description: Lumbar transverse process fracture    In Network? Yes [facility]  Discharge status: Home  Discharged with meds ? Yes  Number of ED visits to date: 1  ED Severity:3     Outreach Information    Outreach successful: Yes 1  Date letter mailed:0  Date Finalized:9/9/20    Care Coordination    Follow up appointment with specialilty: no will call   Transportation issues ? No    Value [employer] type:  3 Day Outreach  Emergent necessity warranted by diagnosis:  Yes  [facility] PCP:  Yes  Transportation:  Self Transport  Called PCP first?:  No  Feels able to call PCP for urgent problems ?:  Yes  Understands what emergencies can be handled by PCP ?:  Yes  Ever any problems getting appointment with PCP for minor emergency/urgency problems?:  No  Practice Contacted Patient ?:  No  Pt had ED follow up with pcp/staff ?:  No    Reason Patient went to ED instead of Urgent Care or PCP?:  Perceived Severity of Illness  Urgent care Education?:  No  There was a Personal communication with Patient regarding recent ED visit on 9/6/20  Patient stated that she is still in pain, Patient declined a follow up with PCP, she will e following up with the Ortho  Patient is aware of PCP after hour's on-call service, Patient is aware of their nearest [place] [address] urgent care facility, education not given    Patient does not meet OPCM criteria

## 2020-10-05 ENCOUNTER — OFFICE VISIT (OUTPATIENT)
Dept: OBGYN CLINIC | Facility: HOSPITAL | Age: 35
End: 2020-10-05
Payer: COMMERCIAL

## 2020-10-05 VITALS
DIASTOLIC BLOOD PRESSURE: 81 MMHG | SYSTOLIC BLOOD PRESSURE: 124 MMHG | HEIGHT: 63 IN | BODY MASS INDEX: 38.62 KG/M2 | HEART RATE: 89 BPM | WEIGHT: 218 LBS

## 2020-10-05 DIAGNOSIS — M54.41 ACUTE BACK PAIN WITH SCIATICA, RIGHT: ICD-10-CM

## 2020-10-05 DIAGNOSIS — M51.36 DDD (DEGENERATIVE DISC DISEASE), LUMBAR: ICD-10-CM

## 2020-10-05 DIAGNOSIS — S32.009A LUMBAR TRANSVERSE PROCESS FRACTURE, CLOSED, INITIAL ENCOUNTER (HCC): Primary | ICD-10-CM

## 2020-10-05 PROCEDURE — 1036F TOBACCO NON-USER: CPT | Performed by: ORTHOPAEDIC SURGERY

## 2020-10-05 PROCEDURE — 99203 OFFICE O/P NEW LOW 30 MIN: CPT | Performed by: ORTHOPAEDIC SURGERY

## 2021-01-02 ENCOUNTER — NURSE TRIAGE (OUTPATIENT)
Dept: OTHER | Facility: OTHER | Age: 36
End: 2021-01-02

## 2021-01-02 DIAGNOSIS — U07.1 COVID-19 DETERMINED BY CLINICAL DIAGNOSTIC CRITERIA: Primary | ICD-10-CM

## 2021-01-03 NOTE — TELEPHONE ENCOUNTER
Regarding: Covid, Asymptomatic, Exposure 2 of 4  ----- Message from Luke Sesay sent at 1/2/2021  5:04 PM EST -----  "We just had a family member taken to the hospital and they tested positive for covid  She isn't having any symptoms at this time   She'd like to be tested "

## 2021-01-03 NOTE — TELEPHONE ENCOUNTER
Reason for Disposition   [1] CLOSE CONTACT COVID-19 EXPOSURE within last 14 days AND [2] NO symptoms    Answer Assessment - Initial Assessment Questions  1  COVID-19 CLOSE CONTACT: "Who is the person with the confirmed or suspected COVID-19 infection that you were exposed to?"      Father-in-law  2  PLACE of CONTACT: "Where were you when you were exposed to COVID-19?" (e g , home, school, medical waiting room; which city?)      home  3  TYPE of CONTACT: "How much contact was there?" (e g , sitting next to, live in same house, work in same office, same building)     Both patient and father-in-law house  4  DURATION of CONTACT: "How long were you in contact with the COVID-19 patient?" (e g , a few seconds, passed by person, a few minutes, 15 minutes or longer, live with the patient)      Several hours  5  MASK: "Were you wearing a mask?" "Was the other person wearing a mask?" Note: wearing a mask reduces the risk of an   otherwise close contact  unmasked  6  DATE of CONTACT: "When did you have contact with a COVID-19 patient?" (e g , how many days ago)      12/31  7  COMMUNITY SPREAD: "Are there lots of cases of COVID-19 (community spread) where you live?" (See public health department website, if unsure)       no  8  SYMPTOMS: "Do you have any symptoms?" (e g , fever, cough, breathing difficulty, loss of taste or smell)      no  9  PREGNANCY OR POSTPARTUM: "Is there any chance you are pregnant?" "When was your last menstrual period?" "Did you deliver in the last 2 weeks?"      No - LMP - current  10  HIGH RISK: "Do you have any heart or lung problems? Do you have a weak immune system?" (e g , heart failure, COPD, asthma, HIV positive, chemotherapy, renal failure, diabetes mellitus, sickle cell anemia, obesity)       no  11    TRAVEL: "Have you traveled out of the country recently?" If so, "When and where?"  Also ask about out-of-state travel, since the [facility] has identified some high-risk cities for community spread in the [address]  Note: Travel becomes less relevant if there is widespread community transmission where the patient lives        no    Protocols used: CORONAVIRUS (COVID-19) EXPOSURE-ADULT-AH

## 2021-01-04 DIAGNOSIS — U07.1 COVID-19 DETERMINED BY CLINICAL DIAGNOSTIC CRITERIA: ICD-10-CM

## 2021-01-04 PROCEDURE — U0003 INFECTIOUS AGENT DETECTION BY NUCLEIC ACID (DNA OR RNA); SEVERE ACUTE RESPIRATORY SYNDROME CORONAVIRUS 2 (SARS-COV-2) (CORONAVIRUS DISEASE [COVID-19]), AMPLIFIED PROBE TECHNIQUE, MAKING USE OF HIGH THROUGHPUT TECHNOLOGIES AS DESCRIBED BY CMS-2020-01-R: HCPCS | Performed by: NURSE PRACTITIONER

## 2021-01-05 LAB — SARS-COV-2 RNA SPEC QL NAA+PROBE: NOT DETECTED

## 2021-02-16 ENCOUNTER — TELEPHONE (OUTPATIENT)
Dept: OBGYN CLINIC | Age: 36
End: 2021-02-16

## 2021-02-16 NOTE — TELEPHONE ENCOUNTER
Pt called in regards to powder  Pt states that she believes it comes in liquid form now   Pt would like to discuss please advise

## 2021-02-16 NOTE — TELEPHONE ENCOUNTER
Pt was seen in August and had a rash under her breasts, and is wondering if she could get a cream or ointment for it, she said the powder that was given to her isn't working

## 2021-02-17 DIAGNOSIS — L29.9 ITCHING: Primary | ICD-10-CM

## 2021-02-17 RX ORDER — NYSTATIN 100000 U/G
OINTMENT TOPICAL 2 TIMES DAILY
Qty: 30 G | Refills: 2 | Status: SHIPPED | OUTPATIENT
Start: 2021-02-17 | End: 2022-02-01 | Stop reason: ALTCHOICE

## 2021-02-17 NOTE — TELEPHONE ENCOUNTER
No problem, I sent Rx for ointment for her  Hopefully this works better  You can let her know it was sent, thanks!

## 2021-04-09 DIAGNOSIS — Z23 ENCOUNTER FOR IMMUNIZATION: ICD-10-CM

## 2021-06-16 ENCOUNTER — TELEMEDICINE (OUTPATIENT)
Dept: FAMILY MEDICINE CLINIC | Facility: CLINIC | Age: 36
End: 2021-06-16
Payer: COMMERCIAL

## 2021-06-16 DIAGNOSIS — B00.2 RECURRENT ORAL HERPES SIMPLEX: Primary | ICD-10-CM

## 2021-06-16 DIAGNOSIS — L55.9 SUNBURN: ICD-10-CM

## 2021-06-16 PROCEDURE — 1036F TOBACCO NON-USER: CPT | Performed by: PHYSICIAN ASSISTANT

## 2021-06-16 PROCEDURE — 99213 OFFICE O/P EST LOW 20 MIN: CPT | Performed by: PHYSICIAN ASSISTANT

## 2021-06-16 RX ORDER — VALACYCLOVIR HYDROCHLORIDE 1 G/1
2000 TABLET, FILM COATED ORAL 2 TIMES DAILY
Qty: 4 TABLET | Refills: 0 | Status: SHIPPED | OUTPATIENT
Start: 2021-06-16 | End: 2022-02-01 | Stop reason: ALTCHOICE

## 2021-06-16 NOTE — PROGRESS NOTES
Virtual Regular Visit      Assessment/Plan:    Problem List Items Addressed This Visit     None      Visit Diagnoses     Recurrent oral herpes simplex    -  Primary    Relevant Medications    valACYclovir (VALTREX) 1,000 mg tablet    Sunburn          cover for oral herpes with valtrex x 1 day  Discussed supportive care for sunburnt lips  Cool compress, 100% aloe gel, oral antiinflammatories  Follow up if persisting or worsening         Reason for visit is   Chief Complaint   Patient presents with    Virtual Brief Visit    Sunburn    Mouth Lesions    Virtual Regular Visit        Encounter provider Neena Leslie PA-C    Provider located at [place] [address] [place] [id number]      Recent Visits  No visits were found meeting these conditions  Showing recent visits within past 7 days and meeting all other requirements  Today's Visits  Date Type Provider Dept   06/16/21 Telemedicine [facility]   Showing today's visits and meeting all other requirements  Future Appointments  No visits were found meeting these conditions  Showing future appointments within next 150 days and meeting all other requirements       The patient was identified by name and date of birth  Lucie Dior was informed that this is a telemedicine visit and that the visit is being conducted through [address] [place] [address] Now and patient was informed that this is a secure, HIPAA-compliant platform  She agrees to proceed     My office door was closed  No one else was in the room  She acknowledged consent and understanding of privacy and security of the video platform  The patient has agreed to participate and understands they can discontinue the visit at any time  Patient is aware this is a billable service  Marcela Zheng is a 39 y o  female    Pt presents with complaints of painful blisters on lower lip   Was burned by sun over the previous weekend, now today notes increased swelling, pain, blistering and cracking  She tried cool compress which burned her lips  She has no fevers  No facial/oral swelling  No trouble breathing or swallowing  She also has a hx of cold sores  Past Medical History:   Diagnosis Date    Abdominal hernia     Stress incontinence     Varicella     as child        Past Surgical History:   Procedure Laterality Date    COLPOSCOPY  2002    GYNECOLOGIC CRYOSURGERY  2002       Current Outpatient Medications   Medication Sig Dispense Refill    levothyroxine 25 mcg tablet Take 1 tablet (25 mcg total) by mouth daily (Patient not taking: Reported on 10/5/2020) 30 tablet 2    nystatin (MYCOSTATIN) ointment Apply topically 2 (two) times a day 30 g 2    oxyCODONE-acetaminophen (PERCOCET) 5-325 mg per tablet Take 1 tablet by mouth every 6 (six) hours as needed for severe pain for up to 30 dosesMax Daily Amount: 4 tablets (Patient not taking: Reported on 10/5/2020) 30 tablet 0    Prenatal Multivit-Min-Fe-FA (PRENATAL VITAMINS PO) Take by mouth      sertraline (ZOLOFT) 50 mg tablet Take 1/2 tablet for 1 week, then 1 tablet daily  (Patient not taking: Reported on 10/5/2020) 30 tablet 2    valACYclovir (VALTREX) 1,000 mg tablet Take 2 tablets (2,000 mg total) by mouth 2 (two) times a day for 1 day 4 tablet 0     No current facility-administered medications for this visit  Allergies   Allergen Reactions    Erythromycin Rash       Review of Systems   Constitutional: Negative  HENT: Positive for mouth sores (lip sores)  Negative for facial swelling, sinus pressure, sore throat and trouble swallowing  Respiratory: Negative  Skin: Positive for color change and rash  Video Exam    There were no vitals filed for this visit  Physical Exam  Vitals and nursing note reviewed  Constitutional:       General: She is not in acute distress  Appearance: Normal appearance  She is not ill-appearing     HENT: Head: Normocephalic and atraumatic  Mouth/Throat:      Lips: Lesions present  Mouth: No oral lesions  Pulmonary:      Effort: Pulmonary effort is normal  No respiratory distress  Skin:     Coloration: Skin is not pale  Findings: Lesion present  No rash  Neurological:      Mental Status: She is alert and oriented to person, place, and time  Psychiatric:         Mood and Affect: Mood normal           I spent 10 minutes directly with the patient during this visit      Calros 3 acknowledges that she has consented to an online visit or consultation  She understands that the online visit is based solely on information provided by her, and that, in the absence of a face-to-face physical evaluation by the physician, the diagnosis she receives is both limited and provisional in terms of accuracy and completeness  This is not intended to replace a full medical face-to-face evaluation by the physician  Ptio Beckwith understands and accepts these terms

## 2021-06-18 ENCOUNTER — TELEPHONE (OUTPATIENT)
Dept: FAMILY MEDICINE CLINIC | Facility: CLINIC | Age: 36
End: 2021-06-18

## 2021-06-18 NOTE — TELEPHONE ENCOUNTER
Jared Jackson called, she said that the Valtrex did not help clear up her sore on her lip  Can you send more in or something else?

## 2021-06-18 NOTE — TELEPHONE ENCOUNTER
The antiviral treatment will not immediately resolve the cold sore, however it will shorten the healing time   Recommend she continues otc pain relievers, cool compresses, topical aloe gel and follow up Monday if still not improving

## 2021-12-13 ENCOUNTER — VBI (OUTPATIENT)
Dept: ADMINISTRATIVE | Facility: OTHER | Age: 36
End: 2021-12-13

## 2022-01-25 ENCOUNTER — TELEPHONE (OUTPATIENT)
Dept: FAMILY MEDICINE CLINIC | Facility: CLINIC | Age: 37
End: 2022-01-25

## 2022-01-25 NOTE — TELEPHONE ENCOUNTER
Patient called and stated that she would like to talk to you about her thyroid and the issues she had while pregnant  She would like for blood work to be put in so she can get it done before she comes next week

## 2022-01-28 ENCOUNTER — APPOINTMENT (OUTPATIENT)
Dept: LAB | Facility: CLINIC | Age: 37
End: 2022-01-28
Payer: COMMERCIAL

## 2022-01-28 DIAGNOSIS — R79.89 ELEVATED TSH: ICD-10-CM

## 2022-01-28 DIAGNOSIS — N92.0 MENORRHAGIA WITH REGULAR CYCLE: ICD-10-CM

## 2022-01-28 DIAGNOSIS — Z13.220 SCREENING FOR LIPID DISORDERS: ICD-10-CM

## 2022-01-28 LAB
ALBUMIN SERPL BCP-MCNC: 3.9 G/DL (ref 3.5–5)
ALP SERPL-CCNC: 71 U/L (ref 46–116)
ALT SERPL W P-5'-P-CCNC: 40 U/L (ref 12–78)
ANION GAP SERPL CALCULATED.3IONS-SCNC: 7 MMOL/L (ref 4–13)
AST SERPL W P-5'-P-CCNC: 22 U/L (ref 5–45)
BASOPHILS # BLD AUTO: 0.05 THOUSANDS/ΜL (ref 0–0.1)
BASOPHILS NFR BLD AUTO: 1 % (ref 0–1)
BILIRUB SERPL-MCNC: 0.75 MG/DL (ref 0.2–1)
BUN SERPL-MCNC: 14 MG/DL (ref 5–25)
CALCIUM SERPL-MCNC: 9 MG/DL (ref 8.3–10.1)
CHLORIDE SERPL-SCNC: 103 MMOL/L (ref 100–108)
CHOLEST SERPL-MCNC: 169 MG/DL
CO2 SERPL-SCNC: 25 MMOL/L (ref 21–32)
CREAT SERPL-MCNC: 0.97 MG/DL (ref 0.6–1.3)
EOSINOPHIL # BLD AUTO: 0.2 THOUSAND/ΜL (ref 0–0.61)
EOSINOPHIL NFR BLD AUTO: 3 % (ref 0–6)
ERYTHROCYTE [DISTWIDTH] IN BLOOD BY AUTOMATED COUNT: 14.6 % (ref 11.6–15.1)
GFR SERPL CREATININE-BSD FRML MDRD: 74 ML/MIN/1.73SQ M
GLUCOSE P FAST SERPL-MCNC: 96 MG/DL (ref 65–99)
HCT VFR BLD AUTO: 42.1 % (ref 34.8–46.1)
HDLC SERPL-MCNC: 43 MG/DL
HGB BLD-MCNC: 13.7 G/DL (ref 11.5–15.4)
IMM GRANULOCYTES # BLD AUTO: 0.04 THOUSAND/UL (ref 0–0.2)
IMM GRANULOCYTES NFR BLD AUTO: 1 % (ref 0–2)
LDLC SERPL CALC-MCNC: 92 MG/DL (ref 0–100)
LYMPHOCYTES # BLD AUTO: 1.17 THOUSANDS/ΜL (ref 0.6–4.47)
LYMPHOCYTES NFR BLD AUTO: 16 % (ref 14–44)
MCH RBC QN AUTO: 28.4 PG (ref 26.8–34.3)
MCHC RBC AUTO-ENTMCNC: 32.5 G/DL (ref 31.4–37.4)
MCV RBC AUTO: 87 FL (ref 82–98)
MONOCYTES # BLD AUTO: 0.51 THOUSAND/ΜL (ref 0.17–1.22)
MONOCYTES NFR BLD AUTO: 7 % (ref 4–12)
NEUTROPHILS # BLD AUTO: 5.45 THOUSANDS/ΜL (ref 1.85–7.62)
NEUTS SEG NFR BLD AUTO: 72 % (ref 43–75)
NRBC BLD AUTO-RTO: 0 /100 WBCS
PLATELET # BLD AUTO: 321 THOUSANDS/UL (ref 149–390)
PMV BLD AUTO: 10.3 FL (ref 8.9–12.7)
POTASSIUM SERPL-SCNC: 4.3 MMOL/L (ref 3.5–5.3)
PROT SERPL-MCNC: 8.2 G/DL (ref 6.4–8.2)
RBC # BLD AUTO: 4.82 MILLION/UL (ref 3.81–5.12)
SODIUM SERPL-SCNC: 135 MMOL/L (ref 136–145)
T3FREE SERPL-MCNC: 2.67 PG/ML (ref 2.3–4.2)
T4 FREE SERPL-MCNC: 0.99 NG/DL (ref 0.76–1.46)
TRIGL SERPL-MCNC: 168 MG/DL
TSH SERPL DL<=0.05 MIU/L-ACNC: 3.03 UIU/ML (ref 0.36–3.74)
WBC # BLD AUTO: 7.42 THOUSAND/UL (ref 4.31–10.16)

## 2022-01-28 PROCEDURE — 86800 THYROGLOBULIN ANTIBODY: CPT

## 2022-01-28 PROCEDURE — 80053 COMPREHEN METABOLIC PANEL: CPT

## 2022-01-28 PROCEDURE — 84443 ASSAY THYROID STIM HORMONE: CPT

## 2022-01-28 PROCEDURE — 36415 COLL VENOUS BLD VENIPUNCTURE: CPT

## 2022-01-28 PROCEDURE — 84481 FREE ASSAY (FT-3): CPT

## 2022-01-28 PROCEDURE — 86376 MICROSOMAL ANTIBODY EACH: CPT

## 2022-01-28 PROCEDURE — 85025 COMPLETE CBC W/AUTO DIFF WBC: CPT

## 2022-01-28 PROCEDURE — 84439 ASSAY OF FREE THYROXINE: CPT

## 2022-01-28 PROCEDURE — 80061 LIPID PANEL: CPT

## 2022-01-29 LAB
THYROGLOB AB SERPL-ACNC: 1 IU/ML (ref 0–0.9)
THYROPEROXIDASE AB SERPL-ACNC: 68 IU/ML (ref 0–34)

## 2022-02-01 ENCOUNTER — OFFICE VISIT (OUTPATIENT)
Dept: FAMILY MEDICINE CLINIC | Facility: CLINIC | Age: 37
End: 2022-02-01
Payer: COMMERCIAL

## 2022-02-01 VITALS
HEIGHT: 63 IN | HEART RATE: 88 BPM | DIASTOLIC BLOOD PRESSURE: 78 MMHG | OXYGEN SATURATION: 98 % | TEMPERATURE: 98.9 F | SYSTOLIC BLOOD PRESSURE: 122 MMHG | WEIGHT: 208 LBS | BODY MASS INDEX: 36.86 KG/M2

## 2022-02-01 DIAGNOSIS — R76.8 THYROID ANTIBODY POSITIVE: Primary | ICD-10-CM

## 2022-02-01 DIAGNOSIS — N92.0 MENORRHAGIA WITH REGULAR CYCLE: ICD-10-CM

## 2022-02-01 DIAGNOSIS — O34.10 UTERINE FIBROID IN PREGNANCY: ICD-10-CM

## 2022-02-01 DIAGNOSIS — D25.9 UTERINE FIBROID IN PREGNANCY: ICD-10-CM

## 2022-02-01 DIAGNOSIS — K42.9 UMBILICAL HERNIA WITHOUT OBSTRUCTION AND WITHOUT GANGRENE: ICD-10-CM

## 2022-02-01 PROBLEM — N64.4 PAINFUL BREASTS: Status: RESOLVED | Noted: 2020-05-05 | Resolved: 2022-02-01

## 2022-02-01 PROBLEM — O99.210 OBESITY COMPLICATING PREGNANCY: Status: RESOLVED | Noted: 2019-10-14 | Resolved: 2022-02-01

## 2022-02-01 PROBLEM — Z3A.39 39 WEEKS GESTATION OF PREGNANCY: Status: RESOLVED | Noted: 2020-04-20 | Resolved: 2022-02-01

## 2022-02-01 PROCEDURE — 99214 OFFICE O/P EST MOD 30 MIN: CPT | Performed by: NURSE PRACTITIONER

## 2022-02-01 NOTE — ASSESSMENT & PLAN NOTE
Will update pelvic US recent cbc was normal and f/u with GYN to discuss her options for her heavy cycles and bleeding

## 2022-02-01 NOTE — PROGRESS NOTES
Assessment/Plan:           Problem List Items Addressed This Visit        Genitourinary    RESOLVED: Uterine fibroid in pregnancy       Other    Umbilical hernia without obstruction and without gangrene     DW patient nontender and will monitor at this point          Menorrhagia with regular cycle     Will update pelvic US recent cbc was normal and f/u with GYN to discuss her options for her heavy cycles and bleeding          Relevant Orders    US pelvis complete non OB    Body mass index (BMI) of 36 0-36 9 in adult    Thyroid antibody positive - Primary     Will recheck levels in 6 months dw patient dietary measures and also consider selenium supplement          Relevant Orders    Thyroid Antibodies Panel    TSH, 3rd generation with Free T4 reflex    US thyroid            Subjective:      Patient ID: Asha Patel is a 39 y o  female  Patient here today for her check up and to review her recent labs that she had completed  When she had her pregnancy following had fluctuations in her thyroid  Patient reports that her mom has thyroid disease  The following portions of the patient's history were reviewed and updated as appropriate: BMI Counseling: Body mass index is 36 85 kg/m²  The BMI is above normal  Nutrition recommendations include decreasing portion sizes, encouraging healthy choices of fruits and vegetables, decreasing fast food intake, consuming healthier snacks, limiting drinks that contain sugar, moderation in carbohydrate intake, increasing intake of lean protein, reducing intake of saturated and trans fat and reducing intake of cholesterol  Exercise recommendations include moderate physical activity 150 minutes/week  No pharmacotherapy was ordered  Patient referred to PCP  Rationale for BMI follow-up plan is due to patient being overweight or obese  She  has a past medical history of Abdominal hernia, Stress incontinence, and Varicella    She   Patient Active Problem List    Diagnosis Date Noted    Body mass index (BMI) of 36 0-36 9 in adult 02/01/2022    Thyroid antibody positive 02/01/2022    Menorrhagia with regular cycle 92/09/9566    Umbilical hernia without obstruction and without gangrene 02/14/2019     She  has a past surgical history that includes Colposcopy (2002) and Gynecologic cryosurgery (2002)  Her family history includes Alzheimer's disease in her maternal grandfather; Asthma in her mother; Colon cancer in her maternal uncle; Heart disease in her maternal grandmother; Hyperlipidemia in her father; Hypertension in her father; No Known Problems in her brother, sister, sister, and son  She  reports that she has never smoked  She has never used smokeless tobacco  She reports previous alcohol use  She reports that she does not use drugs  No current outpatient medications on file  No current facility-administered medications for this visit  She is allergic to erythromycin       Review of Systems   Constitutional: Negative for activity change, appetite change, chills, diaphoresis, fatigue, fever and unexpected weight change  Patient has weight gain and some hot flashes    HENT: Negative for congestion, ear pain, hearing loss, postnasal drip, sinus pressure, sinus pain, sneezing, sore throat, trouble swallowing and voice change  Eyes: Negative for pain, redness and visual disturbance  Respiratory: Negative for cough and shortness of breath  Cardiovascular: Negative for chest pain and leg swelling  Gastrointestinal: Negative for abdominal pain, diarrhea, nausea and vomiting  Endocrine: Positive for heat intolerance  Negative for polydipsia, polyphagia and polyuria  Genitourinary: Negative  Heavy gyn bleeding    Musculoskeletal: Negative for arthralgias  Skin: Negative  Allergic/Immunologic: Negative  Neurological: Negative for dizziness and light-headedness  Hematological: Negative      Psychiatric/Behavioral: Negative for behavioral problems and dysphoric mood  Objective:      /78   Pulse 88   Temp 98 9 °F (37 2 °C)   Ht 5' 3" (1 6 m)   Wt 94 3 kg (208 lb)   SpO2 98%   BMI 36 85 kg/m²          Physical Exam  Vitals and nursing note reviewed  Constitutional:       General: She is not in acute distress  Appearance: She is well-developed  HENT:      Head: Normocephalic and atraumatic  Eyes:      Pupils: Pupils are equal, round, and reactive to light  Neck:      Thyroid: No thyromegaly  Cardiovascular:      Rate and Rhythm: Normal rate and regular rhythm  Heart sounds: Normal heart sounds  No murmur heard  Pulmonary:      Effort: Pulmonary effort is normal  No respiratory distress  Breath sounds: Normal breath sounds  No wheezing  Abdominal:      General: Bowel sounds are normal       Palpations: Abdomen is soft  Musculoskeletal:         General: Normal range of motion  Cervical back: Normal range of motion  Skin:     General: Skin is warm and dry  Neurological:      Mental Status: She is alert and oriented to person, place, and time

## 2022-02-03 ENCOUNTER — NURSE TRIAGE (OUTPATIENT)
Dept: OTHER | Facility: OTHER | Age: 37
End: 2022-02-03

## 2022-02-03 ENCOUNTER — TELEMEDICINE (OUTPATIENT)
Dept: FAMILY MEDICINE CLINIC | Facility: CLINIC | Age: 37
End: 2022-02-03
Payer: COMMERCIAL

## 2022-02-03 VITALS — BODY MASS INDEX: 36.86 KG/M2 | HEIGHT: 63 IN | WEIGHT: 208 LBS

## 2022-02-03 DIAGNOSIS — J02.9 PHARYNGITIS, UNSPECIFIED ETIOLOGY: Primary | ICD-10-CM

## 2022-02-03 PROCEDURE — 3008F BODY MASS INDEX DOCD: CPT | Performed by: NURSE PRACTITIONER

## 2022-02-03 PROCEDURE — 99213 OFFICE O/P EST LOW 20 MIN: CPT | Performed by: NURSE PRACTITIONER

## 2022-02-03 PROCEDURE — 1036F TOBACCO NON-USER: CPT | Performed by: NURSE PRACTITIONER

## 2022-02-03 RX ORDER — AMOXICILLIN 875 MG/1
875 TABLET, COATED ORAL 2 TIMES DAILY
Qty: 14 TABLET | Refills: 0 | Status: SHIPPED | OUTPATIENT
Start: 2022-02-03 | End: 2022-02-10

## 2022-02-03 NOTE — PROGRESS NOTES
Virtual Regular Visit    Verification of patient location:    Patient is located in the following state in which I hold an active license [place]      Assessment/Plan:    Problem List Items Addressed This Visit        Digestive    Pharyngitis - Primary     Patient is fully vaccinated against COVID declines testing will cover for infection and call for any new symptoms patient agreeable          Relevant Medications    amoxicillin (AMOXIL) 875 mg tablet               Reason for visit is   Chief Complaint   Patient presents with    Virtual Regular Visit     sore throat goes into strep needs antibiotics    Virtual Regular Visit        Encounter provider ISHMAEL Powell    Provider located at [place] [address]  [address] [place] [id number]      Recent Visits  Date Type Provider Dept   02/01/22 Office Visit ISHMAEL Powell [facility] [address] recent visits within past 7 days and meeting all other requirements  Today's Visits  Date Type Provider Dept   02/03/22 Telemedicine [facility]    Showing today's visits and meeting all other requirements  Future Appointments  No visits were found meeting these conditions  Showing future appointments within next 150 days and meeting all other requirements       The patient was identified by name and date of birth  Yohannes Alan was informed that this is a telemedicine visit and that the visit is being conducted through Telephone  My office door was closed  No one else was in the room  She acknowledged consent and understanding of privacy and security of the video platform  The patient has agreed to participate and understands they can discontinue the visit at any time  Patient is aware this is a billable service       Swetha Weeks is a 39 y o  female     Patient calling and reports that she started with a sore throat last night and this morning and reports that she is prone to strep throat and last episode was > 1 year ago  Positive sick conatcts with similar        Past Medical History:   Diagnosis Date    Abdominal hernia     Stress incontinence     Varicella     as child        Past Surgical History:   Procedure Laterality Date    COLPOSCOPY  2002    GYNECOLOGIC CRYOSURGERY  2002       Current Outpatient Medications   Medication Sig Dispense Refill    amoxicillin (AMOXIL) 875 mg tablet Take 1 tablet (875 mg total) by mouth 2 (two) times a day for 7 days 14 tablet 0     No current facility-administered medications for this visit  Allergies   Allergen Reactions    Erythromycin Rash       Review of Systems   Constitutional: Negative for activity change, appetite change, chills, diaphoresis, fatigue, fever and unexpected weight change  HENT: Positive for sore throat  Negative for congestion, ear pain, hearing loss, postnasal drip, sinus pressure, sinus pain and sneezing  Eyes: Negative for pain, redness and visual disturbance  Respiratory: Negative for cough and shortness of breath  Cardiovascular: Negative for chest pain and leg swelling  Gastrointestinal: Negative for abdominal pain, diarrhea, nausea and vomiting  Endocrine: Negative  Genitourinary: Negative  Musculoskeletal: Negative for arthralgias  Skin: Negative  Allergic/Immunologic: Negative  Neurological: Negative for dizziness and light-headedness  Hematological: Negative  Psychiatric/Behavioral: Negative for behavioral problems and dysphoric mood  Video Exam    Vitals:    02/03/22 0718   Weight: 94 3 kg (208 lb)   Height: 5' 3" (1 6 m)       Physical Exam Unable to do physical was on the phone     I spent 10 minutes directly with the patient during this visit    VIRTUAL VISIT Prakash Haro verbally agrees to participate in [employer]   Pt is aware that Virtual Care Services could be limited without vital signs or the ability to perform a full hands-on physical Daxa Timmons understands she or the provider may request at any time to terminate the video visit and request the patient to seek care or treatment in person

## 2022-02-03 NOTE — ASSESSMENT & PLAN NOTE
Patient is fully vaccinated against COVID declines testing will cover for infection and call for any new symptoms patient agreeable

## 2022-02-03 NOTE — TELEPHONE ENCOUNTER
Reason for Disposition   SEVERE (e g , excruciating) throat pain    Answer Assessment - Initial Assessment Questions  1  ONSET: "When did the throat start hurting?" (Hours or days ago)       This morning  2  SEVERITY: "How bad is the sore throat?" (Scale 1-10; mild, moderate or severe)    - MILD (1-3):  doesn't interfere with eating or normal activities    - MODERATE (4-7): interferes with eating some solids and normal activities    - SEVERE (8-10):  excruciating pain, interferes with most normal activities    - SEVERE DYSPHAGIA: can't swallow liquids, drooling      8/10  3  STREP EXPOSURE: "Has there been any exposure to strep within the past week?" If Yes, ask: "What type of contact occurred?"       Maybe mom  4  VIRAL SYMPTOMS: "Are there any symptoms of a cold, such as a runny nose, cough, hoarse voice or red eyes?"       Denies symptoms, denies covid exposure  5  FEVER: "Do you have a fever?" If Yes, ask: "What is your temperature, how was it measured, and when did it start?"      Denies  6  PUS ON THE TONSILS: "Is there pus on the tonsils in the back of your throat?"      White  7  OTHER SYMPTOMS: "Do you have any other symptoms?" (e g , difficulty breathing, headache, rash)      Denies  8   PREGNANCY: "Is there any chance you are pregnant?" "When was your last menstrual period?"      Denies    Protocols used: SORE THROAT-ADULT-

## 2022-02-08 ENCOUNTER — HOSPITAL ENCOUNTER (OUTPATIENT)
Dept: ULTRASOUND IMAGING | Facility: HOSPITAL | Age: 37
Discharge: HOME | End: 2022-02-08
Payer: COMMERCIAL

## 2022-02-08 DIAGNOSIS — N92.0 MENORRHAGIA WITH REGULAR CYCLE: ICD-10-CM

## 2022-02-08 DIAGNOSIS — R76.8 THYROID ANTIBODY POSITIVE: ICD-10-CM

## 2022-02-08 PROCEDURE — 76856 US EXAM PELVIC COMPLETE: CPT

## 2022-02-08 PROCEDURE — 76830 TRANSVAGINAL US NON-OB: CPT

## 2022-02-08 PROCEDURE — 76536 US EXAM OF HEAD AND NECK: CPT

## 2022-05-03 ENCOUNTER — PROCEDURE VISIT (OUTPATIENT)
Dept: OBGYN CLINIC | Age: 37
End: 2022-05-03
Payer: COMMERCIAL

## 2022-05-03 VITALS — DIASTOLIC BLOOD PRESSURE: 82 MMHG | BODY MASS INDEX: 38.9 KG/M2 | WEIGHT: 219.6 LBS | SYSTOLIC BLOOD PRESSURE: 124 MMHG

## 2022-05-03 DIAGNOSIS — Z12.4 ENCOUNTER FOR SCREENING FOR CERVICAL CANCER: ICD-10-CM

## 2022-05-03 DIAGNOSIS — N92.0 MENORRHAGIA WITH REGULAR CYCLE: Primary | ICD-10-CM

## 2022-05-03 PROCEDURE — 88305 TISSUE EXAM BY PATHOLOGIST: CPT | Performed by: PATHOLOGY

## 2022-05-03 PROCEDURE — G0145 SCR C/V CYTO,THINLAYER,RESCR: HCPCS | Performed by: NURSE PRACTITIONER

## 2022-05-03 PROCEDURE — G0476 HPV COMBO ASSAY CA SCREEN: HCPCS | Performed by: NURSE PRACTITIONER

## 2022-05-03 PROCEDURE — 58100 BIOPSY OF UTERUS LINING: CPT | Performed by: NURSE PRACTITIONER

## 2022-05-03 NOTE — PROGRESS NOTES
Endometrial biopsy    Date/Time: 5/3/2022 4:12 PM  Performed by: ISHMAEL Crain  Authorized by: ISHMAEL Crain   Universal Protocol:  Consent: Verbal consent obtained  Risks and benefits: risks, benefits and alternatives were discussed  Consent given by: patient  Timeout called at: 5/3/2022 3:45 PM   Patient understanding: patient states understanding of the procedure being performed  Patient consent: the patient's understanding of the procedure matches consent given  Procedure consent: procedure consent matches procedure scheduled  Relevant documents: relevant documents present and verified  Test results: test results available and properly labeled  Radiology Images displayed and confirmed  If images not available, report reviewed: imaging studies available  Required items: required blood products, implants, devices, and special equipment available  Patient identity confirmed: verbally with patient      Indication:     Indications: Other disorder of menstruation and other abnormal bleeding from female genital tract    Procedure:     Procedure: endometrial biopsy with Pipelle      A bivalve speculum was placed in the vagina: yes      Cervix cleaned and prepped: yes      A paracervical block was performed: no      An intracervical block was performed: no      The cervix was dilated: yes      Uterus sounded: yes      Uterus sound depth (cm):  8 5    Specimen collected: specimen collected and sent to pathology      Patient tolerated procedure well with no complications: yes    Findings:     Uterus size:  Non-gravid    Cervix: normal    Comments:     Procedure comments:  Patient presents for endometrial biopsy for history of menorrhagia with regular cycle  She is interested in a hysterectomy  She is not interested in any hormonal regulation for her cycles  She reports heavy cycles all her life  She is done with childbearing  A Pap was not noted in her chart since 2017 so I did one today   Last intercourse 4 weeks ago  LMP 4/19   Unable to give urine for PT

## 2022-05-03 NOTE — PATIENT INSTRUCTIONS
Menorrhagia   AMBULATORY CARE:   Menorrhagia  is heavy menstrual bleeding for more than 7 days or severe menstrual bleeding for less than 7 days  Your menstrual bleeding and cramping are so heavy that you have trouble doing your usual daily activities  Your monthly period may also occur more often, and you may bleed between periods  Menorrhagia is common in adolescence and around menopause  Common symptoms include the following:   · Soaking a pad or tampon every 1 to 2 hours    · Using both a pad and a tampon    · Waking up at night to change your pad or tampon    · Blood clots with your bleeding for more than 1 day    · Abdominal pain or cramps    Call your local emergency number (911 in the [address] [facility]) for any of the following:   · You have chest pain and shortness of breath  · Your heart is fluttering or beating faster than usual for you  Seek care immediately if:   · You feel dizzy when you stand  · You feel confused  · You have severe abdominal pain, nausea, and vomiting  · Your skin or the whites of your eyes turn yellow  Call your doctor or gynecologist if:   · You need to change your pad or tampon more than 1 time per hour, for several hours in a row  · You feel more weak and tired than usual     · You have new coldness in your hands and feet  · You have questions or concerns about your condition or care  Medicines: You may need any of the following:  · Iron supplements  may be given if your blood iron level decreases because of heavy bleeding  · NSAIDs , such as ibuprofen, help decrease swelling, pain, and fever  NSAIDs can cause stomach bleeding or kidney problems in certain people  If you take blood thinner medicine, always ask your healthcare provider if NSAIDs are safe for you  Always read the medicine label and follow directions  · Hormones  help slow or stop your bleeding and make your monthly periods more regular   This medicine may be given as birth control pills or an intrauterine device (IUD)  · Take your medicine as directed  Contact your healthcare provider if you think your medicine is not helping or if you have side effects  Tell him of her if you are allergic to any medicine  Keep a list of the medicines, vitamins, and herbs you take  Include the amounts, and when and why you take them  Bring the list or the pill bottles to follow-up visits  Carry your medicine list with you in case of an emergency  Manage your symptoms:   · Keep a supply of pads or tampons with you at all times  If possible, stay close to a bathroom  · Apply heat  on your abdomen to decrease pain and cramps  You can use a heating pad on a low setting  Apply heat for 20 to 30 minutes every 2 hours for as many days as directed  Follow up with your doctor or gynecologist as directed: You may need regular pelvic exams with Pap smears to monitor your condition  Write down your questions so you remember to ask them during your visits  © Copyright [employer] 2022 Information is for End User's use only and may not be sold, redistributed or otherwise used for commercial purposes  All illustrations and images included in CareNotes® are the copyrighted property of A D A M , Inc  or [facility] Elizabeth Addison   The above information is an  only  It is not intended as medical advice for individual conditions or treatments  Talk to your doctor, nurse or pharmacist before following any medical regimen to see if it is safe and effective for you

## 2022-05-05 LAB
HPV HR 12 DNA CVX QL NAA+PROBE: NEGATIVE
HPV16 DNA CVX QL NAA+PROBE: NEGATIVE
HPV18 DNA CVX QL NAA+PROBE: NEGATIVE

## 2022-05-10 LAB
LAB AP GYN PRIMARY INTERPRETATION: NORMAL
Lab: NORMAL

## 2022-05-11 ENCOUNTER — VBI (OUTPATIENT)
Dept: ADMINISTRATIVE | Facility: OTHER | Age: 37
End: 2022-05-11

## 2022-05-23 ENCOUNTER — OFFICE VISIT (OUTPATIENT)
Dept: OBGYN CLINIC | Age: 37
End: 2022-05-23
Payer: COMMERCIAL

## 2022-05-23 VITALS
WEIGHT: 216.8 LBS | HEIGHT: 63 IN | BODY MASS INDEX: 38.41 KG/M2 | SYSTOLIC BLOOD PRESSURE: 130 MMHG | DIASTOLIC BLOOD PRESSURE: 88 MMHG

## 2022-05-23 DIAGNOSIS — N92.0 MENORRHAGIA WITH REGULAR CYCLE: Primary | ICD-10-CM

## 2022-05-23 PROCEDURE — 1036F TOBACCO NON-USER: CPT | Performed by: STUDENT IN AN ORGANIZED HEALTH CARE EDUCATION/TRAINING PROGRAM

## 2022-05-23 PROCEDURE — 99214 OFFICE O/P EST MOD 30 MIN: CPT | Performed by: STUDENT IN AN ORGANIZED HEALTH CARE EDUCATION/TRAINING PROGRAM

## 2022-05-23 PROCEDURE — 3008F BODY MASS INDEX DOCD: CPT | Performed by: STUDENT IN AN ORGANIZED HEALTH CARE EDUCATION/TRAINING PROGRAM

## 2022-05-23 NOTE — PROGRESS NOTES
Assessment/Plan:     Problem List Items Addressed This Visit        Other    Menorrhagia with regular cycle - Primary        - discussed etiology and management of AUB  Most likely adenomyosis as cause for Ju  Expectant, medical, surgical management reviewed in detail    -Desires definitive management with total hysterectomy: we discussed risks, benefits, alternatives  We discussed risks including bleeding, infection, damage to surrounding organs (bowel, bladder), nerves and vessels, DVT/PE, very unlikely risk of death  Discussed minimal risks with blood transfusion to include transfusion reaction, TRALI, rare transmission of HIV/Hepatitis C  Discussed bilateral salpingectomy and benefit of decreased risk of ovarian cancer, cystoscopy  Discussed plan to leave ovaries in situ  Benefits include removal cervical dysplasia, decreased risk ovarian cancer  Patient wishes to proceed  -Will plan to proceed with TVH/BS, EUA  Consent form reviewed in detail with patient, signed by patient      Subjective:      Patient ID: Shanell Stanley is a 44 y o  female  HPI  She presents today for surgical consultation for heavy uterine bleeding  She notes years of heavy bleeding, has requested hysterectomy several times  She currently has heavy bleeding with menses, changing a pad every hour  She has tried OCPs and depo, NSAIDs for her menstrual cycle without success  With mood symptoms and nausea with CHC  Ultrasound with some evidence of adenomyosis, pedunculated fibroid  She is finished with childbearing, does not desire fertility  Her sister is having a hysterectomy soon, with Dr Kalpesh Fernandez, for the same symptoms  Many women in her family are afflicted by heavy menstrual bleeding       The following portions of the patient's history were reviewed and updated as appropriate: allergies, current medications, past family history, past medical history, past social history, past surgical history and problem list     Review of Systems  as above    Objective:  /88 (BP Location: Right arm, Patient Position: Sitting, Cuff Size: Large)   Ht 5' 3" (1 6 m)   Wt 98 3 kg (216 lb 12 8 oz)   LMP 05/14/2022 (Exact Date)   BMI 38 40 kg/m²      Physical Exam  Vitals reviewed  Constitutional:       Appearance: She is well-developed  HENT:      Head: Normocephalic  Cardiovascular:      Rate and Rhythm: Normal rate  Pulmonary:      Effort: Pulmonary effort is normal    Abdominal:      General: There is no distension  Palpations: Abdomen is soft  Tenderness: There is no abdominal tenderness  There is no guarding or rebound  Genitourinary:     General: Normal vulva  Exam position: Lithotomy position  Vagina: No bleeding  Cervix: No cervical motion tenderness  Comments: Small, mobile uterus and cervix  Minimal descensus on exam, but no constriction of multiparous vagina  Musculoskeletal:         General: Normal range of motion  Cervical back: Normal range of motion  Skin:     General: Skin is warm and dry  Neurological:      Mental Status: She is alert and oriented to person, place, and time  Psychiatric:         Behavior: Behavior normal          TVUS reviewed: somewhat heterogeneous uterus, particularly in SAADIA   Thickened endometrium at fundus, with potential polyp     Overall MDM: moderate   Diagnosis moderate, Data moderate, Risk moderate

## 2022-05-24 ENCOUNTER — TELEPHONE (OUTPATIENT)
Dept: OBGYN CLINIC | Facility: CLINIC | Age: 37
End: 2022-05-24

## 2022-05-25 NOTE — TELEPHONE ENCOUNTER
Talked to patient she is scheduled for her procedure with Dr Rafa Calle on 11/23/22 in the [facility]

## 2022-06-14 ENCOUNTER — TELEPHONE (OUTPATIENT)
Dept: FAMILY MEDICINE CLINIC | Facility: CLINIC | Age: 37
End: 2022-06-14

## 2022-06-14 DIAGNOSIS — R05.9 COUGH: Primary | ICD-10-CM

## 2022-06-14 RX ORDER — BENZONATATE 200 MG/1
200 CAPSULE ORAL 3 TIMES DAILY PRN
Qty: 20 CAPSULE | Refills: 0 | Status: SHIPPED | OUTPATIENT
Start: 2022-06-14

## 2022-06-14 NOTE — TELEPHONE ENCOUNTER
Pt has a cough for about a week and a half    she said it's a crazy cough    no otc meds have helped so is asking if  will prescribe med for her ???

## 2022-06-16 ENCOUNTER — APPOINTMENT (OUTPATIENT)
Dept: RADIOLOGY | Facility: CLINIC | Age: 37
End: 2022-06-16
Payer: COMMERCIAL

## 2022-06-16 ENCOUNTER — OFFICE VISIT (OUTPATIENT)
Dept: FAMILY MEDICINE CLINIC | Facility: CLINIC | Age: 37
End: 2022-06-16
Payer: COMMERCIAL

## 2022-06-16 VITALS
HEART RATE: 89 BPM | DIASTOLIC BLOOD PRESSURE: 78 MMHG | OXYGEN SATURATION: 97 % | WEIGHT: 215 LBS | SYSTOLIC BLOOD PRESSURE: 118 MMHG | TEMPERATURE: 97.9 F | HEIGHT: 63 IN | BODY MASS INDEX: 38.09 KG/M2

## 2022-06-16 DIAGNOSIS — R05.3 CHRONIC COUGH: ICD-10-CM

## 2022-06-16 DIAGNOSIS — J40 BRONCHITIS: Primary | ICD-10-CM

## 2022-06-16 DIAGNOSIS — J40 BRONCHITIS: ICD-10-CM

## 2022-06-16 PROCEDURE — 3008F BODY MASS INDEX DOCD: CPT | Performed by: PHYSICIAN ASSISTANT

## 2022-06-16 PROCEDURE — 1036F TOBACCO NON-USER: CPT | Performed by: PHYSICIAN ASSISTANT

## 2022-06-16 PROCEDURE — 71046 X-RAY EXAM CHEST 2 VIEWS: CPT

## 2022-06-16 PROCEDURE — 99214 OFFICE O/P EST MOD 30 MIN: CPT | Performed by: PHYSICIAN ASSISTANT

## 2022-06-16 RX ORDER — DEXTROMETHORPHAN HYDROBROMIDE AND PROMETHAZINE HYDROCHLORIDE 15; 6.25 MG/5ML; MG/5ML
5 SOLUTION ORAL 4 TIMES DAILY PRN
Qty: 118 ML | Refills: 0 | Status: SHIPPED | OUTPATIENT
Start: 2022-06-16

## 2022-06-16 RX ORDER — AZITHROMYCIN 250 MG/1
TABLET, FILM COATED ORAL
Qty: 6 TABLET | Refills: 0 | Status: SHIPPED | OUTPATIENT
Start: 2022-06-16 | End: 2022-06-21

## 2022-06-16 RX ORDER — DEXAMETHASONE 4 MG/1
2 TABLET ORAL 2 TIMES DAILY
Qty: 12 G | Refills: 0 | Status: SHIPPED | OUTPATIENT
Start: 2022-06-16

## 2022-06-16 NOTE — PROGRESS NOTES
Assessment/Plan:       Problem List Items Addressed This Visit    None     Visit Diagnoses     Bronchitis    -  Primary    Relevant Medications    fluticasone (Flovent HFA) 110 MCG/ACT inhaler    Promethazine-DM (PHENERGAN-DM) 6 25-15 mg/5 mL oral syrup    azithromycin (Zithromax) 250 mg tablet    Other Relevant Orders    XR chest pa & lateral    Chronic cough            acute worsening of cough in the setting of chronic cough since covid 1 month ago  Recommend covering with azithromycin, check CXR  2 weeks of flovent  Cough suppressant  Follow up prn  VSS, lungs sound clear today     Subjective:      Patient ID: Shaye Herzog is a 44 y o  female  Pt presents with a cough for about 1 month since covid  No other sx  The cough seems to have worsened over the past week  She has been taking tessalon, no improvement  No SOB or wheezing  No fevers  The following portions of the patient's history were reviewed and updated as appropriate:   She  has a past medical history of Abdominal hernia, Stress incontinence, and Varicella  She   Patient Active Problem List    Diagnosis Date Noted    Pharyngitis 02/03/2022    Thyroid antibody positive 02/01/2022    Menorrhagia with regular cycle 89/52/4470    Umbilical hernia without obstruction and without gangrene 02/14/2019     She  has a past surgical history that includes Colposcopy (2002) and Gynecologic cryosurgery (2002)  Her family history includes Alzheimer's disease in her maternal grandfather; Asthma in her mother; Colon cancer in her maternal uncle; Heart disease in her maternal grandmother; Hyperlipidemia in her father; Hypertension in her father; No Known Problems in her brother, sister, sister, and son  She  reports that she has never smoked  She has never used smokeless tobacco  She reports current alcohol use of about 1 0 standard drink of alcohol per week  She reports that she does not use drugs    Current Outpatient Medications   Medication Sig Dispense Refill    azithromycin (Zithromax) 250 mg tablet Take 2 tablets (500 mg total) by mouth daily for 1 day, THEN 1 tablet (250 mg total) daily for 4 days  6 tablet 0    fluticasone (Flovent HFA) 110 MCG/ACT inhaler Inhale 2 puffs 2 (two) times a day Rinse mouth after use  12 g 0    Promethazine-DM (PHENERGAN-DM) 6 25-15 mg/5 mL oral syrup Take 5 mL by mouth 4 (four) times a day as needed for cough 118 mL 0    benzonatate (TESSALON) 200 MG capsule Take 1 capsule (200 mg total) by mouth 3 (three) times a day as needed for cough 20 capsule 0     No current facility-administered medications for this visit  Current Outpatient Medications on File Prior to Visit   Medication Sig    benzonatate (TESSALON) 200 MG capsule Take 1 capsule (200 mg total) by mouth 3 (three) times a day as needed for cough     No current facility-administered medications on file prior to visit  She has No Known Allergies       Review of Systems   Constitutional: Negative for chills, fatigue and fever  HENT: Negative for congestion, ear pain, hearing loss, nosebleeds, postnasal drip, rhinorrhea, sinus pressure, sinus pain, sneezing and sore throat  Eyes: Negative for pain, discharge, itching and visual disturbance  Respiratory: Positive for cough  Negative for chest tightness, shortness of breath and wheezing  Cardiovascular: Negative for chest pain, palpitations and leg swelling  Gastrointestinal: Negative for abdominal pain, blood in stool, constipation, diarrhea, nausea and vomiting  Genitourinary: Negative for frequency and urgency  Neurological: Negative for dizziness, light-headedness and numbness  Objective:      /78 (BP Location: Left arm, Patient Position: Sitting)   Pulse 89   Temp 97 9 °F (36 6 °C)   Ht 5' 3" (1 6 m)   Wt 97 5 kg (215 lb)   SpO2 97%   BMI 38 09 kg/m²          Physical Exam  Vitals and nursing note reviewed     Constitutional:       General: She is not in acute distress  Appearance: Normal appearance  HENT:      Head: Normocephalic and atraumatic  Right Ear: Tympanic membrane, ear canal and external ear normal       Left Ear: Tympanic membrane, ear canal and external ear normal       Nose: Nose normal       Mouth/Throat:      Mouth: Mucous membranes are moist       Pharynx: Oropharynx is clear  No oropharyngeal exudate or posterior oropharyngeal erythema  Eyes:      Pupils: Pupils are equal, round, and reactive to light  Cardiovascular:      Rate and Rhythm: Normal rate and regular rhythm  Heart sounds: Normal heart sounds  No murmur heard  Pulmonary:      Effort: Pulmonary effort is normal  No respiratory distress  Breath sounds: Normal breath sounds  No wheezing, rhonchi or rales  Musculoskeletal:         General: Normal range of motion  Cervical back: Normal range of motion and neck supple  Skin:     General: Skin is warm and dry  Neurological:      Mental Status: She is alert and oriented to person, place, and time     Psychiatric:         Mood and Affect: Mood and affect normal

## 2022-09-06 ENCOUNTER — TELEPHONE (OUTPATIENT)
Dept: OBGYN CLINIC | Facility: CLINIC | Age: 37
End: 2022-09-06

## 2022-09-06 NOTE — TELEPHONE ENCOUNTER
Called patient on 9/1/22 LM cell phone Dr Pedro Rincon is on vacation, we need to reschedule her procedure on 11/23/22  I called to day Voicemail full could not LM today

## 2022-09-14 NOTE — TELEPHONE ENCOUNTER
Called and talked to patient she is going to hold off on her surgical procedure for now and will call us back when she would like to reschedule

## 2022-10-03 ENCOUNTER — TELEMEDICINE (OUTPATIENT)
Dept: FAMILY MEDICINE CLINIC | Facility: CLINIC | Age: 37
End: 2022-10-03
Payer: COMMERCIAL

## 2022-10-03 DIAGNOSIS — J40 BRONCHITIS: ICD-10-CM

## 2022-10-03 PROCEDURE — 99213 OFFICE O/P EST LOW 20 MIN: CPT | Performed by: FAMILY MEDICINE

## 2022-10-03 RX ORDER — DEXTROMETHORPHAN HYDROBROMIDE AND PROMETHAZINE HYDROCHLORIDE 15; 6.25 MG/5ML; MG/5ML
5 SOLUTION ORAL 4 TIMES DAILY PRN
Qty: 240 ML | Refills: 0 | Status: SHIPPED | OUTPATIENT
Start: 2022-10-03

## 2022-10-03 RX ORDER — AMOXICILLIN AND CLAVULANATE POTASSIUM 875; 125 MG/1; MG/1
1 TABLET, FILM COATED ORAL EVERY 12 HOURS SCHEDULED
Qty: 20 TABLET | Refills: 0 | Status: SHIPPED | OUTPATIENT
Start: 2022-10-03 | End: 2022-10-13

## 2022-10-03 NOTE — PROGRESS NOTES
Virtual Regular Visit    Verification of patient location:    Patient is located in the following state in which I hold an active license [place]      Assessment/Plan:    Problem List Items Addressed This Visit    None     Visit Diagnoses     Bronchitis        Relevant Medications    Promethazine-DM (PHENERGAN-DM) 6 25-15 mg/5 mL oral syrup    amoxicillin-clavulanate (Augmentin) 875-125 mg per tablet        Start abx and cough medication  If symptoms worsen call or come in       Reason for visit is   Chief Complaint   Patient presents with    Virtual Brief Visit    Virtual Regular Visit        Encounter provider Kayden Alvarado MD    Provider located at [place] [address]  [address] [place] [id number]      Recent Visits  No visits were found meeting these conditions  Showing recent visits within past 7 days and meeting all other requirements  Today's Visits  Date Type Provider Dept   10/03/22 [facility]   Showing today's visits and meeting all other requirements  Future Appointments  No visits were found meeting these conditions  Showing future appointments within next 150 days and meeting all other requirements       The patient was identified by name and date of birth  Mateolizette Whitfield was informed that this is a telemedicine visit and that the visit is being conducted through [facility] and patient was informed this is a secure, HIPAA-complaint platform  She agrees to proceed     My office door was closed  No one else was in the room  She acknowledged consent and understanding of privacy and security of the video platform  The patient has agreed to participate and understands they can discontinue the visit at any time  Patient is aware this is a billable service  Eboni Guerrero is a 44 y o  female    44year old states she has a cough for 5 days, states she has a h/o bronchitis   No fever, chills, body aches, SOB, mucus  She started taking Augmentin 1 dose so far and Promethazine DM  Took home COVID test - negative  Past Medical History:   Diagnosis Date    Abdominal hernia     Stress incontinence     Varicella     as child        Past Surgical History:   Procedure Laterality Date    COLPOSCOPY  2002    GYNECOLOGIC CRYOSURGERY  2002       Current Outpatient Medications   Medication Sig Dispense Refill    amoxicillin-clavulanate (Augmentin) 875-125 mg per tablet Take 1 tablet by mouth every 12 (twelve) hours for 10 days 20 tablet 0    Promethazine-DM (PHENERGAN-DM) 6 25-15 mg/5 mL oral syrup Take 5 mL by mouth 4 (four) times a day as needed for cough 240 mL 0     No current facility-administered medications for this visit  No Known Allergies    Review of Systems   Constitutional: Negative for chills and fever  HENT: Negative for congestion  Respiratory: Positive for cough  Negative for shortness of breath and wheezing  Video Exam    There were no vitals filed for this visit  Physical Exam  Vitals and nursing note reviewed  Constitutional:       General: She is not in acute distress  Appearance: She is well-developed  She is not diaphoretic  HENT:      Head: Normocephalic and atraumatic  Pulmonary:      Effort: Pulmonary effort is normal  No respiratory distress  Neurological:      Mental Status: She is alert  Psychiatric:         Behavior: Behavior normal          Thought Content:  Thought content normal          Judgment: Judgment normal           I spent 10 minutes directly with the patient during this visit

## 2023-09-19 ENCOUNTER — OFFICE VISIT (OUTPATIENT)
Dept: FAMILY MEDICINE CLINIC | Facility: CLINIC | Age: 38
End: 2023-09-19
Payer: COMMERCIAL

## 2023-09-19 ENCOUNTER — APPOINTMENT (OUTPATIENT)
Dept: RADIOLOGY | Facility: CLINIC | Age: 38
End: 2023-09-19
Payer: COMMERCIAL

## 2023-09-19 VITALS
HEIGHT: 63 IN | WEIGHT: 216 LBS | HEART RATE: 94 BPM | DIASTOLIC BLOOD PRESSURE: 96 MMHG | SYSTOLIC BLOOD PRESSURE: 142 MMHG | BODY MASS INDEX: 38.27 KG/M2 | OXYGEN SATURATION: 97 % | TEMPERATURE: 97.8 F

## 2023-09-19 DIAGNOSIS — Z00.00 ROUTINE ADULT HEALTH MAINTENANCE: ICD-10-CM

## 2023-09-19 DIAGNOSIS — R07.89 TIGHTNESS IN CHEST: ICD-10-CM

## 2023-09-19 DIAGNOSIS — Z13.29 SCREENING FOR THYROID DISORDER: ICD-10-CM

## 2023-09-19 DIAGNOSIS — R05.1 ACUTE COUGH: ICD-10-CM

## 2023-09-19 DIAGNOSIS — K52.9 CHRONIC DIARRHEA: ICD-10-CM

## 2023-09-19 DIAGNOSIS — Z13.220 SCREENING FOR LIPID DISORDERS: ICD-10-CM

## 2023-09-19 DIAGNOSIS — J06.9 VIRAL URI: Primary | ICD-10-CM

## 2023-09-19 PROCEDURE — 99214 OFFICE O/P EST MOD 30 MIN: CPT

## 2023-09-19 PROCEDURE — 71046 X-RAY EXAM CHEST 2 VIEWS: CPT

## 2023-09-19 RX ORDER — DEXTROMETHORPHAN HYDROBROMIDE AND PROMETHAZINE HYDROCHLORIDE 15; 6.25 MG/5ML; MG/5ML
5 SYRUP ORAL 4 TIMES DAILY PRN
Qty: 118 ML | Refills: 0 | Status: SHIPPED | OUTPATIENT
Start: 2023-09-19

## 2023-09-19 NOTE — PROGRESS NOTES
Name: Chasity Ruvalcaba      : 1985      MRN: [id number]  Encounter Provider: Prakash Kessler PA-C  Encounter Date: 2023   Encounter department: [address] [place] [address]     1. Viral URI    2. Acute cough  -     XR chest pa & lateral; Future; Expected date: 2023  -     promethazine-dextromethorphan (PHENERGAN-DM) 6.25-15 mg/5 mL oral syrup; Take 5 mL by mouth 4 (four) times a day as needed for cough    3. Tightness in chest  -     XR chest pa & lateral; Future; Expected date: 2023    4. Chronic diarrhea  -     Ambulatory Referral to Gastroenterology; Future    5. Routine adult health maintenance  -     CBC and differential; Future  -     Comprehensive metabolic panel; Future    6. Screening for lipid disorders  -     Lipid Panel with Direct LDL reflex; Future    7. Screening for thyroid disorder  -     TSH, 3rd generation with Free T4 reflex; Future    Patient presents for evaluation of cold symptoms since this past . She has been sick on and off for the last 2-3 months with minimal responsiveness to two different antibiotics. Her cough has been present throughout the entire 2-3 months and now she is experiencing tightness of the chest when she coughs. Therefore, ordered stat CXR to rule out underlying pathology behind the cough. Also prescribed phergan-DM cough syrup as patient stated this worked before in the past. Advised patient to continue supportive care - OTC tylenol/ibuprofen, antihistamine, and an OTC decongestant. Encouraged increased fluid intake and lots of rest. Did not want to start another antibiotic given patients new symptoms have only been going on for four days and are likely viral along with the fact that she has been on multiple antibiotics in the last few months. Patient has not had blood work completed in over a year - requested blood work to be completed, including thyroid because she "has a history of thyroid issue".  Put order in for CBC, CMP, TSH and a lipid panel. 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 Notes she has done two virtual visits with "Amira Gustafson MD" over the last few months and first was given azithromycin which did not work in alleviating her symptoms, so she was then given amoxicillin. She has been off all antibiotics for the last two weeks - she was initially feeling better after finishing the amoxicillin but this past Sunday started feeling unwell again. She feels like her cough never truly went away and has been present for 2-3 months. Since this past Sunday, she has also had worsening congestion, chest tightness, and a sore throat. She took a covid at-home test yesterday which was negative. Patient also notes she has been dealing with stomach issues "for years". States she has chronic diarrhea and "almost never" has a normal bowel movement. She has also been dealing with frequent intermittent vomiting. She has tried making changes to her diet and increasing fiber to harden her stool but was not successful. She feels she "has tried everything" and would like a referral to gastro. Review of Systems   Constitutional: Negative for appetite change, chills, diaphoresis, fatigue and fever. HENT: Positive for congestion and sore throat. Negative for sinus pressure and sinus pain. Respiratory: Positive for cough, chest tightness (when coughing) and shortness of breath (when coughing). Negative for wheezing. Cardiovascular: Negative for chest pain and palpitations. Gastrointestinal: Positive for diarrhea (chronic for years) and vomiting. Negative for abdominal pain, blood in stool, constipation and nausea. Neurological: Negative for dizziness, light-headedness and headaches.        Current Outpatient Medications on File Prior to Visit   Medication Sig   • [DISCONTINUED] Promethazine-DM (PHENERGAN-DM) 6.25-15 mg/5 mL oral syrup Take 5 mL by mouth 4 (four) times a day as needed for cough       Objective     /96   Pulse 94   Temp 97.8 °F (36.6 °C)   Ht 5' 3" (1.6 m)   Wt 98 kg (216 lb)   SpO2 97%   BMI 38.26 kg/m²     Physical Exam  Constitutional:       General: She is not in acute distress. Appearance: Normal appearance. She is not ill-appearing. HENT:      Head: Normocephalic and atraumatic. Right Ear: Tympanic membrane, ear canal and external ear normal. There is no impacted cerumen. Left Ear: Tympanic membrane, ear canal and external ear normal. There is no impacted cerumen. Nose: Congestion present. No rhinorrhea. Mouth/Throat:      Mouth: Mucous membranes are moist.      Pharynx: Oropharynx is clear. No oropharyngeal exudate or posterior oropharyngeal erythema. Eyes:      General:         Right eye: No discharge. Left eye: No discharge. Cardiovascular:      Rate and Rhythm: Normal rate and regular rhythm. Pulses: Normal pulses. Heart sounds: No murmur heard. No friction rub. Pulmonary:      Effort: Pulmonary effort is normal. No respiratory distress. Breath sounds: Normal breath sounds. No wheezing, rhonchi or rales. Abdominal:      General: Bowel sounds are normal.      Palpations: Abdomen is soft. Tenderness: There is no abdominal tenderness. Musculoskeletal:         General: Normal range of motion. Cervical back: Normal range of motion. Lymphadenopathy:      Cervical: No cervical adenopathy. Skin:     General: Skin is warm. Neurological:      Mental Status: She is alert and oriented to person, place, and time.        Luis Enrique Graham PA-C

## 2023-10-25 ENCOUNTER — APPOINTMENT (OUTPATIENT)
Dept: LAB | Facility: HOSPITAL | Age: 38
End: 2023-10-25
Payer: COMMERCIAL

## 2023-10-25 DIAGNOSIS — Z13.29 SCREENING FOR THYROID DISORDER: ICD-10-CM

## 2023-10-25 DIAGNOSIS — Z13.220 SCREENING FOR LIPID DISORDERS: ICD-10-CM

## 2023-10-25 DIAGNOSIS — Z00.00 ROUTINE ADULT HEALTH MAINTENANCE: ICD-10-CM

## 2023-10-25 LAB
ALBUMIN SERPL BCP-MCNC: 4.1 G/DL (ref 3.5–5)
ALP SERPL-CCNC: 51 U/L (ref 34–104)
ALT SERPL W P-5'-P-CCNC: 25 U/L (ref 7–52)
ANION GAP SERPL CALCULATED.3IONS-SCNC: 7 MMOL/L
AST SERPL W P-5'-P-CCNC: 19 U/L (ref 13–39)
BASOPHILS # BLD AUTO: 0.03 THOUSANDS/ÂΜL (ref 0–0.1)
BASOPHILS NFR BLD AUTO: 1 % (ref 0–1)
BILIRUB SERPL-MCNC: 0.4 MG/DL (ref 0.2–1)
BUN SERPL-MCNC: 17 MG/DL (ref 5–25)
CALCIUM SERPL-MCNC: 9.2 MG/DL (ref 8.4–10.2)
CHLORIDE SERPL-SCNC: 106 MMOL/L (ref 96–108)
CHOLEST SERPL-MCNC: 156 MG/DL
CO2 SERPL-SCNC: 24 MMOL/L (ref 21–32)
CREAT SERPL-MCNC: 0.88 MG/DL (ref 0.6–1.3)
EOSINOPHIL # BLD AUTO: 0.22 THOUSAND/ÂΜL (ref 0–0.61)
EOSINOPHIL NFR BLD AUTO: 7 % (ref 0–6)
ERYTHROCYTE [DISTWIDTH] IN BLOOD BY AUTOMATED COUNT: 14.3 % (ref 11.6–15.1)
GFR SERPL CREATININE-BSD FRML MDRD: 83 ML/MIN/1.73SQ M
GLUCOSE P FAST SERPL-MCNC: 102 MG/DL (ref 65–99)
HCT VFR BLD AUTO: 40.2 % (ref 34.8–46.1)
HDLC SERPL-MCNC: 48 MG/DL
HGB BLD-MCNC: 12.9 G/DL (ref 11.5–15.4)
IMM GRANULOCYTES # BLD AUTO: 0.02 THOUSAND/UL (ref 0–0.2)
IMM GRANULOCYTES NFR BLD AUTO: 1 % (ref 0–2)
LDLC SERPL CALC-MCNC: 77 MG/DL (ref 0–100)
LYMPHOCYTES # BLD AUTO: 1.14 THOUSANDS/ÂΜL (ref 0.6–4.47)
LYMPHOCYTES NFR BLD AUTO: 34 % (ref 14–44)
MCH RBC QN AUTO: 28.1 PG (ref 26.8–34.3)
MCHC RBC AUTO-ENTMCNC: 32.1 G/DL (ref 31.4–37.4)
MCV RBC AUTO: 88 FL (ref 82–98)
MONOCYTES # BLD AUTO: 0.45 THOUSAND/ÂΜL (ref 0.17–1.22)
MONOCYTES NFR BLD AUTO: 14 % (ref 4–12)
NEUTROPHILS # BLD AUTO: 1.46 THOUSANDS/ÂΜL (ref 1.85–7.62)
NEUTS SEG NFR BLD AUTO: 43 % (ref 43–75)
NRBC BLD AUTO-RTO: 0 /100 WBCS
PLATELET # BLD AUTO: 303 THOUSANDS/UL (ref 149–390)
PMV BLD AUTO: 10 FL (ref 8.9–12.7)
POTASSIUM SERPL-SCNC: 4.2 MMOL/L (ref 3.5–5.3)
PROT SERPL-MCNC: 7.5 G/DL (ref 6.4–8.4)
RBC # BLD AUTO: 4.59 MILLION/UL (ref 3.81–5.12)
SODIUM SERPL-SCNC: 137 MMOL/L (ref 135–147)
T4 FREE SERPL-MCNC: 0.68 NG/DL (ref 0.61–1.12)
TRIGL SERPL-MCNC: 155 MG/DL
TSH SERPL DL<=0.05 MIU/L-ACNC: 4.96 UIU/ML (ref 0.45–4.5)
WBC # BLD AUTO: 3.32 THOUSAND/UL (ref 4.31–10.16)

## 2023-10-25 PROCEDURE — 85025 COMPLETE CBC W/AUTO DIFF WBC: CPT

## 2023-10-25 PROCEDURE — 80061 LIPID PANEL: CPT

## 2023-10-25 PROCEDURE — 84443 ASSAY THYROID STIM HORMONE: CPT

## 2023-10-25 PROCEDURE — 84439 ASSAY OF FREE THYROXINE: CPT

## 2023-10-25 PROCEDURE — 36415 COLL VENOUS BLD VENIPUNCTURE: CPT

## 2023-10-25 PROCEDURE — 80053 COMPREHEN METABOLIC PANEL: CPT

## 2023-10-26 ENCOUNTER — OFFICE VISIT (OUTPATIENT)
Dept: GASTROENTEROLOGY | Facility: CLINIC | Age: 38
End: 2023-10-26
Payer: COMMERCIAL

## 2023-10-26 VITALS
HEART RATE: 92 BPM | SYSTOLIC BLOOD PRESSURE: 122 MMHG | BODY MASS INDEX: 37.21 KG/M2 | DIASTOLIC BLOOD PRESSURE: 78 MMHG | WEIGHT: 210 LBS | HEIGHT: 63 IN

## 2023-10-26 DIAGNOSIS — R05.3 CHRONIC COUGH: Primary | ICD-10-CM

## 2023-10-26 DIAGNOSIS — D72.819 LEUKOPENIA, UNSPECIFIED TYPE: Primary | ICD-10-CM

## 2023-10-26 DIAGNOSIS — R11.11 VOMITING WITHOUT NAUSEA, UNSPECIFIED VOMITING TYPE: ICD-10-CM

## 2023-10-26 DIAGNOSIS — R68.89 FREQUENTLY SICK: ICD-10-CM

## 2023-10-26 PROCEDURE — 99204 OFFICE O/P NEW MOD 45 MIN: CPT | Performed by: PHYSICIAN ASSISTANT

## 2023-10-26 RX ORDER — AMOXICILLIN 500 MG/1
500 CAPSULE ORAL EVERY 8 HOURS
COMMUNITY
Start: 2023-09-19

## 2023-10-26 RX ORDER — PANTOPRAZOLE SODIUM 40 MG/1
40 TABLET, DELAYED RELEASE ORAL DAILY
Qty: 30 TABLET | Refills: 1 | Status: SHIPPED | OUTPATIENT
Start: 2023-10-26

## 2023-10-26 RX ORDER — BENZONATATE 200 MG/1
CAPSULE ORAL
COMMUNITY
Start: 2023-09-19

## 2023-10-26 RX ORDER — PREDNISONE 20 MG/1
TABLET ORAL
COMMUNITY
Start: 2023-09-19

## 2023-10-26 NOTE — PROGRESS NOTES
[facility]  Niko Muniz 43 y.o. female MRN: [id number]       CC: Chronic cough and vomiting     HPI: Patty Tracey is a pleasant 43year old female who presents to the office for cough with vomiting. She denies heartburn or regurgitation. No dysphagia. Patient reports that cough and increased phlegm has been persistent despite antibiotic courses. Patient reports when she coughs "hard" she will vomit. She cannot pin point a certain time of day or whether or not food aggravates her symptoms. On previous imaging from an ED visit, she does have history of hiatal hernia but no prior EGD. Review of Systems:    CONSTITUTIONAL: Denies any fever, chills, or rigors. Good appetite, and no recent weight loss. HEENT: No earache or tinnitus. Denies hearing loss or visual disturbances. CARDIOVASCULAR: No chest pain or palpitations. RESPIRATORY: Denies any cough, hemoptysis, shortness of breath or dyspnea on exertion. GASTROINTESTINAL: As noted in the History of Present Illness. GENITOURINARY: No problems with urination. Denies any hematuria or dysuria. NEUROLOGIC: No dizziness or vertigo, denies headaches. MUSCULOSKELETAL: Denies any muscle or joint pain. SKIN: Denies skin rashes or itching. ENDOCRINE: Denies excessive thirst. Denies intolerance to heat or cold. PSYCHOSOCIAL: Denies depression or anxiety. Denies any recent memory loss.        Current Outpatient Medications   Medication Sig Dispense Refill    pantoprazole (PROTONIX) 40 mg tablet Take 1 tablet (40 mg total) by mouth daily 30 tablet 1    amoxicillin (AMOXIL) 500 mg capsule Take 500 mg by mouth every 8 (eight) hours (Patient not taking: Reported on 10/26/2023)      benzonatate (TESSALON) 200 MG capsule take 1 tablet by mouth three times a day if needed for cough (Patient not taking: Reported on 10/26/2023)      predniSONE 20 mg tablet take 2 tablets by mouth today then 2 tablets tomorrow then 1 tablet once daily for 2 days (Patient not taking: Reported on 10/26/2023)      promethazine-dextromethorphan (PHENERGAN-DM) 6.25-15 mg/5 mL oral syrup Take 5 mL by mouth 4 (four) times a day as needed for cough (Patient not taking: Reported on 10/26/2023) 118 mL 0     No current facility-administered medications for this visit. Past Medical History:   Diagnosis Date    Abdominal hernia     Stress incontinence     Varicella     as child      Past Surgical History:   Procedure Laterality Date    COLPOSCOPY  2002    GYNECOLOGIC CRYOSURGERY  2002     Social History     Socioeconomic History    Marital status: /Civil Union     Spouse name: None    Number of children: None    Years of education: None    Highest education level: None   Occupational History    None   Tobacco Use    Smoking status: Never    Smokeless tobacco: Never   Vaping Use    Vaping Use: Never used   Substance and Sexual Activity    Alcohol use:  Yes     Alcohol/week: 1.0 standard drink of alcohol     Types: 1 Standard drinks or equivalent per week    Drug use: Never    Sexual activity: Yes     Partners: Male     Birth control/protection: None     Comment: Ann    Other Topics Concern    None   Social History Narrative    None     Social Determinants of Health     Financial Resource Strain: Not on file   Food Insecurity: Not on file   Transportation Needs: Not on file   Physical Activity: Not on file   Stress: Not on file   Social Connections: Not on file   Intimate Partner Violence: Not on file   Housing Stability: Not on file     Family History   Problem Relation Age of Onset    Asthma Mother     Hypertension Father     Hyperlipidemia Father     Colon cancer Maternal Uncle     No Known Problems Sister     No Known Problems Brother     No Known Problems Son     Heart disease Maternal Grandmother     Alzheimer's disease Maternal Grandfather     No Known Problems Sister             PHYSICAL EXAM:    Vitals:    10/26/23 1445   BP: 122/78   BP Location: Left arm Patient Position: Sitting   Pulse: 92   Weight: 95.3 kg (210 lb)   Height: 5' 3" (1.6 m)     General Appearance:   Alert and oriented x 3. Cooperative, and in no respiratory distress   HEENT:   Normocephalic, atraumatic, anicteric. Neck:  Supple, symmetrical, trachea midline   Lungs:   Clear to auscultation bilaterally    Heart[de-identified]   Regular rate and rhythm   Abdomen:   Soft, non-tender, non-distended; normal bowel sounds; no masses, no organomegaly    Genitalia:   Deferred    Rectal:   Deferred    Extremities:  No cyanosis, clubbing or edema    Pulses:  2+ and symmetric all extremities    Skin:  Skin color, texture, turgor normal, no rashes or lesions    Lymph nodes:  No palpable cervical or supraclavicular lymphadenopathy        Lab Results:   Results from last 6 Months   Lab Units 10/25/23  0636   WBC Thousand/uL 3.32*   HEMOGLOBIN g/dL 12.9   HEMATOCRIT % 40.2   PLATELETS Thousands/uL 303   NEUTROS PCT % 43   LYMPHS PCT % 34   MONOS PCT % 14*   EOS PCT % 7*     Results from last 6 Months   Lab Units 10/25/23  0636   POTASSIUM mmol/L 4.2   CHLORIDE mmol/L 106   CO2 mmol/L 24   BUN mg/dL 17   CREATININE mg/dL 0.88   CALCIUM mg/dL 9.2   ALK PHOS U/L 51   ALT U/L 25   AST U/L 19               Imaging Studies:   US pelvis complete w transvaginal    Result Date: 2/14/2022  Impression:  1. Nonspecific heterogeneity of the uterine echotexture. There is now suggestion of a small exophytic fibroid on the left as noted above. 2.  Ovaries are unremarkable. Workstation performed: [id number]     US thyroid    Result Date: 2/14/2022  Impression: 1. Heterogeneous thyroid gland with small nodules identified. No nodule meets current ACR criteria for requiring biopsy or followup ultrasounds. Reference: ACR Thyroid Imaging, Reporting and Data System (TI-RADS): White Paper of the [employer].  J AM Ja Radiol 0140;38:[id number]. (additional recommendations based on American Thyroid Association 2015 guidelines.) Workstation performed: [id number]       ASSESSMENT and PLAN:      1) Chronic cough with vomiting - Per report, CXR stable from 1 year ago. She was been treated with cough medication and antibiotics without improvement. She is here to establish with GI to rule out underlying silent reflux as a cause. - Barium swallow  - PPI course  - If barium swallow is abnormal, will schedule EGD to investigate   - May benefit from referral to ENT   - She agrees with above plan      Follow up depending on barium swallow results. Portions of the record may have been created with voice recognition software. Occasional wrong word or "sound a like" substitutions may have occurred due to the inherent limitations of voice recognition software. Read the chart carefully and recognize, using context, where substitutions have occurred.

## 2023-10-27 ENCOUNTER — TELEPHONE (OUTPATIENT)
Dept: HEMATOLOGY ONCOLOGY | Facility: CLINIC | Age: 38
End: 2023-10-27

## 2023-10-27 NOTE — TELEPHONE ENCOUNTER
I called Bubba Ferris in response to a referral that was received for patient to establish care with Hematology. Outreach was made to schedule a consultation. I left a voicemail explaining the reason for my call and advised patient to call [facility] at [phone]. Another attempt will be made to contact patient.

## 2023-10-31 DIAGNOSIS — R79.89 ELEVATED TSH: ICD-10-CM

## 2023-10-31 DIAGNOSIS — R76.8 THYROID ANTIBODY POSITIVE: Primary | ICD-10-CM

## 2023-11-02 ENCOUNTER — TELEPHONE (OUTPATIENT)
Dept: HEMATOLOGY ONCOLOGY | Facility: CLINIC | Age: 38
End: 2023-11-02

## 2023-11-02 NOTE — TELEPHONE ENCOUNTER
I called Ju Peña in response to a referral that was received for patient to establish care with Hematology. Outreach was made to schedule a consultation. Patient declined scheduling. The referral has been closed.

## 2023-11-10 ENCOUNTER — HOSPITAL ENCOUNTER (OUTPATIENT)
Dept: RADIOLOGY | Facility: HOSPITAL | Age: 38
End: 2023-11-10
Payer: COMMERCIAL

## 2023-11-10 DIAGNOSIS — R05.3 CHRONIC COUGH: ICD-10-CM

## 2023-11-10 DIAGNOSIS — R11.11 VOMITING WITHOUT NAUSEA, UNSPECIFIED VOMITING TYPE: ICD-10-CM

## 2023-11-10 PROCEDURE — 74220 X-RAY XM ESOPHAGUS 1CNTRST: CPT

## 2023-11-13 ENCOUNTER — TELEPHONE (OUTPATIENT)
Dept: GASTROENTEROLOGY | Facility: CLINIC | Age: 38
End: 2023-11-13

## 2023-11-13 NOTE — TELEPHONE ENCOUNTER
Called and spoke with pt in regards to her Barium swallow results as per Ronnie Carreon. She needs to be Scheduled for EGD as per recommendations. Cheryl Turner-  please reach out to pt to schedule EGD, thank you.

## 2023-11-13 NOTE — TELEPHONE ENCOUNTER
----- Message from Sanchez Hendrickson PA-C sent at 11/12/2023  7:02 PM EST -----  Please let patient know that her test was normal. No findings to explain her symptoms. Would recommend an EGD. please forward to Audrey Smith afterwards to schedule.

## 2023-12-07 ENCOUNTER — CONSULT (OUTPATIENT)
Dept: ENDOCRINOLOGY | Facility: CLINIC | Age: 38
End: 2023-12-07
Payer: COMMERCIAL

## 2023-12-07 VITALS
HEART RATE: 89 BPM | TEMPERATURE: 97.5 F | SYSTOLIC BLOOD PRESSURE: 128 MMHG | WEIGHT: 216.8 LBS | DIASTOLIC BLOOD PRESSURE: 72 MMHG | OXYGEN SATURATION: 98 % | HEIGHT: 63 IN | BODY MASS INDEX: 38.41 KG/M2

## 2023-12-07 DIAGNOSIS — R79.89 ELEVATED TSH: ICD-10-CM

## 2023-12-07 DIAGNOSIS — R76.8 THYROID ANTIBODY POSITIVE: ICD-10-CM

## 2023-12-07 DIAGNOSIS — E03.8 SUBCLINICAL HYPOTHYROIDISM: Primary | ICD-10-CM

## 2023-12-07 PROCEDURE — 99204 OFFICE O/P NEW MOD 45 MIN: CPT | Performed by: STUDENT IN AN ORGANIZED HEALTH CARE EDUCATION/TRAINING PROGRAM

## 2023-12-07 RX ORDER — POLYMYXIN B SULFATE AND TRIMETHOPRIM 1; 10000 MG/ML; [USP'U]/ML
SOLUTION OPHTHALMIC
COMMUNITY
Start: 2023-12-02

## 2023-12-07 RX ORDER — LEVOTHYROXINE SODIUM 0.05 MG/1
50 TABLET ORAL DAILY
Qty: 90 TABLET | Refills: 0 | Status: SHIPPED | OUTPATIENT
Start: 2023-12-07 | End: 2024-03-06

## 2023-12-20 ENCOUNTER — OFFICE VISIT (OUTPATIENT)
Dept: FAMILY MEDICINE CLINIC | Facility: CLINIC | Age: 38
End: 2023-12-20
Payer: COMMERCIAL

## 2023-12-20 VITALS
BODY MASS INDEX: 38.62 KG/M2 | HEIGHT: 63 IN | HEART RATE: 98 BPM | TEMPERATURE: 96.3 F | OXYGEN SATURATION: 98 % | DIASTOLIC BLOOD PRESSURE: 80 MMHG | WEIGHT: 218 LBS | SYSTOLIC BLOOD PRESSURE: 126 MMHG

## 2023-12-20 DIAGNOSIS — R05.3 CHRONIC COUGH: Primary | ICD-10-CM

## 2023-12-20 PROCEDURE — 99213 OFFICE O/P EST LOW 20 MIN: CPT

## 2023-12-20 RX ORDER — DEXTROMETHORPHAN HYDROBROMIDE AND PROMETHAZINE HYDROCHLORIDE 15; 6.25 MG/5ML; MG/5ML
5 SYRUP ORAL 4 TIMES DAILY PRN
Qty: 120 ML | Refills: 0 | Status: SHIPPED | OUTPATIENT
Start: 2023-12-20

## 2023-12-20 NOTE — PROGRESS NOTES
Name: Ju Stacy      : 1985      MRN: [id number]  Encounter Provider: Carla Chester PA-C  Encounter Date: 2023   Encounter department: [facility]    Assessment & Plan     1. Chronic cough  -     Complete PFT with post bronchodilator; Future  -     CT chest wo contrast; Future; Expected date: 2023  -     promethazine-dextromethorphan (PHENERGAN-DM) 6.25-15 mg/5 mL oral syrup; Take 5 mL by mouth 4 (four) times a day as needed for cough    Patient presents for re-evaluation of chronic cough refractory to two different antibiotics and prednisone. Mild improvement with use of inhaler. Physical exam unremarkable; lungs clear b/l with O2 of 98% on room air. Therefore ordering PFTs to evaluate for underlying asthma/lung pathology and a CT scan of the chest for further evaluation as CXR was clear with stable unchanged scarring of the left lower lung field. Will make recommendations based on results of above testing. In the mean time, encouraged continued use of PRN albuterol inhaler and refilled promethazine DM for symptomatic relief.     Did discuss that acid reflux is another condition that can cause chronic cough - patient recently evaluated by GI and does not feel like it is due to that. However did advise she make sure to call them and schedule the EGD they recommended.     Patient to call with any questions or concerns. Advised ER if she experience any chest pain, sob, or trouble breathing.        Subjective      CC: follow up chronic cough     Patient presents for re-evaluation of a chronic cough she has had for about three months now. Was seen on 23 where she was continuing to have the cough after receiving two different antibiotics from [facility] (azithromycin and amoxicillin), both of which did not help the cough at all. At that visit a CXR was ordered and promethazine DM was given for the cough. The CXR was clear for any evidence of pneumonia - just showed  "\"Linear scarring left lower lung field, unchanged\". Patient notes the cough has continued for the last three months, has not gotten any worse or better has just stayed the same. Notes last week she used [facility] where they gave her an inhaler and prednisone for the cough. Patient notes she finished the full course of prednisone which did not help at all. She has been using the inhaler and feel like it helps the most more than anything else she has tried.     Of note, patient recently saw GI for the chronic cough to see if it was related to a stricture/reflux - a barium swallow was performed which showed no abnormalities. They recommended an EGD in the future.     Patient notes she is tired of this cough and at this point just wants to rule out everything. Also feels the promethazine DM given to her in September helped and is requesting more.       Review of Systems   Constitutional:  Negative for appetite change, chills, diaphoresis, fatigue and fever.   HENT:  Negative for congestion, rhinorrhea, sinus pressure, sinus pain and sore throat.    Respiratory:  Positive for cough. Negative for chest tightness, shortness of breath and wheezing.    Cardiovascular:  Negative for chest pain and palpitations.   Gastrointestinal:  Positive for vomiting (from coughing). Negative for abdominal pain and nausea.       Current Outpatient Medications on File Prior to Visit   Medication Sig    levothyroxine 50 mcg tablet Take 1 tablet (50 mcg total) by mouth daily    polymyxin b-trimethoprim (POLYTRIM) ophthalmic solution INSTILL 1 DROP INTO AFFECTED EYE(S) three times a day for 7 days    [DISCONTINUED] amoxicillin (AMOXIL) 500 mg capsule Take 500 mg by mouth every 8 (eight) hours (Patient not taking: Reported on 10/26/2023)    [DISCONTINUED] benzonatate (TESSALON) 200 MG capsule take 1 tablet by mouth three times a day if needed for cough (Patient not taking: Reported on 10/26/2023)    [DISCONTINUED] pantoprazole (PROTONIX) 40 mg " "tablet Take 1 tablet (40 mg total) by mouth daily    [DISCONTINUED] predniSONE 20 mg tablet take 2 tablets by mouth today then 2 tablets tomorrow then 1 tablet once daily for 2 days (Patient not taking: Reported on 10/26/2023)    [DISCONTINUED] promethazine-dextromethorphan (PHENERGAN-DM) 6.25-15 mg/5 mL oral syrup Take 5 mL by mouth 4 (four) times a day as needed for cough (Patient not taking: Reported on 10/26/2023)       Objective     /80 (BP Location: Left arm, Patient Position: Sitting, Cuff Size: Large)   Pulse 98   Temp (!) 96.3 °F (35.7 °C)   Ht 5' 3\" (1.6 m)   Wt 98.9 kg (218 lb)   SpO2 98%   BMI 38.62 kg/m²     Physical Exam  Vitals reviewed.   Constitutional:       General: She is not in acute distress.     Appearance: Normal appearance. She is not ill-appearing or diaphoretic.   HENT:      Head: Normocephalic and atraumatic.      Right Ear: Tympanic membrane, ear canal and external ear normal. There is no impacted cerumen.      Left Ear: Tympanic membrane, ear canal and external ear normal. There is no impacted cerumen.      Nose: Nose normal. No congestion or rhinorrhea.      Mouth/Throat:      Mouth: Mucous membranes are moist.      Pharynx: No oropharyngeal exudate or posterior oropharyngeal erythema.   Eyes:      General:         Right eye: No discharge.         Left eye: No discharge.      Conjunctiva/sclera: Conjunctivae normal.   Cardiovascular:      Rate and Rhythm: Normal rate and regular rhythm.      Pulses: Normal pulses.      Heart sounds: Normal heart sounds. No murmur heard.  Pulmonary:      Effort: Pulmonary effort is normal. No respiratory distress.      Breath sounds: Normal breath sounds. No wheezing, rhonchi or rales.   Musculoskeletal:         General: Normal range of motion.      Cervical back: Normal range of motion and neck supple.   Lymphadenopathy:      Cervical: No cervical adenopathy.   Skin:     General: Skin is warm.   Neurological:      General: No focal deficit " present.      Mental Status: She is alert.   Psychiatric:         Mood and Affect: Mood normal.       Carla Chester PA-C

## 2024-01-22 ENCOUNTER — HOSPITAL ENCOUNTER (OUTPATIENT)
Dept: PULMONOLOGY | Facility: HOSPITAL | Age: 39
Discharge: HOME | End: 2024-01-22
Payer: COMMERCIAL

## 2024-01-22 DIAGNOSIS — R05.3 CHRONIC COUGH: ICD-10-CM

## 2024-01-22 PROCEDURE — 94726 PLETHYSMOGRAPHY LUNG VOLUMES: CPT

## 2024-01-22 PROCEDURE — 94060 EVALUATION OF WHEEZING: CPT

## 2024-01-22 PROCEDURE — 94729 DIFFUSING CAPACITY: CPT | Performed by: INTERNAL MEDICINE

## 2024-01-22 PROCEDURE — 94729 DIFFUSING CAPACITY: CPT

## 2024-01-22 PROCEDURE — 94726 PLETHYSMOGRAPHY LUNG VOLUMES: CPT | Performed by: INTERNAL MEDICINE

## 2024-01-22 PROCEDURE — 94060 EVALUATION OF WHEEZING: CPT | Performed by: INTERNAL MEDICINE

## 2024-01-22 PROCEDURE — 94760 N-INVAS EAR/PLS OXIMETRY 1: CPT

## 2024-01-22 RX ORDER — ALBUTEROL SULFATE 2.5 MG/3ML
2.5 SOLUTION RESPIRATORY (INHALATION) ONCE
Status: COMPLETED | OUTPATIENT
Start: 2024-01-22 | End: 2024-01-22

## 2024-01-22 RX ADMIN — ALBUTEROL SULFATE 2.5 MG: 2.5 SOLUTION RESPIRATORY (INHALATION) at 07:43

## 2024-03-17 DIAGNOSIS — E03.8 SUBCLINICAL HYPOTHYROIDISM: ICD-10-CM

## 2024-03-18 RX ORDER — LEVOTHYROXINE SODIUM 0.05 MG/1
50 TABLET ORAL DAILY
Qty: 90 TABLET | Refills: 1 | Status: SHIPPED | OUTPATIENT
Start: 2024-03-18

## 2024-03-22 ENCOUNTER — APPOINTMENT (OUTPATIENT)
Dept: LAB | Facility: HOSPITAL | Age: 39
End: 2024-03-22
Attending: STUDENT IN AN ORGANIZED HEALTH CARE EDUCATION/TRAINING PROGRAM
Payer: COMMERCIAL

## 2024-03-22 DIAGNOSIS — E03.8 SUBCLINICAL HYPOTHYROIDISM: ICD-10-CM

## 2024-03-22 LAB
T4 FREE SERPL-MCNC: 0.81 NG/DL (ref 0.61–1.12)
TSH SERPL DL<=0.05 MIU/L-ACNC: 2.94 UIU/ML (ref 0.45–4.5)

## 2024-03-22 PROCEDURE — 84443 ASSAY THYROID STIM HORMONE: CPT

## 2024-03-22 PROCEDURE — 36415 COLL VENOUS BLD VENIPUNCTURE: CPT

## 2024-03-22 PROCEDURE — 84439 ASSAY OF FREE THYROXINE: CPT

## 2024-04-22 ENCOUNTER — NURSE TRIAGE (OUTPATIENT)
Age: 39
End: 2024-04-22

## 2024-04-22 ENCOUNTER — NURSE TRIAGE (OUTPATIENT)
Dept: OTHER | Facility: OTHER | Age: 39
End: 2024-04-22

## 2024-04-22 NOTE — TELEPHONE ENCOUNTER
"Reason for Disposition   [1] Constant abdominal pain AND [2] present > 2 hours    Answer Assessment - Initial Assessment Questions  1. DIARRHEA SEVERITY: \"How bad is the diarrhea?\" \"How many extra stools have you had in the past 24 hours than normal?\"     - NO DIARRHEA (SCALE 0)    - MILD (SCALE 1-3): Few loose or mushy BMs; increase of 1-3 stools over normal daily number of stools; mild increase in ostomy output.    -  MODERATE (SCALE 4-7): Increase of 4-6 stools daily over normal; moderate increase in ostomy output.  * SEVERE (SCALE 8-10; OR 'WORST POSSIBLE'): Increase of 7 or more stools daily over normal; moderate increase in ostomy output; incontinence.      6 times /day  2. ONSET: \"When did the diarrhea begin?\"       One week ago  3. BM CONSISTENCY: \"How loose or watery is the diarrhea?\"       Stool with blood first last Monday and since then watery diarrhea with no stool  4. VOMITING: \"Are you also vomiting?\" If Yes, ask: \"How many times in the past 24 hours?\"       Denies  5. ABDOMINAL PAIN: \"Are you having any abdominal pain?\" If Yes, ask: \"What does it feel like?\" (e.g., crampy, dull, intermittent, constant)       LLQ  6. ABDOMINAL PAIN SEVERITY: If present, ask: \"How bad is the pain?\"  (e.g., Scale 1-10; mild, moderate, or severe)    - MILD (1-3): doesn't interfere with normal activities, abdomen soft and not tender to touch     - MODERATE (4-7): interferes with normal activities or awakens from sleep, tender to touch     - SEVERE (8-10): excruciating pain, doubled over, unable to do any normal activities        Moderate- 7/10  7. ORAL INTAKE: If vomiting, \"Have you been able to drink liquids?\" \"How much fluids have you had in the past 24 hours?\"      Drinking a lot of water but any food she eats runs right out of her  8. HYDRATION: \"Any signs of dehydration?\" (e.g., dry mouth [not just dry lips], too weak to stand, dizziness, new weight loss) \"When did you last urinate?\"      On and off feels like her " "vertigo is starting up and then it goes away  9. EXPOSURE: \"Have you traveled to a foreign country recently?\" \"Have you been exposed to anyone with diarrhea?\" \"Could you have eaten any food that was spoiled?\"      Denies  10. ANTIBIOTIC USE: \"Are you taking antibiotics now or have you taken antibiotics in the past 2 months?\"        Denies     Thyroid med started 3 months ago  11. OTHER SYMPTOMS: \"Do you have any other symptoms?\" (e.g., fever, blood in stool)        Blood in diarrhea once last Monday. No fever.  12. PREGNANCY: \"Is there any chance you are pregnant?\" \"When was your last menstrual period?\"        LMP: 4/15/2024    Protocols used: Diarrhea-ADULT-    "

## 2024-04-22 NOTE — TELEPHONE ENCOUNTER
Regarding: sooner appt/ blood in stool  ----- Message from Kim Rod sent at 4/22/2024  2:32 PM EDT -----  Patients GI provider:  PA: Jeni Handy     Number to return call: [phone]     Reason for call: Pt calling stating she has blood in stool and looking to speak with someone to discuss sooner appt     Scheduled procedure/appointment date if applicable: Apt/procedure

## 2024-04-22 NOTE — TELEPHONE ENCOUNTER
Patient has been having this problem but feels it's time to have more testing done. She does not want to go to the ER stating that she needs to go to work today until 1500. Please call her back with an appointment after three at [facility]. If at that time you feel she needs to go to Er she will then.

## 2024-04-22 NOTE — TELEPHONE ENCOUNTER
Patient called and requested a call back to triage symptoms, I did schedule an appointment with ELAINE garibay for Thursday at 8:00am she did say she wanted  to see anyone, she still asked to be call in case we can get her a sooner appt or maybe order some testing please reach out  thank you

## 2024-04-22 NOTE — TELEPHONE ENCOUNTER
"Pt c/o of seeing BRB mixed with stool x1 last Monday, cramping in lower abdominal under umbilicus with diarrhea. Advised fiber supplement which pt is agreeable,and if has prep H can use as well. Appt scheduled for 4/25.      Reason for Disposition   MILD rectal bleeding (more than just a few drops or streaks)    Answer Assessment - Initial Assessment Questions  1. APPEARANCE of BLOOD: \"What color is it?\" \"Is it passed separately, on the surface of the stool, or mixed in with the stool?\"       BRB mixed with stool  2. AMOUNT: \"How much blood was passed?\"       Good amount  3. FREQUENCY: \"How many times has blood been passed with the stools?\"       One time  4. ONSET: \"When was the blood first seen in the stools?\" (Days or weeks)       Week ago  5. DIARRHEA: \"Is there also some diarrhea?\" If Yes, ask: \"How many diarrhea stools were passed in past 24 hours?\"       6 times today  6. CONSTIPATION: \"Do you have constipation?\" If Yes, ask: \"How bad is it?\"      denies  7. RECURRENT SYMPTOMS: \"Have you had blood in your stools before?\" If Yes, ask: \"When was the last time?\" and \"What happened that time?\"       denies  8. BLOOD THINNERS: \"Do you take any blood thinners?\" (e.g., Coumadin/warfarin, Pradaxa/dabigatran, aspirin)      denies  9. OTHER SYMPTOMS: \"Do you have any other symptoms?\"  (e.g., abdominal pain, vomiting, dizziness, fever)      denies  10. PREGNANCY: \"Is there any chance you are pregnant?\" \"When was your last menstrual period?\"        denies    Protocols used: Rectal Bleeding-ADULT-[place]    "

## 2024-04-22 NOTE — TELEPHONE ENCOUNTER
Called patient and got VM . GAVINOM for patient to return call to office. Also left message that if she is still not feeling well as per RN message earlier she should go to ER as there are no appointments today. Left office phone #

## 2024-04-22 NOTE — TELEPHONE ENCOUNTER
Called pt to triage, she was asking for an appt today, advised I needed to ask questions then would be able to make recommendations and send to provider. She wanted to go ER. I let her know to please call us back if she still wanted our assistance.

## 2024-04-25 ENCOUNTER — OFFICE VISIT (OUTPATIENT)
Age: 39
End: 2024-04-25
Payer: COMMERCIAL

## 2024-04-25 ENCOUNTER — TELEPHONE (OUTPATIENT)
Age: 39
End: 2024-04-25

## 2024-04-25 ENCOUNTER — PREP FOR PROCEDURE (OUTPATIENT)
Age: 39
End: 2024-04-25

## 2024-04-25 VITALS
SYSTOLIC BLOOD PRESSURE: 118 MMHG | DIASTOLIC BLOOD PRESSURE: 78 MMHG | WEIGHT: 213.8 LBS | OXYGEN SATURATION: 97 % | BODY MASS INDEX: 37.88 KG/M2 | HEIGHT: 63 IN | RESPIRATION RATE: 16 BRPM | HEART RATE: 81 BPM

## 2024-04-25 DIAGNOSIS — K62.5 RECTAL BLEEDING: ICD-10-CM

## 2024-04-25 DIAGNOSIS — R63.4 WEIGHT LOSS, ABNORMAL: ICD-10-CM

## 2024-04-25 DIAGNOSIS — R19.7 DIARRHEA, UNSPECIFIED TYPE: Primary | ICD-10-CM

## 2024-04-25 DIAGNOSIS — R10.30 LOWER ABDOMINAL PAIN: ICD-10-CM

## 2024-04-25 PROCEDURE — 99204 OFFICE O/P NEW MOD 45 MIN: CPT | Performed by: PHYSICIAN ASSISTANT

## 2024-04-25 RX ORDER — DICYCLOMINE HCL 20 MG
20 TABLET ORAL 3 TIMES DAILY PRN
Qty: 90 TABLET | Refills: 3 | Status: SHIPPED | OUTPATIENT
Start: 2024-04-25 | End: 2024-08-23

## 2024-04-25 RX ORDER — PSEUDOEPHEDRINE HCL 120 MG/1
TABLET, FILM COATED, EXTENDED RELEASE ORAL
COMMUNITY
Start: 2024-03-13 | End: 2024-05-02 | Stop reason: HOSPADM

## 2024-04-25 RX ORDER — AZELASTINE HYDROCHLORIDE 137 UG/1
SPRAY, METERED NASAL
COMMUNITY
Start: 2024-03-13 | End: 2024-05-02 | Stop reason: HOSPADM

## 2024-04-25 NOTE — H&P (VIEW-ONLY)
[facility] - Outpatient Follow-up Note  Ju Stacy 39 y.o. female MRN: [id number]  Encounter: [id number]          ASSESSMENT AND PLAN:      1. Diarrhea  2. Lower abdominal pain  3. Rectal bleeding  4. Weight loss    Patient presents for an evaluation of ongoing diarrhea and abdominal pain and bloating since September.  She reports recent worsening of her diarrhea and nocturnal symptoms.  She has had rectal bleeding and a weight loss of about 10 lbs.    Will plan for colonoscopy with visualization of the terminal ileum and biopsies to investigate.  Will check CBC, CMP, CRP, and celiac serology. Will check stool cultures for enteric pathogens, c diff, giardia, O&P, and fecal calprotectin.  Will check a CT Scan A/P to investigate.  Trial of a low FODMAP diet (information sheet provided).  Bentyl 20mg po TID prn abdominal pain.  Follow up after the above testing.    ______________________________________________________________________    SUBJECTIVE:  Patient is a pleasant 39 year old female who presents to the office for a gastrointestinal evaluation.  She reports of ongoing diarrhea and lower abdominal pain and bloating since September. She reports recent worsening of her diarrhea and nocturnal symptoms.  She has had rectal bleeding and a weight loss of about 10 lbs. She has never had a colonoscopy.  No recent travel out of the country or recent antibiotics.  No prior colonoscopy.  She reports an uncle with colon cancer.  No family history of UC/crohn's.  She reports a history of abdominal hernias. She had a normal TSH level checked last month.      REVIEW OF SYSTEMS IS OTHERWISE NEGATIVE.      Historical Information   Past Medical History:   Diagnosis Date    Abdominal hernia     Stress incontinence     Varicella     as child      Past Surgical History:   Procedure Laterality Date    COLPOSCOPY  2002    GYNECOLOGIC CRYOSURGERY  2002     Social History   Social History  "    Substance and Sexual Activity   Alcohol Use Yes    Alcohol/week: 1.0 standard drink of alcohol    Types: 1 Standard drinks or equivalent per week     Social History     Substance and Sexual Activity   Drug Use Never     Social History     Tobacco Use   Smoking Status Never   Smokeless Tobacco Never     Family History   Problem Relation Age of Onset    Asthma Mother     Hypertension Father     Hyperlipidemia Father     Colon cancer Maternal Uncle     No Known Problems Sister     No Known Problems Brother     No Known Problems Son     Heart disease Maternal Grandmother     Alzheimer's disease Maternal Grandfather     No Known Problems Sister        Meds/Allergies       Current Outpatient Medications:     dicyclomine (BENTYL) 20 mg tablet    levothyroxine 50 mcg tablet    Azelastine HCl 137 MCG/SPRAY SOLN    polymyxin b-trimethoprim (POLYTRIM) ophthalmic solution    promethazine-dextromethorphan (PHENERGAN-DM) 6.25-15 mg/5 mL oral syrup    pseudoephedrine (SUDAFED) 120 MG 12 hr tablet    No Known Allergies        Objective     Blood pressure 118/78, pulse 81, resp. rate 16, height 5' 3\" (1.6 m), weight 97 kg (213 lb 12.8 oz), SpO2 97%, not currently breastfeeding. Body mass index is 37.87 kg/m².      PHYSICAL EXAM:      General Appearance:   Alert, cooperative, no distress   HEENT:   Normocephalic, atraumatic, anicteric     Neck:  Supple, symmetrical, trachea midline   Lungs:   Clear to auscultation bilaterally; no rales, rhonchi or wheezing; respirations unlabored    Heart::   Regular rate and rhythm; no murmur, rub, or gallop.   Abdomen:   Soft, mild lower TTP, non-distended; normal bowel sounds   Genitalia:   Deferred    Rectal:   Deferred    Extremities:  No cyanosis, clubbing or edema    Pulses:  2+ and symmetric    Skin:  No jaundice, rashes, or lesions    Lymph nodes:  No palpable cervical lymphadenopathy        Lab Results:   No visits with results within 1 Day(s) from this visit.   Latest known visit with " results is:   Appointment on 03/22/2024   Component Date Value    Free T4 03/22/2024 0.81     TSH 3RD GENERATON 03/22/2024 2.943          Radiology Results:   No results found.

## 2024-04-25 NOTE — PATIENT INSTRUCTIONS
Scheduled date of colonoscopy (as of today): 5/10/24  Physician performing colonoscopy: Thom  Location of colonoscopy: [place]  Bowel prep reviewed with patient: Miralax  Instructions reviewed with patient by: Aimee UREÑA  Clearances:

## 2024-04-25 NOTE — PROGRESS NOTES
[facility] - Outpatient Follow-up Note  Ju Stacy 39 y.o. female MRN: [id number]  Encounter: [id number]          ASSESSMENT AND PLAN:      1. Diarrhea  2. Lower abdominal pain  3. Rectal bleeding  4. Weight loss    Patient presents for an evaluation of ongoing diarrhea and abdominal pain and bloating since September.  She reports recent worsening of her diarrhea and nocturnal symptoms.  She has had rectal bleeding and a weight loss of about 10 lbs.    Will plan for colonoscopy with visualization of the terminal ileum and biopsies to investigate.  Will check CBC, CMP, CRP, and celiac serology. Will check stool cultures for enteric pathogens, c diff, giardia, O&P, and fecal calprotectin.  Will check a CT Scan A/P to investigate.  Trial of a low FODMAP diet (information sheet provided).  Bentyl 20mg po TID prn abdominal pain.  Follow up after the above testing.    ______________________________________________________________________    SUBJECTIVE:  Patient is a pleasant 39 year old female who presents to the office for a gastrointestinal evaluation.  She reports of ongoing diarrhea and lower abdominal pain and bloating since September. She reports recent worsening of her diarrhea and nocturnal symptoms.  She has had rectal bleeding and a weight loss of about 10 lbs. She has never had a colonoscopy.  No recent travel out of the country or recent antibiotics.  No prior colonoscopy.  She reports an uncle with colon cancer.  No family history of UC/crohn's.  She reports a history of abdominal hernias. She had a normal TSH level checked last month.      REVIEW OF SYSTEMS IS OTHERWISE NEGATIVE.      Historical Information   Past Medical History:   Diagnosis Date    Abdominal hernia     Stress incontinence     Varicella     as child      Past Surgical History:   Procedure Laterality Date    COLPOSCOPY  2002    GYNECOLOGIC CRYOSURGERY  2002     Social History   Social History  "    Substance and Sexual Activity   Alcohol Use Yes    Alcohol/week: 1.0 standard drink of alcohol    Types: 1 Standard drinks or equivalent per week     Social History     Substance and Sexual Activity   Drug Use Never     Social History     Tobacco Use   Smoking Status Never   Smokeless Tobacco Never     Family History   Problem Relation Age of Onset    Asthma Mother     Hypertension Father     Hyperlipidemia Father     Colon cancer Maternal Uncle     No Known Problems Sister     No Known Problems Brother     No Known Problems Son     Heart disease Maternal Grandmother     Alzheimer's disease Maternal Grandfather     No Known Problems Sister        Meds/Allergies       Current Outpatient Medications:     dicyclomine (BENTYL) 20 mg tablet    levothyroxine 50 mcg tablet    Azelastine HCl 137 MCG/SPRAY SOLN    polymyxin b-trimethoprim (POLYTRIM) ophthalmic solution    promethazine-dextromethorphan (PHENERGAN-DM) 6.25-15 mg/5 mL oral syrup    pseudoephedrine (SUDAFED) 120 MG 12 hr tablet    No Known Allergies        Objective     Blood pressure 118/78, pulse 81, resp. rate 16, height 5' 3\" (1.6 m), weight 97 kg (213 lb 12.8 oz), SpO2 97%, not currently breastfeeding. Body mass index is 37.87 kg/m².      PHYSICAL EXAM:      General Appearance:   Alert, cooperative, no distress   HEENT:   Normocephalic, atraumatic, anicteric     Neck:  Supple, symmetrical, trachea midline   Lungs:   Clear to auscultation bilaterally; no rales, rhonchi or wheezing; respirations unlabored    Heart::   Regular rate and rhythm; no murmur, rub, or gallop.   Abdomen:   Soft, mild lower TTP, non-distended; normal bowel sounds   Genitalia:   Deferred    Rectal:   Deferred    Extremities:  No cyanosis, clubbing or edema    Pulses:  2+ and symmetric    Skin:  No jaundice, rashes, or lesions    Lymph nodes:  No palpable cervical lymphadenopathy        Lab Results:   No visits with results within 1 Day(s) from this visit.   Latest known visit with " results is:   Appointment on 03/22/2024   Component Date Value    Free T4 03/22/2024 0.81     TSH 3RD GENERATON 03/22/2024 2.943          Radiology Results:   No results found.

## 2024-04-25 NOTE — TELEPHONE ENCOUNTER
Pt calling to reschedule the scopy she booked in office, she says she was told 04.30.24 or 05.02 was available but I did not see the 30th as an available date and the 2nd works best for her

## 2024-04-29 ENCOUNTER — APPOINTMENT (OUTPATIENT)
Dept: LAB | Facility: HOSPITAL | Age: 39
End: 2024-04-29
Payer: COMMERCIAL

## 2024-04-29 DIAGNOSIS — R10.30 LOWER ABDOMINAL PAIN: ICD-10-CM

## 2024-04-29 DIAGNOSIS — R19.7 DIARRHEA, UNSPECIFIED TYPE: ICD-10-CM

## 2024-04-29 LAB
ALBUMIN SERPL BCP-MCNC: 3.9 G/DL (ref 3.5–5)
ALP SERPL-CCNC: 63 U/L (ref 34–104)
ALT SERPL W P-5'-P-CCNC: 35 U/L (ref 7–52)
ANION GAP SERPL CALCULATED.3IONS-SCNC: 8 MMOL/L (ref 4–13)
AST SERPL W P-5'-P-CCNC: 21 U/L (ref 13–39)
BILIRUB SERPL-MCNC: 0.38 MG/DL (ref 0.2–1)
BUN SERPL-MCNC: 11 MG/DL (ref 5–25)
CALCIUM SERPL-MCNC: 9 MG/DL (ref 8.4–10.2)
CHLORIDE SERPL-SCNC: 106 MMOL/L (ref 96–108)
CO2 SERPL-SCNC: 25 MMOL/L (ref 21–32)
CREAT SERPL-MCNC: 1.03 MG/DL (ref 0.6–1.3)
CRP SERPL QL: 9.1 MG/L
ERYTHROCYTE [DISTWIDTH] IN BLOOD BY AUTOMATED COUNT: 14 % (ref 11.6–15.1)
GFR SERPL CREATININE-BSD FRML MDRD: 68 ML/MIN/1.73SQ M
GLUCOSE P FAST SERPL-MCNC: 97 MG/DL (ref 65–99)
HCT VFR BLD AUTO: 42.6 % (ref 34.8–46.1)
HGB BLD-MCNC: 13.4 G/DL (ref 11.5–15.4)
IGA SERPL-MCNC: 236 MG/DL (ref 66–433)
MCH RBC QN AUTO: 27.5 PG (ref 26.8–34.3)
MCHC RBC AUTO-ENTMCNC: 31.5 G/DL (ref 31.4–37.4)
MCV RBC AUTO: 88 FL (ref 82–98)
PLATELET # BLD AUTO: 351 THOUSANDS/UL (ref 149–390)
PMV BLD AUTO: 9.5 FL (ref 8.9–12.7)
POTASSIUM SERPL-SCNC: 3.5 MMOL/L (ref 3.5–5.3)
PROT SERPL-MCNC: 6.9 G/DL (ref 6.4–8.4)
RBC # BLD AUTO: 4.87 MILLION/UL (ref 3.81–5.12)
SODIUM SERPL-SCNC: 139 MMOL/L (ref 135–147)
WBC # BLD AUTO: 6.64 THOUSAND/UL (ref 4.31–10.16)

## 2024-04-29 PROCEDURE — 36415 COLL VENOUS BLD VENIPUNCTURE: CPT

## 2024-04-29 PROCEDURE — 86364 TISS TRNSGLTMNASE EA IG CLAS: CPT

## 2024-04-29 PROCEDURE — 82784 ASSAY IGA/IGD/IGG/IGM EACH: CPT

## 2024-04-29 PROCEDURE — 86140 C-REACTIVE PROTEIN: CPT

## 2024-04-29 PROCEDURE — 85027 COMPLETE CBC AUTOMATED: CPT

## 2024-04-29 PROCEDURE — 80053 COMPREHEN METABOLIC PANEL: CPT

## 2024-04-30 ENCOUNTER — APPOINTMENT (OUTPATIENT)
Dept: LAB | Facility: HOSPITAL | Age: 39
End: 2024-04-30
Payer: COMMERCIAL

## 2024-04-30 DIAGNOSIS — R19.7 DIARRHEA, UNSPECIFIED TYPE: ICD-10-CM

## 2024-04-30 DIAGNOSIS — R10.30 LOWER ABDOMINAL PAIN: ICD-10-CM

## 2024-04-30 LAB
C COLI+JEJUNI TUF STL QL NAA+PROBE: NEGATIVE
C DIFF TOX GENS STL QL NAA+PROBE: NEGATIVE
EC STX1+STX2 GENES STL QL NAA+PROBE: NEGATIVE
SALMONELLA SP SPAO STL QL NAA+PROBE: NEGATIVE
SHIGELLA SP+EIEC IPAH STL QL NAA+PROBE: NEGATIVE

## 2024-04-30 PROCEDURE — 87209 SMEAR COMPLEX STAIN: CPT

## 2024-04-30 PROCEDURE — 87177 OVA AND PARASITES SMEARS: CPT

## 2024-04-30 PROCEDURE — 87505 NFCT AGENT DETECTION GI: CPT

## 2024-04-30 PROCEDURE — 83993 ASSAY FOR CALPROTECTIN FECAL: CPT

## 2024-05-01 LAB — TTG IGA SER-ACNC: <2 U/ML (ref 0–3)

## 2024-05-02 ENCOUNTER — ANESTHESIA EVENT (OUTPATIENT)
Dept: GASTROENTEROLOGY | Facility: HOSPITAL | Age: 39
End: 2024-05-02

## 2024-05-02 ENCOUNTER — HOSPITAL ENCOUNTER (OUTPATIENT)
Dept: GASTROENTEROLOGY | Facility: HOSPITAL | Age: 39
Setting detail: OUTPATIENT SURGERY
Discharge: HOME | End: 2024-05-02
Admitting: INTERNAL MEDICINE
Payer: COMMERCIAL

## 2024-05-02 ENCOUNTER — ANESTHESIA (OUTPATIENT)
Dept: GASTROENTEROLOGY | Facility: HOSPITAL | Age: 39
End: 2024-05-02

## 2024-05-02 ENCOUNTER — LAB (OUTPATIENT)
Dept: LAB | Facility: HOSPITAL | Age: 39
End: 2024-05-02
Payer: COMMERCIAL

## 2024-05-02 VITALS
OXYGEN SATURATION: 98 % | DIASTOLIC BLOOD PRESSURE: 91 MMHG | TEMPERATURE: 97.7 F | RESPIRATION RATE: 20 BRPM | BODY MASS INDEX: 37.27 KG/M2 | WEIGHT: 210.32 LBS | HEART RATE: 67 BPM | HEIGHT: 63 IN | SYSTOLIC BLOOD PRESSURE: 132 MMHG

## 2024-05-02 DIAGNOSIS — R19.7 DIARRHEA, UNSPECIFIED TYPE: ICD-10-CM

## 2024-05-02 DIAGNOSIS — K50.00 CROHN'S DISEASE OF SMALL INTESTINE WITHOUT COMPLICATION (HCC): Primary | ICD-10-CM

## 2024-05-02 DIAGNOSIS — R10.30 LOWER ABDOMINAL PAIN: ICD-10-CM

## 2024-05-02 DIAGNOSIS — K50.00 CROHN'S DISEASE OF SMALL INTESTINE WITHOUT COMPLICATION (HCC): ICD-10-CM

## 2024-05-02 LAB
ALBUMIN SERPL BCP-MCNC: 4.1 G/DL (ref 3.5–5)
ALP SERPL-CCNC: 61 U/L (ref 34–104)
ALT SERPL W P-5'-P-CCNC: 34 U/L (ref 7–52)
ANION GAP SERPL CALCULATED.3IONS-SCNC: 8 MMOL/L (ref 4–13)
AST SERPL W P-5'-P-CCNC: 25 U/L (ref 13–39)
BILIRUB SERPL-MCNC: 0.39 MG/DL (ref 0.2–1)
BUN SERPL-MCNC: 10 MG/DL (ref 5–25)
CALCIUM SERPL-MCNC: 9.5 MG/DL (ref 8.4–10.2)
CHLORIDE SERPL-SCNC: 106 MMOL/L (ref 96–108)
CO2 SERPL-SCNC: 27 MMOL/L (ref 21–32)
CREAT SERPL-MCNC: 0.98 MG/DL (ref 0.6–1.3)
CRP SERPL QL: 10.5 MG/L
ERYTHROCYTE [DISTWIDTH] IN BLOOD BY AUTOMATED COUNT: 14.1 % (ref 11.6–15.1)
ERYTHROCYTE [SEDIMENTATION RATE] IN BLOOD: 36 MM/HOUR (ref 0–19)
EXT PREGNANCY TEST URINE: NEGATIVE
EXT. CONTROL: NORMAL
GFR SERPL CREATININE-BSD FRML MDRD: 72 ML/MIN/1.73SQ M
GLUCOSE P FAST SERPL-MCNC: 75 MG/DL (ref 65–99)
HCT VFR BLD AUTO: 42.6 % (ref 34.8–46.1)
HGB BLD-MCNC: 13.4 G/DL (ref 11.5–15.4)
MCH RBC QN AUTO: 27.7 PG (ref 26.8–34.3)
MCHC RBC AUTO-ENTMCNC: 31.5 G/DL (ref 31.4–37.4)
MCV RBC AUTO: 88 FL (ref 82–98)
PLATELET # BLD AUTO: 345 THOUSANDS/UL (ref 149–390)
PMV BLD AUTO: 10.1 FL (ref 8.9–12.7)
POTASSIUM SERPL-SCNC: 3.6 MMOL/L (ref 3.5–5.3)
PROT SERPL-MCNC: 7.6 G/DL (ref 6.4–8.4)
RBC # BLD AUTO: 4.84 MILLION/UL (ref 3.81–5.12)
SODIUM SERPL-SCNC: 141 MMOL/L (ref 135–147)
WBC # BLD AUTO: 8.1 THOUSAND/UL (ref 4.31–10.16)

## 2024-05-02 PROCEDURE — 85027 COMPLETE CBC AUTOMATED: CPT

## 2024-05-02 PROCEDURE — 86140 C-REACTIVE PROTEIN: CPT

## 2024-05-02 PROCEDURE — 87340 HEPATITIS B SURFACE AG IA: CPT

## 2024-05-02 PROCEDURE — 45380 COLONOSCOPY AND BIOPSY: CPT | Performed by: INTERNAL MEDICINE

## 2024-05-02 PROCEDURE — 86705 HEP B CORE ANTIBODY IGM: CPT

## 2024-05-02 PROCEDURE — 86803 HEPATITIS C AB TEST: CPT

## 2024-05-02 PROCEDURE — 86704 HEP B CORE ANTIBODY TOTAL: CPT

## 2024-05-02 PROCEDURE — 81025 URINE PREGNANCY TEST: CPT | Performed by: ANESTHESIOLOGY

## 2024-05-02 PROCEDURE — 85652 RBC SED RATE AUTOMATED: CPT

## 2024-05-02 PROCEDURE — 88305 TISSUE EXAM BY PATHOLOGIST: CPT | Performed by: PATHOLOGY

## 2024-05-02 PROCEDURE — 80053 COMPREHEN METABOLIC PANEL: CPT

## 2024-05-02 PROCEDURE — 86480 TB TEST CELL IMMUN MEASURE: CPT

## 2024-05-02 PROCEDURE — 36415 COLL VENOUS BLD VENIPUNCTURE: CPT

## 2024-05-02 RX ORDER — LIDOCAINE HYDROCHLORIDE 20 MG/ML
INJECTION, SOLUTION EPIDURAL; INFILTRATION; INTRACAUDAL; PERINEURAL AS NEEDED
Status: DISCONTINUED | OUTPATIENT
Start: 2024-05-02 | End: 2024-05-02

## 2024-05-02 RX ORDER — SODIUM CHLORIDE, SODIUM LACTATE, POTASSIUM CHLORIDE, CALCIUM CHLORIDE 600; 310; 30; 20 MG/100ML; MG/100ML; MG/100ML; MG/100ML
INJECTION, SOLUTION INTRAVENOUS CONTINUOUS PRN
Status: DISCONTINUED | OUTPATIENT
Start: 2024-05-02 | End: 2024-05-02

## 2024-05-02 RX ORDER — BUDESONIDE 3 MG/1
9 CAPSULE, COATED PELLETS ORAL DAILY
Qty: 120 CAPSULE | Refills: 3 | Status: SHIPPED | OUTPATIENT
Start: 2024-05-02

## 2024-05-02 RX ORDER — PROPOFOL 10 MG/ML
INJECTION, EMULSION INTRAVENOUS AS NEEDED
Status: DISCONTINUED | OUTPATIENT
Start: 2024-05-02 | End: 2024-05-02

## 2024-05-02 RX ADMIN — PROPOFOL 150 MG: 10 INJECTION, EMULSION INTRAVENOUS at 12:14

## 2024-05-02 RX ADMIN — LIDOCAINE HYDROCHLORIDE 60 MG: 20 INJECTION, SOLUTION EPIDURAL; INFILTRATION; INTRACAUDAL; PERINEURAL at 12:13

## 2024-05-02 RX ADMIN — SODIUM CHLORIDE, SODIUM LACTATE, POTASSIUM CHLORIDE, AND CALCIUM CHLORIDE: .6; .31; .03; .02 INJECTION, SOLUTION INTRAVENOUS at 11:22

## 2024-05-02 NOTE — ANESTHESIA POSTPROCEDURE EVALUATION
Post-Op Assessment Note    CV Status:  Stable  Pain Score: 0    Pain management: adequate       Mental Status:  Alert and awake   Hydration Status:  Euvolemic   PONV Controlled:  Controlled   Airway Patency:  Patent     Post Op Vitals Reviewed: Yes    No anethesia notable event occurred.    Staff: Anesthesiologist               /92 (05/02/24 1225)    Temp 97.7 °F (36.5 °C) (05/02/24 1225)    Pulse 77 (05/02/24 1225)   Resp 14 (05/02/24 1225)    SpO2 98

## 2024-05-02 NOTE — ANESTHESIA PREPROCEDURE EVALUATION
Procedure:  COLONOSCOPY    Relevant Problems   No relevant active problems   Thyroid antibody positiveUmbilical hernia without obstruction and without gangrene     Physical Exam    Airway    Mallampati score: III  TM Distance: >3 FB  Neck ROM: full     Dental       Cardiovascular  Cardiovascular exam normal    Pulmonary  Pulmonary exam normal     Other Findings  post-pubertal.      Anesthesia Plan  ASA Score- 2     Anesthesia Type- IV sedation with anesthesia with ASA Monitors.         Additional Monitors:     Airway Plan:            Plan Factors-Exercise tolerance (METS): >4 METS.    Chart reviewed. EKG reviewed. Imaging results reviewed. Existing labs reviewed. Patient summary reviewed.                  Induction- intravenous.    Postoperative Plan-     Informed Consent- Anesthetic plan and risks discussed with patient.  I personally reviewed this patient with the CRNA. Discussed and agreed on the Anesthesia Plan with the CRNA..

## 2024-05-02 NOTE — INTERVAL H&P NOTE
H&P reviewed. After examining the patient I find no changes in the patients condition since the H&P had been written.    Vitals:    05/02/24 1027   BP: 130/83   Pulse: 78   Resp: 18   Temp: 97.7 °F (36.5 °C)   SpO2: 96%

## 2024-05-03 ENCOUNTER — TELEPHONE (OUTPATIENT)
Dept: GASTROENTEROLOGY | Facility: CLINIC | Age: 39
End: 2024-05-03

## 2024-05-03 LAB
G LAMBLIA AG STL QL IA: NEGATIVE
GAMMA INTERFERON BACKGROUND BLD IA-ACNC: 0.04 IU/ML
HBV CORE AB SER QL: NORMAL
HBV CORE IGM SER QL: NORMAL
HBV SURFACE AG SER QL: NORMAL
HCV AB SER QL: NORMAL
M TB IFN-G BLD-IMP: NEGATIVE
M TB IFN-G CD4+ BCKGRND COR BLD-ACNC: 0.01 IU/ML
M TB IFN-G CD4+ BCKGRND COR BLD-ACNC: 0.01 IU/ML
MITOGEN IGNF BCKGRD COR BLD-ACNC: 3.17 IU/ML
O+P STL CONC: NORMAL

## 2024-05-03 NOTE — TELEPHONE ENCOUNTER
Appt scheduled 06/20 with Jeni Dye..  Patient needs an order placed for a cat scan, per Dr. Nevarez  Please phone [phone] to advise.

## 2024-05-06 ENCOUNTER — TELEPHONE (OUTPATIENT)
Dept: GASTROENTEROLOGY | Facility: CLINIC | Age: 39
End: 2024-05-06

## 2024-05-06 NOTE — TELEPHONE ENCOUNTER
Called pt and advised.    Pt voiced understanding and wanted to cancel F/U with Alison, do the CT scan first, and then the F/U    Message routed to Clerical to reschedule.

## 2024-05-07 ENCOUNTER — TELEPHONE (OUTPATIENT)
Dept: GASTROENTEROLOGY | Facility: CLINIC | Age: 39
End: 2024-05-07

## 2024-05-07 LAB — CALPROTECTIN STL-MCNT: 514 UG/G (ref 0–120)

## 2024-05-07 NOTE — RESULT ENCOUNTER NOTE
I spoke with her.  She has new diagnosis of small bowel Crohn's.  I we reviewed her lab work and stool testing and biopsy results.  We went over the risks and benefits of current Biologics in 2024 and we agreed to start adalimumab.    Please get approval for adalimumab and please send this message to the IBD group for her to get approval and start this medication.  She has follow-up scheduled with Jeni Dye.

## 2024-05-07 NOTE — TELEPHONE ENCOUNTER
Please get approval for adalimumab and please send this message to the IBD group for her to get approval and start this medication.  She has follow-up scheduled with Jeni Dye.     Thank You!

## 2024-05-07 NOTE — TELEPHONE ENCOUNTER
----- Message from Ean Nevarez III, MD sent at 5/7/2024 11:50 AM EDT -----  I spoke with her.  She has new diagnosis of small bowel Crohn's.  I we reviewed her lab work and stool testing and biopsy results.  We went over the risks and benefits of current Biologics in 2024 and we agreed to start adalimumab.    Please get approval for adalimumab and please send this message to the IBD group for her to get approval and start this medication.  She has follow-up scheduled with Jeni Dye.

## 2024-05-13 DIAGNOSIS — K50.00 CROHN'S DISEASE OF SMALL INTESTINE WITHOUT COMPLICATION (HCC): Primary | ICD-10-CM

## 2024-05-13 RX ORDER — ADALIMUMAB 80MG/0.8ML
KIT SUBCUTANEOUS
Qty: 3 EACH | Refills: 0 | Status: SHIPPED | OUTPATIENT
Start: 2024-05-13

## 2024-05-16 ENCOUNTER — TELEPHONE (OUTPATIENT)
Age: 39
End: 2024-05-16

## 2024-05-16 NOTE — TELEPHONE ENCOUNTER
Pt called stating she is running late to her appt on 5/16/2024 at 2:20pm, I contacted the office to verify if the pt can still be seen. Per the provider the pt should be rescheduled, I informed the pt and she stated she just wants to let the provider know that she has been diagnosed with Crohns disease and she wants to know if she should remain on her thyroid medication. Please contact pt.

## 2024-05-17 NOTE — TELEPHONE ENCOUNTER
Called and left a voicemail to call us back. She needs to continue medication and make a new appointment

## 2024-05-23 ENCOUNTER — HOSPITAL ENCOUNTER (OUTPATIENT)
Dept: CT IMAGING | Facility: HOSPITAL | Age: 39
End: 2024-05-23
Attending: INTERNAL MEDICINE
Payer: COMMERCIAL

## 2024-05-23 DIAGNOSIS — K50.00 CROHN'S DISEASE OF SMALL INTESTINE WITHOUT COMPLICATION (HCC): ICD-10-CM

## 2024-05-23 PROCEDURE — 74177 CT ABD & PELVIS W/CONTRAST: CPT

## 2024-05-23 RX ADMIN — IOHEXOL 100 ML: 350 INJECTION, SOLUTION INTRAVENOUS at 15:51

## 2024-05-28 ENCOUNTER — NURSE TRIAGE (OUTPATIENT)
Dept: OTHER | Facility: OTHER | Age: 39
End: 2024-05-28

## 2024-05-28 NOTE — TELEPHONE ENCOUNTER
"Reason for Disposition  • SEVERE itching (i.e., interferes with sleep, normal activities or school)    Answer Assessment - Initial Assessment Questions  1. APPEARANCE of RASH: \"Describe the rash.\" (e.g., spots, blisters, raised areas, skin peeling, scaly)      Looks similar to poison ivy on elbow, spotty on thigh  2. SIZE: \"How big are the spots?\" (e.g., tip of pen, eraser, coin; inches, centimeters)      Pencil eraser sized spots  3. LOCATION: \"Where is the rash located?\"      Elbows, upper thighs, and under breast  4. COLOR: \"What color is the rash?\" (Note: It is difficult to assess rash color in people with darker-colored skin. When this situation occurs, simply ask the caller to describe what they see.)      reddened  5. ONSET: \"When did the rash begin?\"      Began a couple of days ago, finished first bottle of medication  6. FEVER: \"Do you have a fever?\" If Yes, ask: \"What is your temperature, how was it measured, and when did it start?\"      denies  7. ITCHING: \"Does the rash itch?\" If Yes, ask: \"How bad is the itch?\" (Scale 1-10; or mild, moderate, severe)      8/10 itchiness  8. CAUSE: \"What do you think is causing the rash?\"      Possible allergic reaction  9. MEDICATION FACTORS: \"Have you started any new medications within the last 2 weeks?\" (e.g., antibiotics)       budesonide  10. OTHER SYMPTOMS: \"Do you have any other symptoms?\" (e.g., dizziness, headache, sore throat, joint pain)        Leg cramping    Protocols used: Rash or Redness - Widespread-ADULT-AH    "

## 2024-05-29 DIAGNOSIS — K50.00 CROHN'S DISEASE OF SMALL INTESTINE WITHOUT COMPLICATION (HCC): Primary | ICD-10-CM

## 2024-05-29 RX ORDER — DIPHENHYDRAMINE HCL 50 MG
50 CAPSULE ORAL EVERY 6 HOURS PRN
Qty: 30 CAPSULE | Refills: 0 | Status: SHIPPED | OUTPATIENT
Start: 2024-05-29

## 2024-05-29 RX ORDER — PREDNISONE 10 MG/1
TABLET ORAL
Qty: 70 TABLET | Refills: 0 | Status: SHIPPED | OUTPATIENT
Start: 2024-05-29

## 2024-05-29 NOTE — TELEPHONE ENCOUNTER
Called & spoke to patient. Patient stated that she has a rash. Which is itchy , behind knees, on elbows, groin area.... she is asking if this can be a side effect of the budesonide. Please advise. Thank you !

## 2024-05-29 NOTE — TELEPHONE ENCOUNTER
Team, please let the patient know that she should stop budesonide and start prednisone instead.  I am not sure if it is related to the budesonide since is also a steroid.  I will also send Benadryl to the pharmacy.  If she has any wheezing or difficulty breathing, she must go to the emergency room.  Please make sure she has a follow-up with Jeni Dye.

## 2024-06-20 ENCOUNTER — OFFICE VISIT (OUTPATIENT)
Age: 39
End: 2024-06-20
Payer: COMMERCIAL

## 2024-06-20 ENCOUNTER — TELEPHONE (OUTPATIENT)
Age: 39
End: 2024-06-20

## 2024-06-20 VITALS
HEIGHT: 63 IN | RESPIRATION RATE: 18 BRPM | SYSTOLIC BLOOD PRESSURE: 114 MMHG | BODY MASS INDEX: 39.16 KG/M2 | DIASTOLIC BLOOD PRESSURE: 80 MMHG | OXYGEN SATURATION: 98 % | WEIGHT: 221 LBS | HEART RATE: 64 BPM

## 2024-06-20 DIAGNOSIS — K50.00 CROHN'S DISEASE OF SMALL INTESTINE WITHOUT COMPLICATION (HCC): Primary | ICD-10-CM

## 2024-06-20 DIAGNOSIS — K76.9 LIVER LESION: ICD-10-CM

## 2024-06-20 PROCEDURE — 99214 OFFICE O/P EST MOD 30 MIN: CPT | Performed by: PHYSICIAN ASSISTANT

## 2024-06-20 RX ORDER — ADALIMUMAB 40MG/0.4ML
KIT SUBCUTANEOUS
COMMUNITY
Start: 2024-06-19

## 2024-06-20 RX ORDER — ADALIMUMAB 80MG/0.8ML
KIT SUBCUTANEOUS
Qty: 1 EACH | Refills: 0 | Status: SHIPPED | OUTPATIENT
Start: 2024-06-20

## 2024-06-20 NOTE — TELEPHONE ENCOUNTER
I spoke with the patient. Provided [employer] phone number for patient to file a claim for Humira 80 mg pen misfire. Pt states did not receive any medication of Humira 80 mg pen prior to misfire. Pt aware prescription for replacement pen has been sent to [facility] pharmacy and for patient to call pharmacy after patient has filed a claim and administer once pen has been delivered and to start Humira 40mg every 14 day thereafter. Pt to call with any difficulty with replacement pen delivery.

## 2024-06-20 NOTE — TELEPHONE ENCOUNTER
----- Message from Jeni Dye PA-C sent at 6/20/2024 10:22 AM EDT -----  Can you please let patient know we also need to check a MRI to characterize the liver lesion seen on the CT enterography ( non urgent test) - these are generally just benign liver lesions. I placed the order - she would just need to call central scheduling to set it up.

## 2024-06-20 NOTE — PROGRESS NOTES
[facility] - Outpatient Follow-up Note  Ju Stacy 39 y.o. female MRN: [id number]  Encounter: [id number]          ASSESSMENT AND PLAN:      1. Crohn's disease of small intestine    Patient presents for follow up of her new diagnosis of crohn's disease.  She underwent an evaluation for rectal bleeding, diarrhea, and abdominal pain.  Colonoscopy 5/2 showed a terminal ileitis c/w crohn's disease.  CT enterography 5/23 showed a 14cm area of crohn's disease in the small bowel; no fistula, abscess, or stricture.  CRP 10.5. C diff negtive. Fecal calprotectin 514. Chronic hepatitis panel negative. TB quantiferon negative.  She was given a Budesonide course but then had a rash so she stopped it.  She was then given Prednisone but it caused nausea so she stopped it.  She was started on Humira and took the 160mg day one induction dose and was due for the 80mg dose but reports her pen misfired yesterday.  Her symptoms are improving: She reports one bowel movement a day now and no rectal bleeding; she still has abdominal bloating.    Patient will contact the [employer] [facility] for Humira and will also have our IBD team help her obtain a new Humira Pen so that she may inject for her Humira 80mg SQ dose ASAP and then continue with the maintenance dosing of 40mg sq every other week.   Annual eye exams, gyn exams, and dermatology/skin evaluations.  Vaccinations recommended per guidelines (Pneumococcal, Shingrix, and annual influenza). No live vaccines.  Will check CBC, CMP, CRP, and Adalimumab trough level prior to next visit.  Follow up in September.  Patient was instructed to contact us in the interim if any worsening symptoms.    2. Liver lesion    CT enterography also incidentally showed a hyperenhancing lesion in hepatic segment 6 measuring 3.9 x 2.8 cm is incompletely characterized. Considerations include a flash-filling hemangioma, focal nodular hyperplasia and adenoma. Recommend  contrast-enhanced MRI abdomen with Eovist.     MRI of the abdomen with Eovist contrast ordered for characterization of the liver lesion.  ______________________________________________________________________    SUBJECTIVE:  Patient is a pleasant 39 year old female who presents to the office for follow up of her new diagnosis of crohn's disease. She underwent an evaluation for rectal bleeding, diarrhea, and abdominal pain. Colonoscopy 5/2 showed a terminal ileitis c/w crohn's disease. CT enterography 5/23 showed a 14cm area of crohn's disease in the small bowel; no fistula, abscess, or stricture. CRP 10.5. C diff negtive. Fecal calprotectin 514. Chronic hepatitis panel negative. TB quantiferon negative. She was given a Budesonide course but then had a rash so she stopped it.  She was then given Prednisone but it caused nausea so she stopped it. She was started on Humira and took the 160mg day one induction dose and was due for the 80mg dose but reports her pen misfired yesterday.  She is quite upset about it at the office visit today.  She also reports she did have sweating and felt hot the day after her initial Humira injection. Her symptoms are improving: She reports one bowel movement a day now and no rectal bleeding; she still has abdominal bloating.      REVIEW OF SYSTEMS IS OTHERWISE NEGATIVE.      Historical Information   Past Medical History:   Diagnosis Date    Abdominal hernia     Disease of thyroid gland     Stress incontinence     Varicella     as child      Past Surgical History:   Procedure Laterality Date    COLPOSCOPY  2002    GYNECOLOGIC CRYOSURGERY  2002     Social History   Social History     Substance and Sexual Activity   Alcohol Use Yes    Alcohol/week: 1.0 standard drink of alcohol    Types: 1 Standard drinks or equivalent per week     Social History     Substance and Sexual Activity   Drug Use Never     Social History     Tobacco Use   Smoking Status Never   Smokeless Tobacco Never     Family  "History   Problem Relation Age of Onset    Asthma Mother     Hypertension Father     Hyperlipidemia Father     Colon cancer Maternal Uncle     No Known Problems Sister     No Known Problems Brother     No Known Problems Son     Heart disease Maternal Grandmother     Alzheimer's disease Maternal Grandfather     No Known Problems Sister        Meds/Allergies       Current Outpatient Medications:     levothyroxine 50 mcg tablet    Adalimumab (Humira-CD/UC/HS Starter) 80 MG/0.8ML PNKT    Humira, 2 Pen, 40 MG/0.4ML PNKT    Allergies   Allergen Reactions    Budesonide Rash           Objective     Blood pressure 114/80, pulse 64, resp. rate 18, height 5' 3\" (1.6 m), weight 100 kg (221 lb), SpO2 98%, not currently breastfeeding. Body mass index is 39.15 kg/m².      PHYSICAL EXAM:      General Appearance:   Alert, cooperative, no distress   HEENT:   Normocephalic, atraumatic, anicteric.     Neck:  Supple, symmetrical, trachea midline   Lungs:   Clear to auscultation bilaterally; no rales, rhonchi or wheezing; respirations unlabored    Heart::   Regular rate and rhythm; no murmur, rub, or gallop.   Abdomen:   Soft, non-tender, non-distended; normal bowel sounds; no masses, no organomegaly    Genitalia:   Deferred    Rectal:   Deferred    Extremities:  No cyanosis, clubbing or edema    Pulses:  2+ and symmetric    Skin:  No jaundice, rashes, or lesions    Lymph nodes:  No palpable cervical lymphadenopathy        Lab Results:   No visits with results within 1 Day(s) from this visit.   Latest known visit with results is:   Hospital Outpatient Visit on 05/02/2024   Component Date Value    EXT Preg Test, Ur 05/02/2024 Negative     Control 05/02/2024 Valid     Case Report 05/02/2024                      Value:Surgical Pathology Report                         Case: [id number]                                  Authorizing Provider:  Ean Nevarez III, MD   Collected:           05/02/2024 1220              Ordering Location:      " [facility]       Received:            05/02/2024 1347                                     [facility]                                                             Pathologist:           Jim Yancey MD                                                           Specimen:    Terminal Ileum                                                                             Final Diagnosis 05/02/2024                      Value:This result contains rich text formatting which cannot be displayed here.    Note 05/02/2024                      Value:This result contains rich text formatting which cannot be displayed here.    Additional Information 05/02/2024                      Value:This result contains rich text formatting which cannot be displayed here.    Gross Description 05/02/2024                      Value:This result contains rich text formatting which cannot be displayed here.    Clinical Information 05/02/2024                      Value:Cold bx r/o crohns    FINDINGS:  · Moderate erythematous, friable and ulcerated mucosa with erosion in the terminal ileum and ileocecal valve, consistent with Crohn's disease; performed cold forceps biopsy  · The ascending colon, hepatic flexure, transverse colon, splenic flexure, descending colon, sigmoid colon, rectosigmoid and rectum appeared normal.           Radiology Results:   CT small bowel enterography    Result Date: 6/3/2024  Narrative: CT ABDOMEN AND PELVIS WITH IV CONTRAST- ENTEROGRAPHY - WITH CONTRAST INDICATION: K50.00: Crohn's disease of small intestine without complications. COMPARISON: CT abdomen pelvis 2/3/2019 TECHNIQUE: Contrast-enhanced CT examination of the abdomen and pelvis was performed utilizing thin section technique and after the administration of low density enteric contrast according to protocol designed specifically to obtain sensitive evaluation of the small bowel. Multiplanar 2D reformatted images were created from the source data.  Radiation dose length product (DLP) for this visit: 1109 mGy-cm . This examination, like all CT scans performed in the [facility], was performed utilizing techniques to minimize radiation dose exposure, including the use of iterative reconstruction and automated exposure control. IV Contrast: 100 mL of iohexol (OMNIPAQUE) Enteric Contrast: 1350 cc of low density enteric contrast (Breeza) was administered. FINDINGS: ABDOMEN ENTEROGRAPHY: - Small bowel distension: Adequate. - Bowel: Diseased bowel segment(s) as follows: - Number of diseased segments: 1 - Segment: 1 *  Length: 14.6 cm, series 601 images 78 through 84 *  Location: Distal/terminal ileum *  Segmental mural hyperenhancement: Asymmetric. *  Wall thickening: Severe (greater or equal to 10 mm). *  Stricture: None. *  Ulcerations: Absent. *  Sacculations: Absent. *  Penetrating complication: None. - Mesenteric findings: Perienteric edema and/or inflammation. Reactive right lower quadrant mesenteric lymph nodes measuring less than 1 cm short axis. - Extraintestinal findings: None. REMAINDER OF THE ABDOMEN AND PELVIS: LOWER CHEST: No clinically significant abnormality in the visualized lower chest. LIVER/BILIARY TREE: Hyperenhancing lesion in hepatic segment 6 (series 2 image 120) measuring 3.9 x 2.8 cm. GALLBLADDER: No calcified gallstones. No pericholecystic inflammatory change. SPLEEN: Unremarkable. PANCREAS: Unremarkable. ADRENAL GLANDS: Unremarkable. KIDNEYS/URETERS: Left lower pole angiomyolipoma measuring 1.2 cm (series 2 image 144). No hydronephrosis. APPENDIX: Normal. ABDOMINOPELVIC CAVITY: No ascites. No pneumoperitoneum. No lymphadenopathy. VESSELS: Unremarkable for patient's age. PELVIS REPRODUCTIVE ORGANS: Left fundal exophytic fibroid again noted as seen on prior ultrasound and stable since CT 2/3/2019. URINARY BLADDER: Unremarkable. ABDOMINAL WALL/INGUINAL REGIONS: Unremarkable. BONES: No acute fracture or suspicious osseous  lesion. Spinal degenerative changes.     Impression: *  Inflammation: Active inflammatory small bowel Crohn's disease without luminal narrowing. Long segment of severe active inflammation in the distal/terminal ileum measuring 14.6 cm in length. *  Stricture: None. *  Penetrating complication: None. *  Perianal disease: None. *  Other complications: None. Hyperenhancing lesion in hepatic segment 6 measuring 3.9 x 2.8 cm is incompletely characterized. Considerations include a flash-filling hemangioma, focal nodular hyperplasia and adenoma. Recommend contrast-enhanced MRI abdomen with Eovist. The study was marked in EPIC for significant notification. Workstation performed: [id number]

## 2024-06-21 ENCOUNTER — OFFICE VISIT (OUTPATIENT)
Dept: ENDOCRINOLOGY | Facility: CLINIC | Age: 39
End: 2024-06-21
Payer: COMMERCIAL

## 2024-06-21 VITALS
OXYGEN SATURATION: 98 % | DIASTOLIC BLOOD PRESSURE: 72 MMHG | SYSTOLIC BLOOD PRESSURE: 128 MMHG | WEIGHT: 222.2 LBS | HEART RATE: 76 BPM | BODY MASS INDEX: 39.36 KG/M2 | TEMPERATURE: 97.2 F

## 2024-06-21 DIAGNOSIS — E66.01 CLASS 2 SEVERE OBESITY DUE TO EXCESS CALORIES WITH SERIOUS COMORBIDITY AND BODY MASS INDEX (BMI) OF 39.0 TO 39.9 IN ADULT (HCC): ICD-10-CM

## 2024-06-21 DIAGNOSIS — E03.8 SUBCLINICAL HYPOTHYROIDISM: Primary | ICD-10-CM

## 2024-06-21 DIAGNOSIS — R61 EXCESSIVE SWEATING: ICD-10-CM

## 2024-06-21 DIAGNOSIS — R68.89 HEAT INTOLERANCE: ICD-10-CM

## 2024-06-21 PROBLEM — E66.812 CLASS 2 SEVERE OBESITY DUE TO EXCESS CALORIES WITH SERIOUS COMORBIDITY AND BODY MASS INDEX (BMI) OF 39.0 TO 39.9 IN ADULT (HCC): Status: ACTIVE | Noted: 2024-06-21

## 2024-06-21 PROCEDURE — 99214 OFFICE O/P EST MOD 30 MIN: CPT | Performed by: STUDENT IN AN ORGANIZED HEALTH CARE EDUCATION/TRAINING PROGRAM

## 2024-06-21 NOTE — PROGRESS NOTES
Ju Stacy 39 y.o. female MRN: [id number]    Encounter: [id number]      Assessment & Plan     Problem List Items Addressed This Visit          Endocrine    Subclinical hypothyroidism - Primary     Due to Hashimoto's thyroiditis, in her last visit given positive anti-TPO, and convincing symptom for hypothyroidism, we decided to start LT4 replacement therapy, levothyroxine 50 mcg has been started and she has been taking it regularly and properly, she is concerned as she did not feel any different in her symptoms since initiating levothyroxine, and she has been experiencing with intolerance, excessive sweating and she is not able to lose.  I again reviewed pathophysiology of Hashimoto's thyroiditis, indication of LT4 treatment and subclinical hypothyroidism, I ordered TFT, and we will adjust levothyroxine if necessary.           Relevant Orders    Follicle stimulating hormone    Luteinizing hormone    TSH, 3rd generation       Other    Excessive sweating    Relevant Orders    Follicle stimulating hormone    Luteinizing hormone    TSH, 3rd generation    Heat intolerance     I checked TSH and free T4 to evaluate for over treatment of hypothyroidism.  Other differentials including pheochromocytoma, or Cushing's are less likely, she has no history of episodic headaches palpitations, tremors, and her adrenal gland was unremarkable in CAT scan.  She has no stigmata of Cushing's syndrome although I will do dexamethasone suppression test, however given recent high-dose steroid therapy, I will defer doing it for now.         Relevant Orders    Follicle stimulating hormone    Luteinizing hormone    TSH, 3rd generation    Class 2 severe obesity due to excess calories with serious comorbidity and body mass index (BMI) of 39.0 to 39.9 in adult (HCC)     Lifestyle modifications including regular daily exercise and balanced diet were encouraged, she is willing to try medical treatment, for weight loss, GLP-1 agonists are  an option however, given recent diagnosis of Crohn's, I would ask GI team thoughts on this.              CC:     Subclinical hypothyroidism     History of Present Illness      HPI:  Ju Stacy is a 39 year - old female who presents for follow up for subclinical hypothyroidism.  She is currently on levothyroxine 50 mcg once daily,  Ju was recently diagnosed with Crohn disease, she received high-dose steroid, which caused rash, currently on Humira.  She has been experiencing heat intolerance, excessive sweating and night sweats.  She denies tremor, palpitations, headaches, hypertension.  She denies hirsutism acne, violaceous stariae.  She reports having difficulty losing weight despite being physically active and watching her diet.  She reports heavy and crampy menstrual periods.    Review of Systems is otherwise negative    Historical Information   Past Medical History:   Diagnosis Date    Abdominal hernia     Disease of thyroid gland     Stress incontinence     Varicella     as child      Past Surgical History:   Procedure Laterality Date    COLPOSCOPY  2002    GYNECOLOGIC CRYOSURGERY  2002     Social History   Social History     Substance and Sexual Activity   Alcohol Use Yes    Alcohol/week: 1.0 standard drink of alcohol    Types: 1 Standard drinks or equivalent per week     Social History     Substance and Sexual Activity   Drug Use Never     Social History     Tobacco Use   Smoking Status Never   Smokeless Tobacco Never     Family History:   Family History   Problem Relation Age of Onset    Asthma Mother     Hypertension Father     Hyperlipidemia Father     Colon cancer Maternal Uncle     No Known Problems Sister     No Known Problems Brother     No Known Problems Son     Heart disease Maternal Grandmother     Alzheimer's disease Maternal Grandfather     No Known Problems Sister        Meds/Allergies   Current Outpatient Medications   Medication Sig Dispense Refill    Adalimumab (Humira-CD/UC/HS  Starter) 80 MG/0.8ML PNKT Inject under skin: 80mg on day 15 1 each 0    levothyroxine 50 mcg tablet take 1 tablet by mouth once daily 90 tablet 1    Adalimumab (Humira-CD/UC/HS Starter) 80 MG/0.8ML PNKT Inject under skin: 160mg on day 1 and 80mg on day 15 (Patient not taking: Reported on 6/20/2024) 3 each 0    Adalimumab 40 MG/0.4ML PNKT Inject 40 mg under the skin every 14 (fourteen) days (Patient not taking: Reported on 6/21/2024) 2 each 11    Humira, 2 Pen, 40 MG/0.4ML PNKT  (Patient not taking: Reported on 6/20/2024)       No current facility-administered medications for this visit.     Allergies   Allergen Reactions    Budesonide Rash       Objective   Vitals: Blood pressure 128/72, pulse 76, temperature (!) 97.2 °F (36.2 °C), weight 101 kg (222 lb 3.2 oz), SpO2 98%, not currently breastfeeding.    Physical Exam  Constitutional:       Appearance: She is well-developed.   HENT:      Head: Normocephalic and atraumatic.      Nose: Nose normal.   Eyes:      Extraocular Movements: EOM normal.      Pupils: Pupils are equal, round, and reactive to light.   Neck:      Thyroid: No thyromegaly.      Vascular: No JVD.   Cardiovascular:      Rate and Rhythm: Normal rate and regular rhythm.      Heart sounds: Normal heart sounds. No murmur heard.     No friction rub. No gallop.   Pulmonary:      Effort: Pulmonary effort is normal. No respiratory distress.      Breath sounds: Normal breath sounds. No stridor. No wheezing or rales.   Chest:      Chest wall: No tenderness.   Abdominal:      General: Bowel sounds are normal. There is no distension.      Palpations: Abdomen is soft. There is no mass.      Tenderness: There is no abdominal tenderness. There is no guarding.   Musculoskeletal:         General: No deformity or edema. Normal range of motion.      Cervical back: Normal range of motion.   Skin:     General: Skin is warm.   Neurological:      Mental Status: She is alert and oriented to person, place, and time.  "        The history was obtained from the review of the chart, patient.    Lab Results:   Lab Results   Component Value Date/Time    TSH 3RD GENERATON 2.943 03/22/2024 03:38 PM    TSH 3RD GENERATON 4.956 (H) 10/25/2023 06:36 AM    Free T4 0.81 03/22/2024 03:38 PM    Free T4 0.68 10/25/2023 06:36 AM     Imaging Studies:         I have personally reviewed pertinent reports.      Portions of the record may have been created with voice recognition software. Occasional wrong word or \"sound a like\" substitutions may have occurred due to the inherent limitations of voice recognition software. Read the chart carefully and recognize, using context, where substitutions have occurred.    "

## 2024-06-21 NOTE — ASSESSMENT & PLAN NOTE
Lifestyle modifications including regular daily exercise and balanced diet were encouraged, she is willing to try medical treatment, for weight loss, GLP-1 agonists are an option however, given recent diagnosis of Crohn's, I would ask GI team thoughts on this.

## 2024-06-21 NOTE — ASSESSMENT & PLAN NOTE
I checked TSH and free T4 to evaluate for over treatment of hypothyroidism.  Other differentials including pheochromocytoma, or Cushing's are less likely, she has no history of episodic headaches palpitations, tremors, and her adrenal gland was unremarkable in CAT scan.  She has no stigmata of Cushing's syndrome although I will do dexamethasone suppression test, however given recent high-dose steroid therapy, I will defer doing it for now.

## 2024-06-21 NOTE — ASSESSMENT & PLAN NOTE
Due to Hashimoto's thyroiditis, in her last visit given positive anti-TPO, and convincing symptom for hypothyroidism, we decided to start LT4 replacement therapy, levothyroxine 50 mcg has been started and she has been taking it regularly and properly, she is concerned as she did not feel any different in her symptoms since initiating levothyroxine, and she has been experiencing with intolerance, excessive sweating and she is not able to lose.  I again reviewed pathophysiology of Hashimoto's thyroiditis, indication of LT4 treatment and subclinical hypothyroidism, I ordered TFT, and we will adjust levothyroxine if necessary.

## 2024-06-26 ENCOUNTER — TELEPHONE (OUTPATIENT)
Dept: GASTROENTEROLOGY | Facility: CLINIC | Age: 39
End: 2024-06-26

## 2024-06-26 ENCOUNTER — APPOINTMENT (OUTPATIENT)
Dept: LAB | Facility: HOSPITAL | Age: 39
End: 2024-06-26
Payer: COMMERCIAL

## 2024-06-26 DIAGNOSIS — R68.89 HEAT INTOLERANCE: ICD-10-CM

## 2024-06-26 DIAGNOSIS — R61 EXCESSIVE SWEATING: ICD-10-CM

## 2024-06-26 DIAGNOSIS — E03.8 SUBCLINICAL HYPOTHYROIDISM: ICD-10-CM

## 2024-06-26 LAB
FSH SERPL-ACNC: 4.1 MIU/ML
LH SERPL-ACNC: 4.2 MIU/ML
TSH SERPL DL<=0.05 MIU/L-ACNC: 2.15 UIU/ML (ref 0.45–4.5)

## 2024-06-26 PROCEDURE — 83002 ASSAY OF GONADOTROPIN (LH): CPT

## 2024-06-26 PROCEDURE — 36415 COLL VENOUS BLD VENIPUNCTURE: CPT

## 2024-06-26 PROCEDURE — 83001 ASSAY OF GONADOTROPIN (FSH): CPT

## 2024-06-26 PROCEDURE — 84443 ASSAY THYROID STIM HORMONE: CPT

## 2024-06-26 NOTE — TELEPHONE ENCOUNTER
Pt called to find out which labs Jeni Dye ordered for Crohn's. While trying to get a nurse on the line the call was disconnected.

## 2024-07-29 DIAGNOSIS — E03.8 SUBCLINICAL HYPOTHYROIDISM: ICD-10-CM

## 2024-07-29 RX ORDER — LEVOTHYROXINE SODIUM 0.05 MG/1
50 TABLET ORAL DAILY
Qty: 30 TABLET | Refills: 5 | Status: SHIPPED | OUTPATIENT
Start: 2024-07-29

## 2024-08-06 ENCOUNTER — TELEPHONE (OUTPATIENT)
Age: 39
End: 2024-08-06

## 2024-08-06 ENCOUNTER — NURSE TRIAGE (OUTPATIENT)
Age: 39
End: 2024-08-06

## 2024-08-06 NOTE — TELEPHONE ENCOUNTER
"Last ov 6/20/24. Chron's.  Pt c/o since Tuesday has a day of vomiting alternating with day of diarrhea. Has tried toast, pretzel, broth, gatorade which she vomits. Is able to drink water, denies signs of dehydration. Today normal bm, vomiting x2,  is bloated and stomach is \"killing\" her. Took Humira injection on Saturday.       Reason for Disposition   MILD-MODERATE diarrhea (e.g., 1-6 times / day more than normal)    Answer Assessment - Initial Assessment Questions  1. DIARRHEA SEVERITY: \"How bad is the diarrhea?\" \"How many extra stools have you had in the past 24 hours than normal?\"     - NO DIARRHEA (SCALE 0)    - MILD (SCALE 1-3): Few loose or mushy BMs; increase of 1-3 stools over normal daily number of stools; mild increase in ostomy output.    -  MODERATE (SCALE 4-7): Increase of 4-6 stools daily over normal; moderate increase in ostomy output.  * SEVERE (SCALE 8-10; OR 'WORST POSSIBLE'): Increase of 7 or more stools daily over normal; moderate increase in ostomy output; incontinence.      4-5  2. ONSET: \"When did the diarrhea begin?\"       Last tuesday  3. BM CONSISTENCY: \"How loose or watery is the diarrhea?\"       diarrhea  4. VOMITING: \"Are you also vomiting?\" If Yes, ask: \"How many times in the past 24 hours?\"       2 times today  5. ABDOMINAL PAIN: \"Are you having any abdominal pain?\" If Yes, ask: \"What does it feel like?\" (e.g., crampy, dull, intermittent, constant)       Stomach is killing me  6. ABDOMINAL PAIN SEVERITY: If present, ask: \"How bad is the pain?\"  (e.g., Scale 1-10; mild, moderate, or severe)    - MILD (1-3): doesn't interfere with normal activities, abdomen soft and not tender to touch     - MODERATE (4-7): interferes with normal activities or awakens from sleep, tender to touch     - SEVERE (8-10): excruciating pain, doubled over, unable to do any normal activities        6/10  7. ORAL INTAKE: If vomiting, \"Have you been able to drink liquids?\" \"How much fluids have you had in the past " "24 hours?\"      Can keep water down  8. HYDRATION: \"Any signs of dehydration?\" (e.g., dry mouth [not just dry lips], too weak to stand, dizziness, new weight loss) \"When did you last urinate?\"      denies  9. EXPOSURE: \"Have you traveled to a foreign country recently?\" \"Have you been exposed to anyone with diarrhea?\" \"Could you have eaten any food that was spoiled?\"      denies  10. ANTIBIOTIC USE: \"Are you taking antibiotics now or have you taken antibiotics in the past 2 months?\"        denies  11. OTHER SYMPTOMS: \"Do you have any other symptoms?\" (e.g., fever, blood in stool)        denies  12. PREGNANCY: \"Is there any chance you are pregnant?\" \"When was your last menstrual period?\"        denies    Protocols used: Diarrhea-ADULT-[place]    "

## 2024-08-06 NOTE — TELEPHONE ENCOUNTER
Patients GI provider:  Dr. Nevarez    Number to return call: [phone]    Reason for call: Pt calling to advise she has been having diarrhea and vomiting for past week. Please call pt back.    Scheduled procedure/appointment date if applicable: Apt 09/18/2024

## 2024-08-07 ENCOUNTER — APPOINTMENT (EMERGENCY)
Dept: CT IMAGING | Facility: HOSPITAL | Age: 39
End: 2024-08-07
Payer: COMMERCIAL

## 2024-08-07 ENCOUNTER — HOSPITAL ENCOUNTER (EMERGENCY)
Facility: HOSPITAL | Age: 39
Discharge: HOME | End: 2024-08-07
Attending: EMERGENCY MEDICINE
Payer: COMMERCIAL

## 2024-08-07 VITALS
DIASTOLIC BLOOD PRESSURE: 83 MMHG | SYSTOLIC BLOOD PRESSURE: 149 MMHG | HEART RATE: 65 BPM | OXYGEN SATURATION: 97 % | RESPIRATION RATE: 16 BRPM | TEMPERATURE: 97.8 F

## 2024-08-07 DIAGNOSIS — K50.00 TERMINAL ILEITIS (HCC): Primary | ICD-10-CM

## 2024-08-07 DIAGNOSIS — K50.00 CROHN'S DISEASE OF ILEUM, WITHOUT COMPLICATIONS (HCC): ICD-10-CM

## 2024-08-07 DIAGNOSIS — K50.00 EXACERBATION OF CROHN'S DISEASE OF SMALL INTESTINE (HCC): ICD-10-CM

## 2024-08-07 LAB
ALBUMIN SERPL BCG-MCNC: 4.5 G/DL (ref 3.5–5)
ALP SERPL-CCNC: 49 U/L (ref 34–104)
ALT SERPL W P-5'-P-CCNC: 79 U/L (ref 7–52)
ANION GAP SERPL CALCULATED.3IONS-SCNC: 9 MMOL/L (ref 4–13)
AST SERPL W P-5'-P-CCNC: 44 U/L (ref 13–39)
BASOPHILS # BLD AUTO: 0.03 THOUSANDS/ÂΜL (ref 0–0.1)
BASOPHILS NFR BLD AUTO: 1 % (ref 0–1)
BILIRUB DIRECT SERPL-MCNC: 0.09 MG/DL (ref 0–0.2)
BILIRUB SERPL-MCNC: 0.45 MG/DL (ref 0.2–1)
BUN SERPL-MCNC: 11 MG/DL (ref 5–25)
CALCIUM SERPL-MCNC: 9.2 MG/DL (ref 8.4–10.2)
CHLORIDE SERPL-SCNC: 106 MMOL/L (ref 96–108)
CO2 SERPL-SCNC: 25 MMOL/L (ref 21–32)
CREAT SERPL-MCNC: 0.92 MG/DL (ref 0.6–1.3)
CRP SERPL QL: 3 MG/L
EOSINOPHIL # BLD AUTO: 0.14 THOUSAND/ÂΜL (ref 0–0.61)
EOSINOPHIL NFR BLD AUTO: 3 % (ref 0–6)
ERYTHROCYTE [DISTWIDTH] IN BLOOD BY AUTOMATED COUNT: 14.6 % (ref 11.6–15.1)
FLUAV RNA RESP QL NAA+PROBE: NEGATIVE
FLUBV RNA RESP QL NAA+PROBE: NEGATIVE
GFR SERPL CREATININE-BSD FRML MDRD: 78 ML/MIN/1.73SQ M
GLUCOSE SERPL-MCNC: 97 MG/DL (ref 65–140)
HCG SERPL QL: NEGATIVE
HCT VFR BLD AUTO: 41.3 % (ref 34.8–46.1)
HGB BLD-MCNC: 13.7 G/DL (ref 11.5–15.4)
IMM GRANULOCYTES # BLD AUTO: 0.02 THOUSAND/UL (ref 0–0.2)
IMM GRANULOCYTES NFR BLD AUTO: 0 % (ref 0–2)
LACTATE SERPL-SCNC: 0.6 MMOL/L (ref 0.5–2)
LIPASE SERPL-CCNC: 42 U/L (ref 11–82)
LYMPHOCYTES # BLD AUTO: 1.38 THOUSANDS/ÂΜL (ref 0.6–4.47)
LYMPHOCYTES NFR BLD AUTO: 25 % (ref 14–44)
MAGNESIUM SERPL-MCNC: 2 MG/DL (ref 1.9–2.7)
MCH RBC QN AUTO: 29.1 PG (ref 26.8–34.3)
MCHC RBC AUTO-ENTMCNC: 33.2 G/DL (ref 31.4–37.4)
MCV RBC AUTO: 88 FL (ref 82–98)
MONOCYTES # BLD AUTO: 0.51 THOUSAND/ÂΜL (ref 0.17–1.22)
MONOCYTES NFR BLD AUTO: 9 % (ref 4–12)
NEUTROPHILS # BLD AUTO: 3.37 THOUSANDS/ÂΜL (ref 1.85–7.62)
NEUTS SEG NFR BLD AUTO: 62 % (ref 43–75)
NRBC BLD AUTO-RTO: 0 /100 WBCS
PLATELET # BLD AUTO: 284 THOUSANDS/UL (ref 149–390)
PMV BLD AUTO: 9.8 FL (ref 8.9–12.7)
POTASSIUM SERPL-SCNC: 3.1 MMOL/L (ref 3.5–5.3)
PROT SERPL-MCNC: 7.7 G/DL (ref 6.4–8.4)
RBC # BLD AUTO: 4.7 MILLION/UL (ref 3.81–5.12)
RSV RNA RESP QL NAA+PROBE: NEGATIVE
SARS-COV-2 RNA RESP QL NAA+PROBE: NEGATIVE
SODIUM SERPL-SCNC: 140 MMOL/L (ref 135–147)
WBC # BLD AUTO: 5.45 THOUSAND/UL (ref 4.31–10.16)

## 2024-08-07 PROCEDURE — 0241U HB NFCT DS VIR RESP RNA 4 TRGT: CPT | Performed by: EMERGENCY MEDICINE

## 2024-08-07 PROCEDURE — 83735 ASSAY OF MAGNESIUM: CPT | Performed by: EMERGENCY MEDICINE

## 2024-08-07 PROCEDURE — 85025 COMPLETE CBC W/AUTO DIFF WBC: CPT | Performed by: EMERGENCY MEDICINE

## 2024-08-07 PROCEDURE — 83605 ASSAY OF LACTIC ACID: CPT | Performed by: EMERGENCY MEDICINE

## 2024-08-07 PROCEDURE — 80048 BASIC METABOLIC PNL TOTAL CA: CPT | Performed by: EMERGENCY MEDICINE

## 2024-08-07 PROCEDURE — 86140 C-REACTIVE PROTEIN: CPT | Performed by: EMERGENCY MEDICINE

## 2024-08-07 PROCEDURE — 84703 CHORIONIC GONADOTROPIN ASSAY: CPT | Performed by: EMERGENCY MEDICINE

## 2024-08-07 PROCEDURE — 74177 CT ABD & PELVIS W/CONTRAST: CPT

## 2024-08-07 PROCEDURE — 83690 ASSAY OF LIPASE: CPT | Performed by: EMERGENCY MEDICINE

## 2024-08-07 PROCEDURE — 80076 HEPATIC FUNCTION PANEL: CPT | Performed by: EMERGENCY MEDICINE

## 2024-08-07 PROCEDURE — 99285 EMERGENCY DEPT VISIT HI MDM: CPT

## 2024-08-07 PROCEDURE — 99285 EMERGENCY DEPT VISIT HI MDM: CPT | Performed by: EMERGENCY MEDICINE

## 2024-08-07 PROCEDURE — 96365 THER/PROPH/DIAG IV INF INIT: CPT

## 2024-08-07 PROCEDURE — 96375 TX/PRO/DX INJ NEW DRUG ADDON: CPT

## 2024-08-07 PROCEDURE — 96361 HYDRATE IV INFUSION ADD-ON: CPT

## 2024-08-07 PROCEDURE — 36415 COLL VENOUS BLD VENIPUNCTURE: CPT | Performed by: EMERGENCY MEDICINE

## 2024-08-07 RX ORDER — POTASSIUM CHLORIDE 20 MEQ/1
40 TABLET, EXTENDED RELEASE ORAL ONCE
Status: COMPLETED | OUTPATIENT
Start: 2024-08-07 | End: 2024-08-07

## 2024-08-07 RX ORDER — ONDANSETRON 4 MG/1
4 TABLET, ORALLY DISINTEGRATING ORAL EVERY 8 HOURS PRN
Qty: 30 TABLET | Refills: 0 | Status: SHIPPED | OUTPATIENT
Start: 2024-08-07

## 2024-08-07 RX ORDER — ACETAMINOPHEN 10 MG/ML
1000 INJECTION, SOLUTION INTRAVENOUS ONCE
Status: COMPLETED | OUTPATIENT
Start: 2024-08-07 | End: 2024-08-07

## 2024-08-07 RX ORDER — KETOROLAC TROMETHAMINE 30 MG/ML
15 INJECTION, SOLUTION INTRAMUSCULAR; INTRAVENOUS ONCE
Status: COMPLETED | OUTPATIENT
Start: 2024-08-07 | End: 2024-08-07

## 2024-08-07 RX ORDER — DICYCLOMINE HCL 20 MG
20 TABLET ORAL EVERY 8 HOURS PRN
Qty: 30 TABLET | Refills: 0 | Status: SHIPPED | OUTPATIENT
Start: 2024-08-07

## 2024-08-07 RX ORDER — DICYCLOMINE HCL 20 MG
20 TABLET ORAL ONCE
Status: COMPLETED | OUTPATIENT
Start: 2024-08-07 | End: 2024-08-07

## 2024-08-07 RX ORDER — PREDNISONE 10 MG/1
TABLET ORAL
Qty: 70 TABLET | Refills: 0 | Status: SHIPPED | OUTPATIENT
Start: 2024-08-07 | End: 2024-09-04

## 2024-08-07 RX ORDER — ONDANSETRON 2 MG/ML
4 INJECTION INTRAMUSCULAR; INTRAVENOUS ONCE
Status: COMPLETED | OUTPATIENT
Start: 2024-08-07 | End: 2024-08-07

## 2024-08-07 RX ADMIN — ACETAMINOPHEN 1000 MG: 10 INJECTION INTRAVENOUS at 10:20

## 2024-08-07 RX ADMIN — SODIUM CHLORIDE 1000 ML: 0.9 INJECTION, SOLUTION INTRAVENOUS at 10:20

## 2024-08-07 RX ADMIN — IOHEXOL 100 ML: 350 INJECTION, SOLUTION INTRAVENOUS at 11:13

## 2024-08-07 RX ADMIN — ONDANSETRON 4 MG: 2 INJECTION INTRAMUSCULAR; INTRAVENOUS at 10:21

## 2024-08-07 RX ADMIN — DICYCLOMINE HYDROCHLORIDE 20 MG: 20 TABLET ORAL at 10:31

## 2024-08-07 RX ADMIN — POTASSIUM CHLORIDE 40 MEQ: 1500 TABLET, EXTENDED RELEASE ORAL at 11:35

## 2024-08-07 RX ADMIN — KETOROLAC TROMETHAMINE 15 MG: 30 INJECTION, SOLUTION INTRAMUSCULAR; INTRAVENOUS at 11:35

## 2024-08-07 NOTE — ED PROVIDER NOTES
History  Chief Complaint   Patient presents with    Abdominal Pain     Mid abdominal pain, bloating, nausea, vomiting, and diarrhea after eating x1 week. Hx of Crohns.      Patient is a 39-year-old female with past medical history significant for hypothyroidism, recently diagnosed Crohn's disease in June 2024 currently on Humira injections every 2 weeks, last dose on 8/3, presents to the emergency department for abdominal pain and bloating, nausea, vomiting and diarrhea for over 1 week.  Patient states her symptom started last Monday, approximately 8-9 days ago.  She reports she feels very distended and bloated in the abdomen and has been having crampy mid to lower abdominal pain bilaterally.  She reports nausea, nonbilious nonbloody vomiting since her symptoms started.  She states over the weekend the vomiting was significant however it has calmed down slightly she is not having as frequent vomiting episodes.  She states yesterday she did try to eat a pretzel but then 2 hours later she threw it up.  She is also been having liquidy watery brown stool consistent with diarrhea, multiple episodes per day.  Symptoms are worse after eating.  She denies any associated fevers or shaking chills, headache, dizziness or near syncope, cough, URI symptoms, chest pain, shortness of breath, palpitations, bright red blood per rectum, melena, dysuria, change in frequency, hematuria, flank pain, skin rash or color change, extremity weakness or paresthesia or other focal neurologic deficits.  Denies any prior abdominal surgeries.  Patient follows with [facility] gastroenterology and they advised her to come to the ED for CT scan when she told them about her symptoms.  Patient denies any recent travel or unusual food intake.  Denies any sick contacts at home.      History provided by:  Patient   used: No    Abdominal Pain  Associated symptoms: diarrhea, nausea and vomiting    Associated symptoms: no chest pain, no  chills, no constipation, no cough, no dysuria, no fever, no hematuria, no shortness of breath and no sore throat        Prior to Admission Medications   Prescriptions Last Dose Informant Patient Reported? Taking?   Adalimumab (Humira-CD/UC/HS Starter) 80 MG/0.8ML PNKT  Self No No   Sig: Inject under skin: 160mg on day 1 and 80mg on day 15   Patient not taking: Reported on 6/20/2024   Adalimumab (Humira-CD/UC/HS Starter) 80 MG/0.8ML PNKT   No No   Sig: Inject under skin: 80mg on day 15   Adalimumab 40 MG/0.4ML PNKT   No No   Sig: Inject 40 mg under the skin every 14 (fourteen) days   Patient not taking: Reported on 6/21/2024   Humira, 2 Pen, 40 MG/0.4ML PNKT  Self Yes No   Patient not taking: Reported on 6/20/2024   levothyroxine 50 mcg tablet   No No   Sig: Take 1 tablet (50 mcg total) by mouth daily      Facility-Administered Medications: None       Past Medical History:   Diagnosis Date    Abdominal hernia     Crohn's colitis (HCC)     Disease of thyroid gland     Stress incontinence     Varicella     as child        Past Surgical History:   Procedure Laterality Date    COLPOSCOPY  2002    GYNECOLOGIC CRYOSURGERY  2002       Family History   Problem Relation Age of Onset    Asthma Mother     Hypertension Father     Hyperlipidemia Father     Colon cancer Maternal Uncle     No Known Problems Sister     No Known Problems Brother     No Known Problems Son     Heart disease Maternal Grandmother     Alzheimer's disease Maternal Grandfather     No Known Problems Sister      I have reviewed and agree with the history as documented.    E-Cigarette/Vaping    E-Cigarette Use Never User      E-Cigarette/Vaping Substances    Nicotine No     THC No     CBD No     Flavoring No     Other No     Unknown No      Social History     Tobacco Use    Smoking status: Never    Smokeless tobacco: Never   Vaping Use    Vaping status: Never Used   Substance Use Topics    Alcohol use: Not Currently     Alcohol/week: 1.0 standard drink of  alcohol     Types: 1 Standard drinks or equivalent per week    Drug use: Never       Review of Systems   Constitutional:  Negative for chills and fever.   HENT:  Negative for congestion, ear pain, rhinorrhea and sore throat.    Respiratory:  Negative for cough, chest tightness, shortness of breath and wheezing.    Cardiovascular:  Negative for chest pain and palpitations.   Gastrointestinal:  Positive for abdominal distention, abdominal pain, diarrhea, nausea and vomiting. Negative for blood in stool and constipation.   Genitourinary:  Negative for dysuria, flank pain, frequency and hematuria.   Musculoskeletal:  Negative for back pain and neck pain.   Skin:  Negative for color change, pallor, rash and wound.   Allergic/Immunologic: Negative for immunocompromised state.   Neurological:  Negative for dizziness, syncope, weakness, light-headedness, numbness and headaches.   Hematological:  Negative for adenopathy.   Psychiatric/Behavioral:  Negative for confusion and decreased concentration.    All other systems reviewed and are negative.      Physical Exam  Physical Exam  Vitals and nursing note reviewed.   Constitutional:       General: She is not in acute distress.     Appearance: Normal appearance. She is well-developed. She is obese. She is not ill-appearing, toxic-appearing or diaphoretic.   HENT:      Head: Normocephalic and atraumatic.      Right Ear: External ear normal.      Left Ear: External ear normal.      Mouth/Throat:      Mouth: Mucous membranes are moist.      Pharynx: Oropharynx is clear.   Eyes:      Extraocular Movements: Extraocular movements intact.      Conjunctiva/sclera: Conjunctivae normal.   Neck:      Vascular: No JVD.   Cardiovascular:      Rate and Rhythm: Normal rate and regular rhythm.      Pulses: Normal pulses.      Heart sounds: Normal heart sounds. No murmur heard.     No friction rub. No gallop.   Pulmonary:      Effort: Pulmonary effort is normal. No respiratory distress.       Breath sounds: Normal breath sounds. No wheezing, rhonchi or rales.   Abdominal:      General: There is distension.      Palpations: Abdomen is soft.      Tenderness: There is abdominal tenderness. There is no guarding or rebound.      Comments: Periumbilical and bilateral upper quadrant abdominal tenderness present.  Abdomen mildly distended.   Musculoskeletal:         General: No swelling or tenderness. Normal range of motion.      Cervical back: Normal range of motion and neck supple. No rigidity.   Skin:     General: Skin is warm and dry.      Coloration: Skin is not pale.      Findings: No erythema or rash.   Neurological:      General: No focal deficit present.      Mental Status: She is alert and oriented to person, place, and time.      Sensory: No sensory deficit.      Motor: No weakness.   Psychiatric:         Mood and Affect: Mood normal.         Behavior: Behavior normal.         Vital Signs  ED Triage Vitals   Temperature Pulse Respirations Blood Pressure SpO2   08/07/24 0936 08/07/24 0936 08/07/24 1030 08/07/24 0936 08/07/24 0936   97.8 °F (36.6 °C) 72 18 129/74 98 %      Temp Source Heart Rate Source Patient Position - Orthostatic VS BP Location FiO2 (%)   08/07/24 0936 08/07/24 1200 08/07/24 0936 08/07/24 0936 --   Skin Monitor Sitting Left arm       Pain Score       08/07/24 1135       Med Not Given for Pain - for MAR use only         Vitals:    08/07/24 0936 08/07/24 1030 08/07/24 1130 08/07/24 1200   BP: 129/74 162/79 152/84 149/83   BP Location: Left arm      Pulse: 72 65 70 65   Resp:  18  16   Temp: 97.8 °F (36.6 °C)      TempSrc: Skin      SpO2: 98% 97% 96% 97%          Visual Acuity      ED Medications  Medications   sodium chloride 0.9 % bolus 1,000 mL (0 mL Intravenous Stopped 8/7/24 1217)   ondansetron (ZOFRAN) injection 4 mg (4 mg Intravenous Given 8/7/24 1021)   dicyclomine (BENTYL) tablet 20 mg (20 mg Oral Given 8/7/24 1031)   acetaminophen (Ofirmev) injection 1,000 mg (0 mg Intravenous  Stopped 8/7/24 1135)   ketorolac (TORADOL) injection 15 mg (15 mg Intravenous Given 8/7/24 1135)   potassium chloride (Klor-Con M20) CR tablet 40 mEq (40 mEq Oral Given 8/7/24 1135)   iohexol (OMNIPAQUE) 350 MG/ML injection (MULTI-DOSE) 100 mL (100 mL Intravenous Given 8/7/24 1113)       Diagnostic Studies  Results Reviewed       Procedure Component Value Units Date/Time    FLU/RSV/COVID - if FLU/RSV clinically relevant [[id number]]  (Normal) Collected: 08/07/24 1019    Lab Status: Final result Specimen: Nares from Nose Updated: 08/07/24 1112     SARS-CoV-2 Negative     INFLUENZA A PCR Negative     INFLUENZA B PCR Negative     RSV PCR Negative    Narrative:      FOR PEDIATRIC PATIENTS - copy/paste COVID Guidelines URL to browser: [email or web address]    SARS-CoV-2 assay is a Nucleic Acid Amplification assay intended for the  qualitative detection of nucleic acid from SARS-CoV-2 in nasopharyngeal  swabs. Results are for the presumptive identification of SARS-CoV-2 RNA.    Positive results are indicative of infection with SARS-CoV-2, the virus  causing COVID-19, but do not rule out bacterial infection or co-infection  with other viruses. Laboratories within the [place] and its  territories are required to report all positive results to the appropriate  public health authorities. Negative results do not preclude SARS-CoV-2  infection and should not be used as the sole basis for treatment or other  patient management decisions. Negative results must be combined with  clinical observations, patient history, and epidemiological information.  This test has not been FDA cleared or approved.    This test has been authorized by FDA under an Emergency Use Authorization  (EUA). This test is only authorized for the duration of time the  declaration that circumstances exist justifying the authorization of the  emergency use of an in vitro diagnostic tests for detection of  SARS-CoV-2  virus and/or diagnosis of COVID-19 infection under section 564(b)(1) of  the Act, 21 U.S.C. 360bbb-3(b)(1), unless the authorization is terminated  or revoked sooner. The test has been validated but independent review by FDA  and CLIA is pending.    Test performed using [employer] GeneXpert: This RT-PCR assay targets N2,  a region unique to SARS-CoV-2. A conserved region in the E-gene was chosen  for pan-Sarbecovirus detection which includes SARS-CoV-2.    According to CMS-2020-01-R, this platform meets the definition of high-throughput technology.    hCG, qualitative pregnancy [[id number]]  (Normal) Collected: 08/07/24 1019    Lab Status: Final result Specimen: Blood from Arm, Left Updated: 08/07/24 1104     Preg, Serum Negative    Basic metabolic panel [[id number]]  (Abnormal) Collected: 08/07/24 1019    Lab Status: Final result Specimen: Blood from Arm, Left Updated: 08/07/24 1048     Sodium 140 mmol/L      Potassium 3.1 mmol/L      Chloride 106 mmol/L      CO2 25 mmol/L      ANION GAP 9 mmol/L      BUN 11 mg/dL      Creatinine 0.92 mg/dL      Glucose 97 mg/dL      Calcium 9.2 mg/dL      eGFR 78 ml/min/1.73sq m     Narrative:      National Kidney Disease Foundation guidelines for Chronic Kidney Disease (CKD):     Stage 1 with normal or high GFR (GFR > 90 mL/min/1.73 square meters)    Stage 2 Mild CKD (GFR = 60-89 mL/min/1.73 square meters)    Stage 3A Moderate CKD (GFR = 45-59 mL/min/1.73 square meters)    Stage 3B Moderate CKD (GFR = 30-44 mL/min/1.73 square meters)    Stage 4 Severe CKD (GFR = 15-29 mL/min/1.73 square meters)    Stage 5 End Stage CKD (GFR <15 mL/min/1.73 square meters)  Note: GFR calculation is accurate only with a steady state creatinine    Hepatic function panel [[id number]]  (Abnormal) Collected: 08/07/24 1019    Lab Status: Final result Specimen: Blood from Arm, Left Updated: 08/07/24 1048     Total Bilirubin 0.45 mg/dL      Bilirubin, Direct 0.09 mg/dL      Alkaline Phosphatase 49  U/L      AST 44 U/L      ALT 79 U/L      Total Protein 7.7 g/dL      Albumin 4.5 g/dL     Magnesium [[id number]]  (Normal) Collected: 08/07/24 1019    Lab Status: Final result Specimen: Blood from Arm, Left Updated: 08/07/24 1048     Magnesium 2.0 mg/dL     Lipase [[id number]]  (Normal) Collected: 08/07/24 1019    Lab Status: Final result Specimen: Blood from Arm, Left Updated: 08/07/24 1048     Lipase 42 u/L     C-reactive protein [[id number]]  (Abnormal) Collected: 08/07/24 1019    Lab Status: Final result Specimen: Blood from Arm, Left Updated: 08/07/24 1048     CRP 3.0 mg/L     Lactic acid, plasma (w/reflex if result > 2.0) [[id number]]  (Normal) Collected: 08/07/24 1019    Lab Status: Final result Specimen: Blood from Arm, Left Updated: 08/07/24 1046     LACTIC ACID 0.6 mmol/L     Narrative:      Result may be elevated if tourniquet was used during collection.    CBC and differential [[id number]] Collected: 08/07/24 1019    Lab Status: Final result Specimen: Blood from Arm, Left Updated: 08/07/24 1033     WBC 5.45 Thousand/uL      RBC 4.70 Million/uL      Hemoglobin 13.7 g/dL      Hematocrit 41.3 %      MCV 88 fL      MCH 29.1 pg      MCHC 33.2 g/dL      RDW 14.6 %      MPV 9.8 fL      Platelets 284 Thousands/uL      nRBC 0 /100 WBCs      Segmented % 62 %      Immature Grans % 0 %      Lymphocytes % 25 %      Monocytes % 9 %      Eosinophils Relative 3 %      Basophils Relative 1 %      Absolute Neutrophils 3.37 Thousands/µL      Absolute Immature Grans 0.02 Thousand/uL      Absolute Lymphocytes 1.38 Thousands/µL      Absolute Monocytes 0.51 Thousand/µL      Eosinophils Absolute 0.14 Thousand/µL      Basophils Absolute 0.03 Thousands/µL                    CT abdomen pelvis with contrast   Final Result by Akbar Crews MD (08/07 1144)      Findings consistent with active terminal ileitis as above secondary to known Crohn's disease.      Enhancing lesion within the right hepatic lobe redemonstrated. A dedicated liver  protocol MRI study without and with intravenous gadolinium may be helpful for further characterization.               Workstation performed: [id number]                    Procedures  Procedures         ED Course  ED Course as of 08/07/24 1554   Wed Aug 07, 2024   1148 Reviewed CT findings of active terminal ileitis consistent with Crohn's flare.  She also has enhanced liver lesion which was seen on prior imaging and patient and GI are already aware of this and have ordered outpatient MRI which is still pending.  Will discuss with GI for medication recommendations in regards to her symptoms.   1205 Spoke with gastroenterology AP, Alison Terry, who recommended giving prednisone taper starting at 40 mg and decreasing by 10 mg every week.  In the past, prednisone has caused nausea so she recommended giving prescription for Zofran and for patient to take Zofran 10 minutes prior to taking the prednisone and to take the prednisone with food in the morning.  She also recommended patient have a low roughage diet.  She will have GI AP, Jeni Dye follow up with patient.   1207 Will cancel stool studies as patient had negative stool studies back in April.  Her symptoms are most consistent with a Crohn's flareup.   1216 Updated patient about plan to start prednisone.  Patient is hesitant about the prednisone as she states it makes her gain weight but I advised given her allergy to budesonide, there are limited options at this point and recommended she try the prednisone until she can follow-up with her GI provider.  Advised her to take it with food and to take Zofran 10 minutes prior to the prednisone to avoid nausea.  Patient's symptoms overall improved.  Discussed ED return parameters.                                               Medical Decision Making  39-year-old female presents to the ED for 9 days of abdominal pain, nausea, vomiting, diarrhea.  Patient recently diagnosed with Crohn's disease 2 months ago and is  on Humira.  Suspect flareup of Crohn's disease however other considerations include infectious gastroenteritis, biliary disease or cholecystitis, pancreatitis.  Will check abdominal labs including lactic acid and CRP and will obtain CT scan of the abdomen and pelvis with IV contrast.  Will provide IV fluids, Toradol, Tylenol, Bentyl, Zofran for symptom relief.  Will ultimately consult with gastroenterology pending workup.    Amount and/or Complexity of Data Reviewed  Labs: ordered. Decision-making details documented in ED Course.  Radiology: ordered. Decision-making details documented in ED Course.    Risk  Prescription drug management.                 Disposition  Final diagnoses:   Terminal ileitis (HCC)   Crohn's disease of ileum, without complications (HCC)   Exacerbation of Crohn's disease of small intestine (HCC)     Time reflects when diagnosis was documented in both MDM as applicable and the Disposition within this note       Time User Action Codes Description Comment    8/7/2024 12:11 PM Claudine Bolden Add [K50.00] Terminal ileitis (HCC)     8/7/2024 12:12 PM Claudine Bolden Add [K50.00] Crohn's disease of ileum, without complications (HCC)     8/7/2024 12:12 PM Claudine Bolden E Add [K50.00] Exacerbation of Crohn's disease of small intestine (HCC)           ED Disposition       ED Disposition   Discharge    Condition   Stable    Date/Time   Wed Aug 7, 2024 12:11 PM    Comment   Ju Stacy discharge to home/self care.                   Follow-up Information       Follow up With Specialties Details Why Contact Info Additional Information    [facility] [place] [facility] Schedule an appointment as soon as possible for a visit   [address]  [address]  [facility] [address] [phone] [facility] [place], [address], [place], [place], [address] [phone]     Jeni Dye PA-C  Gastroenterology, Physician Assistant Schedule an appointment as soon as possible for a visit   [address]  [address]  [facility] [address]  [phone]       [facility] Emergency Department Emergency Medicine Go to  If symptoms worsen [address] [facility] [facility] [id number] [phone] [facility] Emergency Department, [address] [place], [place], [address]            Discharge Medication List as of 8/7/2024 12:16 PM        START taking these medications    Details   dicyclomine (BENTYL) 20 mg tablet Take 1 tablet (20 mg total) by mouth every 8 (eight) hours as needed (abdominal cramping or diarrhea), Starting Wed 8/7/2024, Normal      ondansetron (ZOFRAN-ODT) 4 mg disintegrating tablet Take 1 tablet (4 mg total) by mouth every 8 (eight) hours as needed for nausea or vomiting, Starting Wed 8/7/2024, Normal      predniSONE 10 mg tablet Multiple Dosages:Starting Wed 8/7/2024, Until Tue 8/13/2024 at 2359, THEN Starting Wed 8/14/2024, Until Tue 8/20/2024 at 2359, THEN Starting Wed 8/21/2024, Until Tue 8/27/2024 at 2359, THEN Starting Wed 8/28/2024, Until Tue 9/3/2024 at 2359Take 4 ta blets (40 mg total) by mouth daily with breakfast for 7 days, THEN 3 tablets (30 mg total) daily with breakfast for 7 days, THEN 2 tablets (20 mg total) daily with breakfast for 7 days, THEN 1 tablet (10 mg total) daily with breakfast for 7 days., Normal           CONTINUE these medications which have NOT CHANGED    Details   !! Adalimumab (Humira-CD/UC/HS Starter) 80 MG/0.8ML PNKT Inject under skin: 160mg on day 1 and 80mg on day 15, Normal      !! Adalimumab (Humira-CD/UC/HS Starter) 80 MG/0.8ML PNKT Inject under skin: 80mg on day 15, Normal      !! Adalimumab 40 MG/0.4ML PNKT Inject 40 mg under the skin every 14 (fourteen) days, Starting Fri 6/21/2024, Normal      !! Humira, 2 Pen, 40 MG/0.4ML PNKT Historical Med      levothyroxine 50 mcg tablet Take 1  tablet (50 mcg total) by mouth daily, Starting Mon 7/29/2024, Normal       !! - Potential duplicate medications found. Please discuss with provider.          No discharge procedures on file.    PDMP Review         Value Time User    PDMP Reviewed  Yes 4/22/2020  8:58 AM Shivani Jeffery MD            ED Provider  Electronically Signed by             Claudine Bolden DO  08/07/24 1559

## 2024-08-07 NOTE — TELEPHONE ENCOUNTER
Patient went to ER was advised to fup with GI provider she was Rx predniSONE 10 mg tablet  which she does not want to take she would like a call back from PROVIDER to discuss

## 2024-08-08 ENCOUNTER — VBI (OUTPATIENT)
Dept: ADMINISTRATIVE | Facility: OTHER | Age: 39
End: 2024-08-08

## 2024-08-08 NOTE — TELEPHONE ENCOUNTER
08/08/24 10:01 AM    Patient contacted post ED visit, [facility] department spoke with patient/caregiver and outreach was successful.    Thank you.  Reina Feng MA  [facility]

## 2024-08-21 ENCOUNTER — TELEPHONE (OUTPATIENT)
Age: 39
End: 2024-08-21

## 2024-08-21 ENCOUNTER — OFFICE VISIT (OUTPATIENT)
Age: 39
End: 2024-08-21
Payer: COMMERCIAL

## 2024-08-21 VITALS
DIASTOLIC BLOOD PRESSURE: 84 MMHG | HEART RATE: 80 BPM | SYSTOLIC BLOOD PRESSURE: 122 MMHG | HEIGHT: 63 IN | WEIGHT: 222.6 LBS | BODY MASS INDEX: 39.44 KG/M2 | OXYGEN SATURATION: 98 %

## 2024-08-21 DIAGNOSIS — K50.019 CROHN'S DISEASE OF SMALL INTESTINE WITH COMPLICATION (HCC): Primary | ICD-10-CM

## 2024-08-21 PROCEDURE — 99214 OFFICE O/P EST MOD 30 MIN: CPT | Performed by: PHYSICIAN ASSISTANT

## 2024-08-21 RX ORDER — PREDNISONE 10 MG/1
TABLET ORAL
Qty: 21 TABLET | Refills: 0 | Status: SHIPPED | OUTPATIENT
Start: 2024-08-21

## 2024-08-21 NOTE — PROGRESS NOTES
[facility] - Outpatient Follow-up Note  Ju Stacy 39 y.o. female MRN: [id number]  Encounter: [id number]          ASSESSMENT AND PLAN:      1. Crohn's disease of small intestine    Patient presents for follow up of her recent diagnosis of crohn's disease. She underwent an evaluation for rectal bleeding, diarrhea, and abdominal pain.  Colonoscopy 5/2 showed a terminal ileitis c/w crohn's disease.  CT enterography 5/23 showed a 14cm area of crohn's disease in the small bowel; no fistula, abscess, or stricture.  CRP 10.5. C diff negtive. Fecal calprotectin 514. Chronic hepatitis panel negative. TB quantiferon negative.  She was given a Budesonide course but then had a rash so she stopped it.  She was started on Humira in June but did initially have an issue with a pen misfiring which delayed treatment.  She was in the ER 8/7 due to symptoms and CT Scan A/P showed active terminal ileitis.  She was started on a Prednisone taper at 40mg po daily with decrease of 10 mg per week.  She reports 3 loose bowel movements a day at present; no bleeding; abdominal discomfort has lessened.    Continue Humira 40mg SQ every other week.  Continue the Prednisone taper.  She will do labs: CBC, CMP, CRP and adalimumab trough level and for adalimumab abs on 8/30 (the day prior to her next injection).  Annual eye exams, gyn exams, and dermatology/skin evaluations.  Vaccinations recommended per guidelines (Pneumococcal, Shingrix, and annual influenza). No live vaccines.  Follow up in 2 months.    ______________________________________________________________________    SUBJECTIVE:  Patient is a pleasant 39 year old female who presents to the office for follow up of her crohn's disease.  She recently started Humira and is currently on a Prednisone taper.  She reports 3 loose stools a day. No rectal bleeding.  Abdominal pain has lessened - she reports a soreness.  She does report weight gain and  "diaphoresis with the Prednisone.      REVIEW OF SYSTEMS IS OTHERWISE NEGATIVE.      Historical Information   Past Medical History:   Diagnosis Date    Abdominal hernia     Crohn's colitis (HCC)     Disease of thyroid gland     Stress incontinence     Varicella     as child      Past Surgical History:   Procedure Laterality Date    COLPOSCOPY  2002    GYNECOLOGIC CRYOSURGERY  2002     Social History   Social History     Substance and Sexual Activity   Alcohol Use Not Currently    Alcohol/week: 1.0 standard drink of alcohol    Types: 1 Standard drinks or equivalent per week     Social History     Substance and Sexual Activity   Drug Use Never     Social History     Tobacco Use   Smoking Status Never   Smokeless Tobacco Never     Family History   Problem Relation Age of Onset    Asthma Mother     Hypertension Father     Hyperlipidemia Father     Colon cancer Maternal Uncle     No Known Problems Sister     No Known Problems Brother     No Known Problems Son     Heart disease Maternal Grandmother     Alzheimer's disease Maternal Grandfather     No Known Problems Sister        Meds/Allergies       Current Outpatient Medications:     predniSONE 10 mg tablet    Adalimumab (Humira-CD/UC/HS Starter) 80 MG/0.8ML PNKT    Adalimumab (Humira-CD/UC/HS Starter) 80 MG/0.8ML PNKT    Adalimumab 40 MG/0.4ML PNKT    dicyclomine (BENTYL) 20 mg tablet    Humira, 2 Pen, 40 MG/0.4ML PNKT    levothyroxine 50 mcg tablet    ondansetron (ZOFRAN-ODT) 4 mg disintegrating tablet    predniSONE 10 mg tablet    Allergies   Allergen Reactions    Budesonide Rash           Objective     Blood pressure 122/84, pulse 80, height 5' 3\" (1.6 m), weight 101 kg (222 lb 9.6 oz), last menstrual period 07/16/2024, SpO2 98%, not currently breastfeeding. Body mass index is 39.43 kg/m².      PHYSICAL EXAM:      General Appearance:   Alert, cooperative, no distress   HEENT:   Normocephalic, atraumatic, anicteric.     Neck:  Supple, symmetrical, trachea midline "   Lungs:   Clear to auscultation bilaterally; no rales, rhonchi or wheezing; respirations unlabored    Heart::   Regular rate and rhythm; no murmur, rub, or gallop.   Abdomen:   Soft, non-tender, non-distended; normal bowel sounds; no masses, no organomegaly    Genitalia:   Deferred    Rectal:   Deferred    Extremities:  No cyanosis, clubbing or edema    Pulses:  2+ and symmetric    Skin:  No jaundice, rashes, or lesions    Lymph nodes:  No palpable cervical lymphadenopathy        Lab Results:   No visits with results within 1 Day(s) from this visit.   Latest known visit with results is:   Admission on 08/07/2024, Discharged on 08/07/2024   Component Date Value    WBC 08/07/2024 5.45     RBC 08/07/2024 4.70     Hemoglobin 08/07/2024 13.7     Hematocrit 08/07/2024 41.3     MCV 08/07/2024 88     MCH 08/07/2024 29.1     MCHC 08/07/2024 33.2     RDW 08/07/2024 14.6     MPV 08/07/2024 9.8     Platelets 08/07/2024 284     nRBC 08/07/2024 0     Segmented % 08/07/2024 62     Immature Grans % 08/07/2024 0     Lymphocytes % 08/07/2024 25     Monocytes % 08/07/2024 9     Eosinophils Relative 08/07/2024 3     Basophils Relative 08/07/2024 1     Absolute Neutrophils 08/07/2024 3.37     Absolute Immature Grans 08/07/2024 0.02     Absolute Lymphocytes 08/07/2024 1.38     Absolute Monocytes 08/07/2024 0.51     Eosinophils Absolute 08/07/2024 0.14     Basophils Absolute 08/07/2024 0.03     Sodium 08/07/2024 140     Potassium 08/07/2024 3.1 (L)     Chloride 08/07/2024 106     CO2 08/07/2024 25     ANION GAP 08/07/2024 9     BUN 08/07/2024 11     Creatinine 08/07/2024 0.92     Glucose 08/07/2024 97     Calcium 08/07/2024 9.2     eGFR 08/07/2024 78     Total Bilirubin 08/07/2024 0.45     Bilirubin, Direct 08/07/2024 0.09     Alkaline Phosphatase 08/07/2024 49     AST 08/07/2024 44 (H)     ALT 08/07/2024 79 (H)     Total Protein 08/07/2024 7.7     Albumin 08/07/2024 4.5     Magnesium 08/07/2024 2.0     Lipase 08/07/2024 42     LACTIC  ACID 08/07/2024 0.6     Preg, Serum 08/07/2024 Negative     CRP 08/07/2024 3.0 (H)     SARS-CoV-2 08/07/2024 Negative     INFLUENZA A PCR 08/07/2024 Negative     INFLUENZA B PCR 08/07/2024 Negative     RSV PCR 08/07/2024 Negative          Radiology Results:   CT abdomen pelvis with contrast    Result Date: 8/7/2024  Narrative: CT ABDOMEN AND PELVIS WITH IV CONTRAST INDICATION: abdominal pain (mid/lower) and bloating, n/v/d x 1.5 wks, h/o crohn's. GI sent in for CT. COMPARISON: 5/23/2024. TECHNIQUE: CT examination of the abdomen and pelvis was performed. Multiplanar 2D reformatted images were created from the source data. This examination, like all CT scans performed in the [facility], was performed utilizing techniques to minimize radiation dose exposure, including the use of iterative reconstruction and automated exposure control. Radiation dose length product (DLP) for this visit: 853 mGy-cm IV Contrast: 100 mL of iohexol (OMNIPAQUE) Enteric Contrast: Not administered. FINDINGS: ABDOMEN LOWER CHEST: Minimal dependent atelectasis in the lower lobes. LIVER/BILIARY TREE: The enhancing lesion within the right hepatic lobe measuring at least 3.9 cm again seen. No biliary ductal dilatation. GALLBLADDER: No calcified gallstones. No pericholecystic inflammatory change. SPLEEN: Unremarkable. PANCREAS: Unremarkable. ADRENAL GLANDS: Unremarkable. KIDNEYS/URETERS: There is a small fat-containing left renal lesion consistent with an angiomyolipoma. STOMACH AND BOWEL: No bowel obstruction. There is wall thickening of the terminal ileum with mild surrounding inflammation again seen suggestive of active terminal ileitis secondary to known Crohn's disease. APPENDIX: No findings to suggest appendicitis. ABDOMINOPELVIC CAVITY: No ascites. No pneumoperitoneum. No lymphadenopathy. VESSELS: Unremarkable for patient's age. PELVIS REPRODUCTIVE ORGANS: Unremarkable for patient's age. URINARY BLADDER: Not well distended  limiting evaluation. ABDOMINAL WALL/INGUINAL REGIONS: Small fat-containing periumbilical hernia. BONES: No acute fracture or suspicious osseous lesion.     Impression: Findings consistent with active terminal ileitis as above secondary to known Crohn's disease. Enhancing lesion within the right hepatic lobe redemonstrated. A dedicated liver protocol MRI study without and with intravenous gadolinium may be helpful for further characterization. Workstation performed: [id number]

## 2024-08-24 ENCOUNTER — HOSPITAL ENCOUNTER (OUTPATIENT)
Dept: MRI IMAGING | Facility: HOSPITAL | Age: 39
Discharge: HOME | End: 2024-08-24
Payer: COMMERCIAL

## 2024-08-24 DIAGNOSIS — K76.9 LIVER LESION: ICD-10-CM

## 2024-08-24 PROCEDURE — 74183 MRI ABD W/O CNTR FLWD CNTR: CPT

## 2024-08-24 PROCEDURE — A9581 GADOXETATE DISODIUM INJ: HCPCS | Performed by: PHYSICIAN ASSISTANT

## 2024-08-24 RX ADMIN — GADOXETATE DISODIUM 10 ML: 181.43 INJECTION, SOLUTION INTRAVENOUS at 12:29

## 2024-08-30 ENCOUNTER — APPOINTMENT (OUTPATIENT)
Dept: LAB | Facility: CLINIC | Age: 39
End: 2024-08-30
Payer: COMMERCIAL

## 2024-08-30 DIAGNOSIS — K50.00 CROHN'S DISEASE OF SMALL INTESTINE WITHOUT COMPLICATION (HCC): ICD-10-CM

## 2024-08-30 LAB
ALBUMIN SERPL BCG-MCNC: 4.2 G/DL (ref 3.5–5)
ALP SERPL-CCNC: 61 U/L (ref 34–104)
ALT SERPL W P-5'-P-CCNC: 71 U/L (ref 7–52)
ANION GAP SERPL CALCULATED.3IONS-SCNC: 10 MMOL/L (ref 4–13)
AST SERPL W P-5'-P-CCNC: 36 U/L (ref 13–39)
BILIRUB SERPL-MCNC: 0.39 MG/DL (ref 0.2–1)
BUN SERPL-MCNC: 12 MG/DL (ref 5–25)
CALCIUM SERPL-MCNC: 9.1 MG/DL (ref 8.4–10.2)
CHLORIDE SERPL-SCNC: 101 MMOL/L (ref 96–108)
CO2 SERPL-SCNC: 24 MMOL/L (ref 21–32)
CREAT SERPL-MCNC: 1.11 MG/DL (ref 0.6–1.3)
CRP SERPL QL: 3 MG/L
ERYTHROCYTE [DISTWIDTH] IN BLOOD BY AUTOMATED COUNT: 14.4 % (ref 11.6–15.1)
GFR SERPL CREATININE-BSD FRML MDRD: 62 ML/MIN/1.73SQ M
GLUCOSE SERPL-MCNC: 84 MG/DL (ref 65–140)
HCT VFR BLD AUTO: 42.3 % (ref 34.8–46.1)
HGB BLD-MCNC: 13.4 G/DL (ref 11.5–15.4)
MCH RBC QN AUTO: 28.7 PG (ref 26.8–34.3)
MCHC RBC AUTO-ENTMCNC: 31.7 G/DL (ref 31.4–37.4)
MCV RBC AUTO: 91 FL (ref 82–98)
PLATELET # BLD AUTO: 333 THOUSANDS/UL (ref 149–390)
PMV BLD AUTO: 10.6 FL (ref 8.9–12.7)
POTASSIUM SERPL-SCNC: 3.8 MMOL/L (ref 3.5–5.3)
PROT SERPL-MCNC: 7.1 G/DL (ref 6.4–8.4)
RBC # BLD AUTO: 4.67 MILLION/UL (ref 3.81–5.12)
SODIUM SERPL-SCNC: 135 MMOL/L (ref 135–147)
WBC # BLD AUTO: 8.73 THOUSAND/UL (ref 4.31–10.16)

## 2024-08-30 PROCEDURE — 82397 CHEMILUMINESCENT ASSAY: CPT

## 2024-08-30 PROCEDURE — 80053 COMPREHEN METABOLIC PANEL: CPT

## 2024-08-30 PROCEDURE — 85027 COMPLETE CBC AUTOMATED: CPT

## 2024-08-30 PROCEDURE — 36415 COLL VENOUS BLD VENIPUNCTURE: CPT

## 2024-08-30 PROCEDURE — 86140 C-REACTIVE PROTEIN: CPT

## 2024-08-30 PROCEDURE — 80145 DRUG ASSAY ADALIMUMAB: CPT

## 2024-09-12 ENCOUNTER — TELEPHONE (OUTPATIENT)
Age: 39
End: 2024-09-12

## 2024-09-12 DIAGNOSIS — R74.01 TRANSAMINITIS: Primary | ICD-10-CM

## 2024-09-12 LAB
ADALIMUMAB AB SERPL-MCNC: <25 NG/ML
ADALIMUMAB SERPL-MCNC: 4.8 UG/ML

## 2024-09-13 ENCOUNTER — TELEPHONE (OUTPATIENT)
Age: 39
End: 2024-09-13

## 2024-09-13 NOTE — TELEPHONE ENCOUNTER
Jeni Dye PA-C  [facility] hours ago (3:38 PM)       Hi, can we please have patient's Humira authorized for 40mg SQ weekly.  Her drug level came back low at 4.8 on standard dosing. Ty!

## 2024-09-17 DIAGNOSIS — K50.00 CROHN'S DISEASE OF SMALL INTESTINE WITHOUT COMPLICATION (HCC): Primary | ICD-10-CM

## 2024-09-17 RX ORDER — ADALIMUMAB 40MG/0.4ML
40 KIT SUBCUTANEOUS
Qty: 4 EACH | Refills: 5 | Status: SHIPPED | OUTPATIENT
Start: 2024-09-17

## 2024-09-17 NOTE — TELEPHONE ENCOUNTER
Medication: Humira 40mg CF pens  Directions: inject 40mg weekly  Quantity: 4 pens  Day Supply: 28  Insurance: [employer]  How Prior Auth was submitted: Sin  Authorization Date range: 9/11/2024 - 9/11/2025  Authorization Number: #[id number]  Pharmacy that fills med: [facility]

## 2024-10-12 ENCOUNTER — TELEPHONE (OUTPATIENT)
Dept: OTHER | Facility: OTHER | Age: 39
End: 2024-10-12

## 2024-10-12 NOTE — TELEPHONE ENCOUNTER
"Pt stated, \"The pharmacy needs a copy of the insurance claim for my medication Adalimumab (Humira, 2 Pen,) 40 MG/0.4ML PNKT to be filled.\"    Please follow up, thank you.      [facility] [phone]    Fax [phone]    "

## 2024-10-16 ENCOUNTER — TELEPHONE (OUTPATIENT)
Age: 39
End: 2024-10-16

## 2024-10-16 NOTE — TELEPHONE ENCOUNTER
"Pt transferred to nurses line.     Reports she sent [employer] message and called insurance and [facility] pharmacy. She is asking office to call [phone] to get a \"JOSE\" from the doctor and need to call insurance and then [facility] in order to have medication shipped by saturday. She is asking for this to be done today so medication will be shipped by Friday.     I advised would send message to Fawn to work on for her. Pt would like a call back.   "

## 2024-10-17 ENCOUNTER — TELEPHONE (OUTPATIENT)
Dept: GASTROENTEROLOGY | Facility: CLINIC | Age: 39
End: 2024-10-17

## 2024-10-17 ENCOUNTER — TELEPHONE (OUTPATIENT)
Age: 39
End: 2024-10-17

## 2024-10-17 NOTE — TELEPHONE ENCOUNTER
Patient had appt scheduled for 3:45 today. Called patient to see if she could come in earlier due to cancellation. She stated she thought she cancelled appt.   Asked if she would like to reschedule, she will call back.

## 2024-10-25 ENCOUNTER — TELEPHONE (OUTPATIENT)
Dept: GASTROENTEROLOGY | Facility: CLINIC | Age: 39
End: 2024-10-25

## 2024-10-25 ENCOUNTER — OFFICE VISIT (OUTPATIENT)
Dept: GASTROENTEROLOGY | Facility: CLINIC | Age: 39
End: 2024-10-25
Payer: COMMERCIAL

## 2024-10-25 VITALS
HEIGHT: 63 IN | BODY MASS INDEX: 39.69 KG/M2 | DIASTOLIC BLOOD PRESSURE: 81 MMHG | WEIGHT: 224 LBS | TEMPERATURE: 97 F | SYSTOLIC BLOOD PRESSURE: 125 MMHG | OXYGEN SATURATION: 98 % | HEART RATE: 78 BPM

## 2024-10-25 DIAGNOSIS — K50.90 CROHN'S DISEASE WITHOUT COMPLICATION, UNSPECIFIED GASTROINTESTINAL TRACT LOCATION (HCC): Primary | ICD-10-CM

## 2024-10-25 PROCEDURE — 99214 OFFICE O/P EST MOD 30 MIN: CPT | Performed by: PHYSICIAN ASSISTANT

## 2024-10-25 RX ORDER — PANTOPRAZOLE SODIUM 40 MG/1
40 TABLET, DELAYED RELEASE ORAL DAILY
Qty: 30 TABLET | Refills: 1 | Status: SHIPPED | OUTPATIENT
Start: 2024-10-25

## 2024-10-25 NOTE — PROGRESS NOTES
[facility] - Outpatient Follow-up Note  Ju Stacy 39 y.o. female MRN: [id number]  Encounter: [id number]          ASSESSMENT AND PLAN:      1. Crohn's disease    Patient presents for follow up of her recent diagnosis of crohn's disease. She underwent an evaluation for rectal bleeding, diarrhea, and abdominal pain.  Colonoscopy 5/2 showed a terminal ileitis c/w crohn's disease.  CT enterography 5/23 showed a 14cm area of crohn's disease in the small bowel; no fistula, abscess, or stricture.  CRP 10.5. C diff negtive. Fecal calprotectin 514. Chronic hepatitis panel negative. TB quantiferon negative.  She was given a Budesonide course but then had a rash so she stopped it.  She was started on Humira in June but did initially have an issue with a pen misfiring which delayed treatment.  She was in the ER 8/7 due to symptoms and CT Scan A/P showed active terminal ileitis.  She was started on a Prednisone taper at 40mg po daily with decrease of 10 mg per week at that time which she completed.  Adalimumab level checked 8/30 came back low at 4.8; no anti-adalimumab abs.  Her Humira was then increased to 40mg SQ weekly in September.  She reports some BM irregularities and discomfort at times but no nocturnal diarrhea.  She reports her symptoms are less.  She is struggling with more reflux/GERD symptoms.    Continue Humira 40mg SQ weekly for her crohn's disease.  Will give a PPI course for for her GERD/gastritis with Pantoprazole 40mg po daily x 8 weeks.  Message sent to our IBD team to help patient obtain an extra pen as she reports she is short one.  Will repeat labs in a month: CBC, CMP, CRP, and a trough Adalimumab level to ensure an adequate drug level.  Will also repeat a fecal calprotectin to trend.  Annual eye exams, gyn exams, and dermatology/skin evaluations.  Vaccinations recommended per guidelines (Pneumococcal, Shingrix, and annual influenza). No live vaccines.  Follow up in  2 months.  ______________________________________________________________________    SUBJECTIVE:  Patient is a pleasant 39 year old female who presents to the office for follow up of her crohn's disease.  Her Humira was increased to weekly dosing.  She reports some intermittent BM irregularities with alternating constipation and diarrhea.  She has some discomfort.  Overall her symptoms have lessened.  No nocturnal symptoms.  She has been having more reflux/GERD symptoms.      REVIEW OF SYSTEMS IS OTHERWISE NEGATIVE.      Historical Information   Past Medical History:   Diagnosis Date    Abdominal hernia     Crohn's colitis (HCC)     Disease of thyroid gland     Stress incontinence     Varicella     as child      Past Surgical History:   Procedure Laterality Date    COLPOSCOPY  2002    GYNECOLOGIC CRYOSURGERY  2002     Social History   Social History     Substance and Sexual Activity   Alcohol Use Not Currently    Alcohol/week: 1.0 standard drink of alcohol    Types: 1 Standard drinks or equivalent per week     Social History     Substance and Sexual Activity   Drug Use Never     Social History     Tobacco Use   Smoking Status Never   Smokeless Tobacco Never     Family History   Problem Relation Age of Onset    Asthma Mother     Hypertension Father     Hyperlipidemia Father     Colon cancer Maternal Uncle     No Known Problems Sister     No Known Problems Brother     No Known Problems Son     Heart disease Maternal Grandmother     Alzheimer's disease Maternal Grandfather     No Known Problems Sister        Meds/Allergies       Current Outpatient Medications:     Adalimumab (Humira, 2 Pen,) 40 MG/0.4ML PNKT    levothyroxine 50 mcg tablet    pantoprazole (PROTONIX) 40 mg tablet    dicyclomine (BENTYL) 20 mg tablet    ondansetron (ZOFRAN-ODT) 4 mg disintegrating tablet    predniSONE 10 mg tablet    Allergies   Allergen Reactions    Budesonide Rash           Objective     Blood pressure 125/81, pulse 78, temperature (!)  "97 °F (36.1 °C), temperature source Temporal, height 5' 3\" (1.6 m), weight 102 kg (224 lb), SpO2 98%, not currently breastfeeding. Body mass index is 39.68 kg/m².      PHYSICAL EXAM:      General Appearance:   Alert, cooperative, no distress   HEENT:   Normocephalic, atraumatic, anicteric.     Neck:  Supple, symmetrical, trachea midline   Lungs:   Clear to auscultation bilaterally; no rales, rhonchi or wheezing; respirations unlabored    Heart::   Regular rate and rhythm; no murmur, rub, or gallop.   Abdomen:   Soft, non-tender, non-distended; normal bowel sounds; no masses, no organomegaly    Genitalia:   Deferred    Rectal:   Deferred    Extremities:  No cyanosis, clubbing or edema    Pulses:  2+ and symmetric    Skin:  No jaundice, rashes, or lesions    Lymph nodes:  No palpable cervical lymphadenopathy        Lab Results:   No visits with results within 1 Day(s) from this visit.   Latest known visit with results is:   Appointment on 08/30/2024   Component Date Value    WBC 08/30/2024 8.73     RBC 08/30/2024 4.67     Hemoglobin 08/30/2024 13.4     Hematocrit 08/30/2024 42.3     MCV 08/30/2024 91     MCH 08/30/2024 28.7     MCHC 08/30/2024 31.7     RDW 08/30/2024 14.4     Platelets 08/30/2024 333     MPV 08/30/2024 10.6     Sodium 08/30/2024 135     Potassium 08/30/2024 3.8     Chloride 08/30/2024 101     CO2 08/30/2024 24     ANION GAP 08/30/2024 10     BUN 08/30/2024 12     Creatinine 08/30/2024 1.11     Glucose 08/30/2024 84     Calcium 08/30/2024 9.1     AST 08/30/2024 36     ALT 08/30/2024 71 (H)     Alkaline Phosphatase 08/30/2024 61     Total Protein 08/30/2024 7.1     Albumin 08/30/2024 4.2     Total Bilirubin 08/30/2024 0.39     eGFR 08/30/2024 62     CRP 08/30/2024 3.0 (H)     ADALIMUMAB DRUG LEVEL 08/30/2024 4.8     ANTI-ADALIMUMAB ANTIBODY 08/30/2024 <25          Radiology Results:   No results found.  "

## 2024-11-20 ENCOUNTER — TELEPHONE (OUTPATIENT)
Age: 39
End: 2024-11-20

## 2024-11-21 NOTE — TELEPHONE ENCOUNTER
Called patient lmom to schedule sooner appointment. I offered patient  Tuesday,11/26/24, at 8:30 or 11:30. Jeni suggested to put patient in URGENT spot if necessary and we can also do  virtual visit. Jeni would like to see her asap. Thank you.

## 2024-12-12 ENCOUNTER — TELEPHONE (OUTPATIENT)
Age: 39
End: 2024-12-12

## 2024-12-12 NOTE — TELEPHONE ENCOUNTER
Pt would like to move forward with alternative biologic. States Humira caused itchy rash/bubbling at injection site after last use on Sat. Pt has intermittent stabbing pain at site. No other symptoms.     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 She would like to see Jeni Dye PA-C to discuss options. Offered sooner urgent appt per previous Telephone Calls and Patient Messages. Pt scheduled 1/9/25. Pt will keep transfer of care appt with Dr. Nevarez.

## 2024-12-12 NOTE — TELEPHONE ENCOUNTER
Pt calling re: her Humira medication she is having symptoms, nausea. I warm transfer to gi triage nurse Joan.

## 2024-12-12 NOTE — TELEPHONE ENCOUNTER
Patients GI provider:  ELAINE Dye    Number to return call: [phone]    Reason for call: Pt transferring from ELAINE Dye to Dr. Nevarez.    Scheduled procedure/appointment date if applicable: Apt 2/25/25

## 2024-12-13 NOTE — TELEPHONE ENCOUNTER
Called patient - lmom offering patient a sooner appointment for next week, Wednesday the 18th, with Jeni Dye

## 2024-12-18 ENCOUNTER — OFFICE VISIT (OUTPATIENT)
Age: 39
End: 2024-12-18
Payer: COMMERCIAL

## 2024-12-18 ENCOUNTER — TELEPHONE (OUTPATIENT)
Age: 39
End: 2024-12-18

## 2024-12-18 VITALS
HEIGHT: 63 IN | WEIGHT: 232 LBS | BODY MASS INDEX: 41.11 KG/M2 | HEART RATE: 68 BPM | SYSTOLIC BLOOD PRESSURE: 140 MMHG | OXYGEN SATURATION: 97 % | DIASTOLIC BLOOD PRESSURE: 80 MMHG

## 2024-12-18 DIAGNOSIS — K50.00 CROHN'S DISEASE OF SMALL INTESTINE WITHOUT COMPLICATION (HCC): Primary | ICD-10-CM

## 2024-12-18 DIAGNOSIS — K50.90 CROHN'S DISEASE WITHOUT COMPLICATION, UNSPECIFIED GASTROINTESTINAL TRACT LOCATION (HCC): ICD-10-CM

## 2024-12-18 PROCEDURE — 99214 OFFICE O/P EST MOD 30 MIN: CPT | Performed by: PHYSICIAN ASSISTANT

## 2024-12-18 RX ORDER — PANTOPRAZOLE SODIUM 40 MG/1
40 TABLET, DELAYED RELEASE ORAL DAILY
Qty: 30 TABLET | Refills: 1 | Status: SHIPPED | OUTPATIENT
Start: 2024-12-18

## 2024-12-18 NOTE — PROGRESS NOTES
[facility] - Outpatient Follow-up Note  Ju Stacy 39 y.o. female MRN: [id number]  Encounter: [id number]          ASSESSMENT AND PLAN:      1. Crohn's disease of small intestine    Patient presents for follow up of her crohn's disease. She previously underwent an evaluation for rectal bleeding, diarrhea, and abdominal pain.  Colonoscopy 5/2 showed a terminal ileitis c/w crohn's disease.  CT enterography 5/23 showed a 14cm area of crohn's disease in the small bowel; no fistula, abscess, or stricture.  CRP 10.5. C diff negtive. Fecal calprotectin 514. Chronic hepatitis panel negative. TB quantiferon negative.  She was started on Humira in the summer and increased more recently to weekly in September due to a low Adalimumab level but reports she is not doing well with the injections and struggling with the development of injection site reactions/rash/pain.  She presents to discuss other biologic treatment options.  We talked about different treatment options at the  such as Skyrizi, Stelara, Remicade, Entyvio, Rinvoq.      She is agreeable to begin treatment with Skyrizi. Will begin Skyrizi induction infusions 600mg IV on week 0, 4, 8 and then maintenance treatment with 180mg SC injections q8wk; starting 4 weeks after last induction dose.  We reviewed side effects: increased risk of infection, hepatotoxicity, etc. Will have our IBD team work on authorization for the medication ASAP.  Will check CBC, CMP, CRP, after the first infusion.  Continue the Pantoprazole course for her GERD.  Annual eye exams, gyn exams, and dermatology/skin evaluations.  Vaccinations recommended per guidelines (Pneumococcal, Shingrix, and annual influenza). No live vaccines.  She has follow up in February with Dr. Nevarez.  ______________________________________________________________________    SUBJECTIVE:  Patient is a pleasant 39 year old female who presents to the office for follow up of her crohn's  "disease.  She presents to discuss other biologic treatment options as she reports she is not tolerating the Humira well.  She reports with the weekly injections she has developed issues with pain/rash/itching at the injection sites. She reports bloating and alternating constipation/diarrhea.  The pantoprazole course has helped her GERD symptoms/nausea.        REVIEW OF SYSTEMS IS OTHERWISE NEGATIVE.      Historical Information   Past Medical History:   Diagnosis Date    Abdominal hernia     Crohn's colitis (HCC)     Disease of thyroid gland     Stress incontinence     Varicella     as child      Past Surgical History:   Procedure Laterality Date    COLPOSCOPY  2002    GYNECOLOGIC CRYOSURGERY  2002     Social History   Social History     Substance and Sexual Activity   Alcohol Use Not Currently    Alcohol/week: 1.0 standard drink of alcohol    Types: 1 Standard drinks or equivalent per week     Social History     Substance and Sexual Activity   Drug Use Never     Social History     Tobacco Use   Smoking Status Never   Smokeless Tobacco Never     Family History   Problem Relation Age of Onset    Asthma Mother     Hypertension Father     Hyperlipidemia Father     Colon cancer Maternal Uncle     No Known Problems Sister     No Known Problems Brother     No Known Problems Son     Heart disease Maternal Grandmother     Alzheimer's disease Maternal Grandfather     No Known Problems Sister        Meds/Allergies       Current Outpatient Medications:     levothyroxine 50 mcg tablet    pantoprazole (PROTONIX) 40 mg tablet    Adalimumab (Humira, 2 Pen,) 40 MG/0.4ML PNKT    dicyclomine (BENTYL) 20 mg tablet    ondansetron (ZOFRAN-ODT) 4 mg disintegrating tablet    predniSONE 10 mg tablet    Allergies   Allergen Reactions    Budesonide Rash           Objective     Blood pressure 140/80, pulse 68, height 5' 3\" (1.6 m), weight 105 kg (232 lb), SpO2 97%, not currently breastfeeding. Body mass index is 41.1 kg/m².      PHYSICAL EXAM: "      General Appearance:   Alert, cooperative, no distress   HEENT:   Normocephalic, atraumatic, anicteric.     Neck:  Supple, symmetrical, trachea midline   Lungs:   Clear to auscultation bilaterally; no rales, rhonchi or wheezing; respirations unlabored    Heart::   Regular rate and rhythm; no murmur, rub, or gallop.   Abdomen:   Soft, non-tender, non-distended; normal bowel sounds; no masses, no organomegaly    Genitalia:   Deferred    Rectal:   Deferred    Extremities:  No cyanosis, clubbing or edema    Pulses:  2+ and symmetric    Skin:  No jaundice, rashes, or lesions    Lymph nodes:  No palpable cervical lymphadenopathy        Lab Results:   No visits with results within 1 Day(s) from this visit.   Latest known visit with results is:   Appointment on 08/30/2024   Component Date Value    WBC 08/30/2024 8.73     RBC 08/30/2024 4.67     Hemoglobin 08/30/2024 13.4     Hematocrit 08/30/2024 42.3     MCV 08/30/2024 91     MCH 08/30/2024 28.7     MCHC 08/30/2024 31.7     RDW 08/30/2024 14.4     Platelets 08/30/2024 333     MPV 08/30/2024 10.6     Sodium 08/30/2024 135     Potassium 08/30/2024 3.8     Chloride 08/30/2024 101     CO2 08/30/2024 24     ANION GAP 08/30/2024 10     BUN 08/30/2024 12     Creatinine 08/30/2024 1.11     Glucose 08/30/2024 84     Calcium 08/30/2024 9.1     AST 08/30/2024 36     ALT 08/30/2024 71 (H)     Alkaline Phosphatase 08/30/2024 61     Total Protein 08/30/2024 7.1     Albumin 08/30/2024 4.2     Total Bilirubin 08/30/2024 0.39     eGFR 08/30/2024 62     CRP 08/30/2024 3.0 (H)     ADALIMUMAB DRUG LEVEL 08/30/2024 4.8     ANTI-ADALIMUMAB ANTIBODY 08/30/2024 <25          Radiology Results:   No results found.

## 2024-12-19 ENCOUNTER — TELEPHONE (OUTPATIENT)
Age: 39
End: 2024-12-19

## 2024-12-19 NOTE — TELEPHONE ENCOUNTER
Jeni Dye PA-C  [facility] hours ago (4:34 PM)       Can we please seek authorization for Skyrizi induction infusions 600mg IV on week 0, 4, 8 and then maintenance treatment with 180mg SC injections q8wk; starting 4 weeks after last induction dose for this patient for her crohn's disease ASAP.  She failed/not tolerating weekly Humira. Thank you!!

## 2024-12-23 DIAGNOSIS — K50.80 CROHN'S DISEASE OF BOTH SMALL AND LARGE INTESTINE WITHOUT COMPLICATION (HCC): Primary | ICD-10-CM

## 2024-12-23 RX ORDER — DEXTROSE MONOHYDRATE 50 MG/ML
20 INJECTION, SOLUTION INTRAVENOUS ONCE
OUTPATIENT
Start: 2024-12-27

## 2024-12-23 RX ORDER — METHYLPREDNISOLONE SODIUM SUCCINATE 40 MG/ML
40 INJECTION, POWDER, LYOPHILIZED, FOR SOLUTION INTRAMUSCULAR; INTRAVENOUS ONCE
OUTPATIENT
Start: 2024-12-27

## 2024-12-23 RX ORDER — ACETAMINOPHEN 325 MG/1
650 TABLET ORAL ONCE
OUTPATIENT
Start: 2024-12-27

## 2024-12-23 RX ORDER — DIPHENHYDRAMINE HCL 25 MG
25 TABLET ORAL ONCE
OUTPATIENT
Start: 2024-12-27

## 2024-12-23 NOTE — TELEPHONE ENCOUNTER
Medication: Skyrizi   Directions: Infuse 600mg on weeks 0,4 and 8  Pt weight: 105kg  Quantity: 600mg  Day Supply: 28  Insurance: [employer]  How Prior Auth was submitted: Fax  Authorization Date range: 12/19/2024 - 6/16/2025  Authorization Number: [id number]  Pharmacy that fills med: [facility]  Infusion Center:  [facility]  Patient aware of approval:     Letter in Media

## 2024-12-31 ENCOUNTER — HOSPITAL ENCOUNTER (OUTPATIENT)
Dept: INFUSION CENTER | Facility: CLINIC | Age: 39
Discharge: HOME | End: 2024-12-31
Payer: COMMERCIAL

## 2024-12-31 VITALS
DIASTOLIC BLOOD PRESSURE: 95 MMHG | HEART RATE: 61 BPM | RESPIRATION RATE: 18 BRPM | SYSTOLIC BLOOD PRESSURE: 139 MMHG | TEMPERATURE: 96.9 F

## 2024-12-31 DIAGNOSIS — K50.80 CROHN'S DISEASE OF BOTH SMALL AND LARGE INTESTINE WITHOUT COMPLICATION (HCC): Primary | ICD-10-CM

## 2024-12-31 RX ORDER — DIPHENHYDRAMINE HCL 25 MG
25 TABLET ORAL ONCE
OUTPATIENT
Start: 2025-01-28

## 2024-12-31 RX ORDER — ACETAMINOPHEN 325 MG/1
650 TABLET ORAL ONCE
OUTPATIENT
Start: 2025-01-28

## 2024-12-31 RX ORDER — DEXTROSE MONOHYDRATE 50 MG/ML
20 INJECTION, SOLUTION INTRAVENOUS ONCE
OUTPATIENT
Start: 2025-01-28

## 2024-12-31 RX ORDER — DIPHENHYDRAMINE HCL 25 MG
25 TABLET ORAL ONCE
Status: COMPLETED | OUTPATIENT
Start: 2024-12-31 | End: 2024-12-31

## 2024-12-31 RX ORDER — METHYLPREDNISOLONE SODIUM SUCCINATE 40 MG/ML
40 INJECTION, POWDER, LYOPHILIZED, FOR SOLUTION INTRAMUSCULAR; INTRAVENOUS ONCE
Status: COMPLETED | OUTPATIENT
Start: 2024-12-31 | End: 2024-12-31

## 2024-12-31 RX ORDER — METHYLPREDNISOLONE SODIUM SUCCINATE 40 MG/ML
40 INJECTION, POWDER, LYOPHILIZED, FOR SOLUTION INTRAMUSCULAR; INTRAVENOUS ONCE
OUTPATIENT
Start: 2025-01-28

## 2024-12-31 RX ORDER — DEXTROSE MONOHYDRATE 50 MG/ML
20 INJECTION, SOLUTION INTRAVENOUS ONCE
Status: COMPLETED | OUTPATIENT
Start: 2024-12-31 | End: 2024-12-31

## 2024-12-31 RX ORDER — ACETAMINOPHEN 325 MG/1
650 TABLET ORAL ONCE
Status: COMPLETED | OUTPATIENT
Start: 2024-12-31 | End: 2024-12-31

## 2024-12-31 RX ADMIN — DIPHENHYDRAMINE HYDROCHLORIDE 25 MG: 25 TABLET ORAL at 12:11

## 2024-12-31 RX ADMIN — METHYLPREDNISOLONE SODIUM SUCCINATE 40 MG: 40 INJECTION, POWDER, FOR SOLUTION INTRAMUSCULAR; INTRAVENOUS at 12:10

## 2024-12-31 RX ADMIN — ACETAMINOPHEN 650 MG: 325 TABLET, FILM COATED ORAL at 12:11

## 2024-12-31 RX ADMIN — DEXTROSE 600 MG: 5 SOLUTION INTRAVENOUS at 12:52

## 2024-12-31 RX ADMIN — DEXTROSE 20 ML/HR: 5 SOLUTION INTRAVENOUS at 12:10

## 2024-12-31 NOTE — PROGRESS NOTES
Pt presents for skyrizi offering no compliants. Pt stated no recent infection or antibiotic treatment. Pt tolerated treatment without incident. PIV removed. AVS declined. Next appointment reviewed on 1/28 at 3pm

## 2025-01-02 ENCOUNTER — TELEPHONE (OUTPATIENT)
Dept: GASTROENTEROLOGY | Facility: CLINIC | Age: 40
End: 2025-01-02

## 2025-01-02 NOTE — TELEPHONE ENCOUNTER
Received From [employer] approval for HUMIRA. 7/13/2024 to 9/12/2025. Will fax to [facility].. then scan into patients chart.

## 2025-01-09 ENCOUNTER — APPOINTMENT (EMERGENCY)
Dept: RADIOLOGY | Facility: HOSPITAL | Age: 40
End: 2025-01-09
Payer: COMMERCIAL

## 2025-01-09 ENCOUNTER — HOSPITAL ENCOUNTER (EMERGENCY)
Facility: HOSPITAL | Age: 40
Discharge: HOME | End: 2025-01-09
Attending: EMERGENCY MEDICINE
Payer: COMMERCIAL

## 2025-01-09 VITALS
SYSTOLIC BLOOD PRESSURE: 166 MMHG | WEIGHT: 200 LBS | DIASTOLIC BLOOD PRESSURE: 84 MMHG | BODY MASS INDEX: 35.43 KG/M2 | TEMPERATURE: 98 F | RESPIRATION RATE: 18 BRPM | HEART RATE: 83 BPM | OXYGEN SATURATION: 98 %

## 2025-01-09 DIAGNOSIS — K50.80 CROHN'S DISEASE OF BOTH SMALL AND LARGE INTESTINE WITHOUT COMPLICATION (HCC): Primary | ICD-10-CM

## 2025-01-09 DIAGNOSIS — M54.9 BACK PAIN: Primary | ICD-10-CM

## 2025-01-09 PROCEDURE — 72100 X-RAY EXAM L-S SPINE 2/3 VWS: CPT

## 2025-01-09 PROCEDURE — 99284 EMERGENCY DEPT VISIT MOD MDM: CPT | Performed by: EMERGENCY MEDICINE

## 2025-01-09 PROCEDURE — 99283 EMERGENCY DEPT VISIT LOW MDM: CPT

## 2025-01-09 PROCEDURE — 96372 THER/PROPH/DIAG INJ SC/IM: CPT

## 2025-01-09 RX ORDER — IBUPROFEN 800 MG/1
800 TABLET, FILM COATED ORAL 3 TIMES DAILY
Qty: 21 TABLET | Refills: 0 | Status: SHIPPED | OUTPATIENT
Start: 2025-01-09 | End: 2025-01-24 | Stop reason: ALTCHOICE

## 2025-01-09 RX ORDER — KETOROLAC TROMETHAMINE 30 MG/ML
15 INJECTION, SOLUTION INTRAMUSCULAR; INTRAVENOUS ONCE
Status: COMPLETED | OUTPATIENT
Start: 2025-01-09 | End: 2025-01-09

## 2025-01-09 RX ORDER — METHOCARBAMOL 500 MG/1
500 TABLET, FILM COATED ORAL 2 TIMES DAILY
Qty: 20 TABLET | Refills: 0 | Status: SHIPPED | OUTPATIENT
Start: 2025-01-09 | End: 2025-01-10

## 2025-01-09 RX ADMIN — KETOROLAC TROMETHAMINE 15 MG: 30 INJECTION, SOLUTION INTRAMUSCULAR; INTRAVENOUS at 16:24

## 2025-01-10 ENCOUNTER — TELEPHONE (OUTPATIENT)
Age: 40
End: 2025-01-10

## 2025-01-10 ENCOUNTER — TELEPHONE (OUTPATIENT)
Dept: PAIN MEDICINE | Facility: CLINIC | Age: 40
End: 2025-01-10

## 2025-01-10 ENCOUNTER — CONSULT (OUTPATIENT)
Dept: PAIN MEDICINE | Facility: CLINIC | Age: 40
End: 2025-01-10
Payer: COMMERCIAL

## 2025-01-10 VITALS — WEIGHT: 200 LBS | HEIGHT: 63 IN | BODY MASS INDEX: 35.44 KG/M2

## 2025-01-10 DIAGNOSIS — M54.42 ACUTE BILATERAL LOW BACK PAIN WITH BILATERAL SCIATICA: Primary | ICD-10-CM

## 2025-01-10 DIAGNOSIS — M54.9 BACK PAIN: ICD-10-CM

## 2025-01-10 DIAGNOSIS — S39.012A STRAIN OF LUMBAR REGION, INITIAL ENCOUNTER: ICD-10-CM

## 2025-01-10 DIAGNOSIS — M54.41 ACUTE BILATERAL LOW BACK PAIN WITH BILATERAL SCIATICA: Primary | ICD-10-CM

## 2025-01-10 PROCEDURE — 99204 OFFICE O/P NEW MOD 45 MIN: CPT | Performed by: STUDENT IN AN ORGANIZED HEALTH CARE EDUCATION/TRAINING PROGRAM

## 2025-01-10 RX ORDER — CYCLOBENZAPRINE HCL 10 MG
10 TABLET ORAL 3 TIMES DAILY PRN
COMMUNITY
Start: 2025-01-06 | End: 2025-01-10 | Stop reason: ALTCHOICE

## 2025-01-10 RX ORDER — METHOCARBAMOL 750 MG/1
750 TABLET, FILM COATED ORAL 2 TIMES DAILY PRN
Qty: 60 TABLET | Refills: 0 | Status: SHIPPED | OUTPATIENT
Start: 2025-01-10

## 2025-01-10 RX ORDER — LIDOCAINE 50 MG/G
1 PATCH TOPICAL DAILY
Qty: 10 PATCH | Refills: 0 | Status: SHIPPED | OUTPATIENT
Start: 2025-01-10

## 2025-01-10 NOTE — TELEPHONE ENCOUNTER
PA for LIDOCAINE 5%SUBMITTED to  [employer]    via    [x]CMM-KEY:[id number]  []Surescripts-Case ID #    []Availity-Auth ID #  NDC #    []Faxed to plan   []Other website    []Phone call Case ID #      [x]PA sent as URGENT    All office notes, labs and other pertaining documents and studies sent. Clinical questions answered. Awaiting determination from insurance company.     Turnaround time for your insurance to make a decision on your Prior Authorization can take 7-21 business days.

## 2025-01-10 NOTE — TELEPHONE ENCOUNTER
Caller: Pharmacy    Doctor: SP    Reason for call: Prior auth needed  Lidocaine (Lidoderm) 5 %  Call back#:   Pharmacy  [id number] - [address] [place] [id number]  Phone: [phone]  Fax: [phone]  ABHINAV #: --

## 2025-01-10 NOTE — TELEPHONE ENCOUNTER
S/w pt asked pt if she could come in for a 9am consult, patient was at work and couldn't make the appt advised pt I would call if we get any cancellations this afternoon

## 2025-01-10 NOTE — ED PROVIDER NOTES
"Time reflects when diagnosis was documented in both MDM as applicable and the Disposition within this note       Time User Action Codes Description Comment    1/9/2025  6:09 PM Jah Melton Add [M54.9] Back pain           ED Disposition       ED Disposition   Discharge    Condition   Stable    Date/Time   Thu Jan 9, 2025  6:09 PM    Comment   Ju Stacy discharge to home/self care.                   Assessment & Plan       Medical Decision Making  39-year-old female presenting to the emergency department for evaluation of low back pain.  Patient has low back pain that radiates down her left leg not associated with numbness weakness tingling bowel or bladder incontinence.      Amount and/or Complexity of Data Reviewed  Radiology: ordered.    Risk  Prescription drug management.             Medications   ketorolac (TORADOL) injection 15 mg (15 mg Intramuscular Given 1/9/25 1624)       ED Risk Strat Scores                                              History of Present Illness       Chief Complaint   Patient presents with    Back Pain     Stated she \" tweaked \" her back on Sunday, saw chiropractor 2 x this week, + low back stiffness, non radiating. Denies difficulty urinating/ Bms       Past Medical History:   Diagnosis Date    Abdominal hernia     Crohn's colitis (HCC)     Disease of thyroid gland     Stress incontinence     Varicella     as child       Past Surgical History:   Procedure Laterality Date    COLPOSCOPY  2002    GYNECOLOGIC CRYOSURGERY  2002      Family History   Problem Relation Age of Onset    Asthma Mother     Hypertension Father     Hyperlipidemia Father     Colon cancer Maternal Uncle     No Known Problems Sister     No Known Problems Brother     No Known Problems Son     Heart disease Maternal Grandmother     Alzheimer's disease Maternal Grandfather     No Known Problems Sister       Social History     Tobacco Use    Smoking status: Never    Smokeless tobacco: Never   Vaping Use    Vaping " status: Never Used   Substance Use Topics    Alcohol use: Not Currently     Alcohol/week: 1.0 standard drink of alcohol     Types: 1 Standard drinks or equivalent per week    Drug use: Never      E-Cigarette/Vaping    E-Cigarette Use Never User       E-Cigarette/Vaping Substances    Nicotine No     THC No     CBD No     Flavoring No     Other No     Unknown No       I have reviewed and agree with the history as documented.     39-year-old female present to the emergency department for evaluation of back pain.  Patient has acute low back pain that she sustained after feeling something pull while doing some housework on Sunday, she states that she went to her chiropractor twice, tweaked it again today while moving awkwardly, it radiates down the left leg is not associated with any numbness weakness or tingling no fevers or chills no bowel or bladder incontinence or saddle anesthesia.        Review of Systems   Constitutional:  Negative for appetite change, chills, fatigue and fever.   HENT:  Negative for sneezing and sore throat.    Eyes:  Negative for visual disturbance.   Respiratory:  Negative for cough, choking, chest tightness, shortness of breath and wheezing.    Cardiovascular:  Negative for chest pain and palpitations.   Gastrointestinal:  Negative for abdominal pain, constipation, diarrhea, nausea and vomiting.   Genitourinary:  Negative for difficulty urinating and dysuria.   Musculoskeletal:  Positive for back pain.   Neurological:  Negative for dizziness, weakness, light-headedness, numbness and headaches.   All other systems reviewed and are negative.          Objective       ED Triage Vitals [01/09/25 1453]   Temperature Pulse Blood Pressure Respirations SpO2 Patient Position - Orthostatic VS   98 °F (36.7 °C) 83 166/84 18 98 % --      Temp Source Heart Rate Source BP Location FiO2 (%) Pain Score    Oral -- -- -- 8      Vitals      Date and Time Temp Pulse SpO2 Resp BP Pain Score FACES Pain Rating User    01/09/25 1624 -- -- -- -- -- 10 - Worst Possible Pain -- FB   01/09/25 1453 98 °F (36.7 °C) 83 98 % 18 166/84 8 -- AM            Physical Exam  Vitals and nursing note reviewed.   Constitutional:       General: She is not in acute distress.     Appearance: She is well-developed. She is not diaphoretic.   HENT:      Head: Normocephalic and atraumatic.   Eyes:      Pupils: Pupils are equal, round, and reactive to light.   Neck:      Vascular: No JVD.      Trachea: No tracheal deviation.   Cardiovascular:      Rate and Rhythm: Normal rate and regular rhythm.      Heart sounds: Normal heart sounds. No murmur heard.     No friction rub. No gallop.   Pulmonary:      Effort: Pulmonary effort is normal. No respiratory distress.      Breath sounds: Normal breath sounds. No wheezing or rales.   Abdominal:      General: Bowel sounds are normal. There is no distension.      Palpations: Abdomen is soft.      Tenderness: There is no abdominal tenderness. There is no guarding or rebound.   Skin:     General: Skin is warm and dry.      Coloration: Skin is not pale.   Neurological:      Mental Status: She is alert and oriented to person, place, and time.      Cranial Nerves: No cranial nerve deficit.      Motor: No abnormal muscle tone.   Psychiatric:         Behavior: Behavior normal.         Results Reviewed       None            XR spine lumbar 2 or 3 views injury   Final Interpretation by Oscar Ortiz MD (01/09 1717)      No acute osseous abnormality.         Computerized Assisted Algorithm (CAA) may have been used to analyze all applicable images.         Workstation performed: [id number]             Procedures    ED Medication and Procedure Management   Prior to Admission Medications   Prescriptions Last Dose Informant Patient Reported? Taking?   dicyclomine (BENTYL) 20 mg tablet  Self No No   Sig: Take 1 tablet (20 mg total) by mouth every 8 (eight) hours as needed (abdominal cramping or diarrhea)   Patient not taking:  Reported on 10/25/2024   levothyroxine 50 mcg tablet  Self No No   Sig: Take 1 tablet (50 mcg total) by mouth daily   ondansetron (ZOFRAN-ODT) 4 mg disintegrating tablet  Self No No   Sig: Take 1 tablet (4 mg total) by mouth every 8 (eight) hours as needed for nausea or vomiting   Patient not taking: Reported on 10/25/2024   pantoprazole (PROTONIX) 40 mg tablet   No No   Sig: Take 1 tablet (40 mg total) by mouth daily   predniSONE 10 mg tablet  Self No No   Si po daily for 1 week and then 1 po daily for 1 week.   Patient not taking: Reported on 10/25/2024      Facility-Administered Medications: None     Discharge Medication List as of 2025  6:11 PM        START taking these medications    Details   ibuprofen (MOTRIN) 800 mg tablet Take 1 tablet (800 mg total) by mouth 3 (three) times a day, Starting u 2025, Normal      methocarbamol (ROBAXIN) 500 mg tablet Take 1 tablet (500 mg total) by mouth 2 (two) times a day, Starting u 2025, Normal           CONTINUE these medications which have NOT CHANGED    Details   dicyclomine (BENTYL) 20 mg tablet Take 1 tablet (20 mg total) by mouth every 8 (eight) hours as needed (abdominal cramping or diarrhea), Starting 2024, Normal      levothyroxine 50 mcg tablet Take 1 tablet (50 mcg total) by mouth daily, Starting 2024, Normal      ondansetron (ZOFRAN-ODT) 4 mg disintegrating tablet Take 1 tablet (4 mg total) by mouth every 8 (eight) hours as needed for nausea or vomiting, Starting 2024, Normal      pantoprazole (PROTONIX) 40 mg tablet Take 1 tablet (40 mg total) by mouth daily, Starting 2024, Normal      predniSONE 10 mg tablet 2 po daily for 1 week and then 1 po daily for 1 week., Normal             ED SEPSIS DOCUMENTATION   Time reflects when diagnosis was documented in both MDM as applicable and the Disposition within this note       Time User Action Codes Description Comment    2025  6:09 PM Jah Melton Add [M54.9]  Back pain                  Jah Melton MD  01/10/25 8207

## 2025-01-10 NOTE — PROGRESS NOTES
Assessment:  1. Acute bilateral low back pain with bilateral sciatica    2. Strain of lumbar region, initial encounter    3. Back pain        Plan:  Orders Placed This Encounter   Procedures    MRI lumbar spine wo contrast     Standing Status:   Future     Expected Date:   1/10/2025     Expiration Date:   1/10/2029     Scheduling Instructions:      There is no preparation for this test. Please leave your jewelry and valuables at home, wedding rings are the exception. Magnetic nail polish must be removed prior to arrival for your test. Please bring your insurance cards, a form of photo ID and a list of your medications with you. Arrive 15       minutes prior to your appointment time in order to register. Please bring any prior CT or MRI studies of this area that were not performed at a [facility].            To schedule this appointment, please contact Central Scheduling at [phone].            Prior to your appointment, please make sure you complete the MRI Screening Form when you e-Check in for your appointment. This will be available starting 7 days before your appointment in [facility]. You may receive an e-mail with an activation code if you do not have a [facility] account. If you do not       have access to a device, we will complete your screening at your appointment.     What is the patient's sedation requirement?:   No Sedation     Does this procedure require the 3T MRI?:   No     Release to patient through [employer]:   Immediate     Is order priority selected as STAT?:   No     Reason for Exam:   low back pain with bilateral sciatica     Is the patient pregnant?:   Unknown    Ambulatory referral to Physical Therapy     Standing Status:   Future     Expiration Date:   1/10/2026     Referral Priority:   Routine     Referral Type:   Physical Therapy     Referral Reason:   Specialty Services Required     Requested Specialty:   Physical Therapy     Number of Visits Requested:   1     Expiration Date:    1/10/2026       New Medications Ordered This Visit   Medications    methocarbamol (ROBAXIN) 750 mg tablet     Sig: Take 1 tablet (750 mg total) by mouth 2 (two) times a day as needed for muscle spasms     Dispense:  60 tablet     Refill:  0    lidocaine (Lidoderm) 5 %     Sig: Apply 1 patch topically over 12 hours daily Remove & Discard patch within 12 hours or as directed by MD     Dispense:  10 patch     Refill:  0       My impressions and treatment recommendations were discussed in detail with the patient, who verbalized understanding and had no further questions.    39-year-old female presents her office with acute lower back pain left greater than right with bilateral sciatica.  Symptoms began about 5 days ago and prompted ED visit 1 day ago.  She has trialed chiropractic treatment with minimal relief of her symptoms and in fact reports worsening of her symptoms after chiropractic treatment.  She is neurologically intact on exam today but has noted restricted range of motion all planes secondary to pain.  Likely element of lumbar strain and possible disc related pathology.  X-ray of the lumbar spine negative for any significant degenerative pathology.  At this juncture, we will order MRI of the lumbar spine to assess for any significant soft tissue pathology that may be amenable to injection treatments.  Did discuss lumbar epidural steroid injection as a potential treatment option.    I am also going to increase Robaxin to 750 mg twice daily as needed.  She will let us know if the medication is not very helpful and we can look for alternatives. We will also order lidocaine patch.    She was also provided with lower back stretches and was given aquatic therapy referral as well.    [place] Prescription Drug Monitoring Program report was reviewed and was appropriate     Complete risks and benefits including bleeding, infection, tissue reaction, nerve injury and allergic reaction were discussed. The approach  was demonstrated using models and literature was provided. Verbal and written consent was obtained.    Discharge instructions were provided. I personally saw and examined the patient and I agree with the above discussed plan of care.    History of Present Illness:    Ju Stacy is a 39 y.o. female who presents to [facility] for initial evaluation of the above stated pain complaints. The patient has a past medical and chronic pain history as outlined in the assessment section. She was referred by Referral Self  No address on file .    Patient is here with chief complaint of left greater than right lower back pain which began about 5 days ago.  Pain is severe in intensity over the past month.  Currently 8 out of 10.  Pain is constant.  Worse in the morning.  Burning, shooting, sharp in nature.  Patient twisted and felt a pull in her back.    Pain is increased with bending, sitting, walking, coughing, sneezing, exercise.    Notable history of anxiety and Crohn's disease.    She reports moderately with heat/ice therapy and chiropractic manipulation.    Not allergic to latex or contrast dye.    Currently using acetaminophen.  In the past does use ibuprofen.    She reports she was turning to the right and felt a pop in the left side of the back and numbness and tingling in the anterior thighs. The numbness lasted just briefly.     She reports history of sciatica on the left but not today.     She reports some improvement with robaxin and ibuprofen since ED visit yesterday.     Review of Systems:    Review of Systems   Constitutional:  Positive for unexpected weight change.           Past Medical History:   Diagnosis Date    Abdominal hernia     Crohn's colitis (HCC)     Disease of thyroid gland     Stress incontinence     Varicella     as child        Past Surgical History:   Procedure Laterality Date    COLPOSCOPY  2002    GYNECOLOGIC CRYOSURGERY  2002       Family History   Problem  Relation Age of Onset    Asthma Mother     Hypertension Father     Hyperlipidemia Father     Colon cancer Maternal Uncle     No Known Problems Sister     No Known Problems Brother     No Known Problems Son     Heart disease Maternal Grandmother     Alzheimer's disease Maternal Grandfather     No Known Problems Sister        Social History     Occupational History    Not on file   Tobacco Use    Smoking status: Never    Smokeless tobacco: Never   Vaping Use    Vaping status: Never Used   Substance and Sexual Activity    Alcohol use: Not Currently     Alcohol/week: 1.0 standard drink of alcohol     Types: 1 Standard drinks or equivalent per week    Drug use: Never    Sexual activity: Yes     Partners: Male     Birth control/protection: None     Comment: Ann          Current Outpatient Medications:     ibuprofen (MOTRIN) 800 mg tablet, Take 1 tablet (800 mg total) by mouth 3 (three) times a day (Patient taking differently: Take 800 mg by mouth 3 (three) times a day PRN), Disp: 21 tablet, Rfl: 0    levothyroxine 50 mcg tablet, Take 1 tablet (50 mcg total) by mouth daily, Disp: 30 tablet, Rfl: 5    lidocaine (Lidoderm) 5 %, Apply 1 patch topically over 12 hours daily Remove & Discard patch within 12 hours or as directed by MD, Disp: 10 patch, Rfl: 0    methocarbamol (ROBAXIN) 750 mg tablet, Take 1 tablet (750 mg total) by mouth 2 (two) times a day as needed for muscle spasms, Disp: 60 tablet, Rfl: 0    pantoprazole (PROTONIX) 40 mg tablet, Take 1 tablet (40 mg total) by mouth daily, Disp: 30 tablet, Rfl: 1    dicyclomine (BENTYL) 20 mg tablet, Take 1 tablet (20 mg total) by mouth every 8 (eight) hours as needed (abdominal cramping or diarrhea) (Patient not taking: Reported on 10/25/2024), Disp: 30 tablet, Rfl: 0    ondansetron (ZOFRAN-ODT) 4 mg disintegrating tablet, Take 1 tablet (4 mg total) by mouth every 8 (eight) hours as needed for nausea or vomiting (Patient not taking: Reported on 10/25/2024), Disp: 30  "tablet, Rfl: 0  No current facility-administered medications for this visit.    Allergies   Allergen Reactions    Budesonide Rash       Physical Exam:    Ht 5' 3\" (1.6 m)   Wt 90.7 kg (200 lb)   BMI 35.43 kg/m²     Constitutional: normal, well developed, well nourished, alert, in no distress and non-toxic and no overt pain behavior.  Eyes: anicteric  HEENT: grossly intact  Neck: supple, symmetric, trachea midline and no masses   Pulmonary:even and unlabored  Cardiovascular:No edema or pitting edema present  Skin:Normal without rashes or lesions and well hydrated  Psychiatric:Mood and affect appropriate  Neurologic:Cranial Nerves II-XII grossly intact  Musculoskeletal:normal    Lumbar Spine Exam    Appearance:  Normal lordosis  Palpation/Tenderness:  Tender to palpation throughout the bilateral lumbar paraspinal musculature.  Sensory:  no sensory deficits noted  Range of Motion:  Severely limited range of motion all planes secondary to pain.  Motor Strength:  Left hip flexion:  5/5  Left hip extension:  5/5  Right hip flexion:  5/5  Right hip extension:  5/5  Left knee flexion:  5/5  Left knee extension:  5/5  Right knee flexion:  5/5  Right knee extension:  5/5  Left foot dorsiflexion:  5/5  Left foot plantar flexion:  5/5  Right foot dorsiflexion:  5/5  Right foot plantar flexion:  5/5  Reflexes:  Left Patellar:  2+   Right Patellar:  2+   Left Achilles:  2+   Right Achilles:  2+   Special Tests:  Left Straight Leg Test:  negative  Right Straight Leg Test:  negative        Imaging    XR SPINE LUMBAR 2 OR 3 VIEWS INJURY  1/09/25     INDICATION: back pain.     COMPARISON: None     FINDINGS:     No acute fracture. Intact pedicles.     Five non-rib-bearing lumbar vertebral bodies.     Normal alignment.     No significant degenerative changes.     Unremarkable soft tissues.     IMPRESSION:     No acute osseous abnormality.    MRI lumbar spine wo contrast    (Results Pending)       Orders Placed This Encounter "   Procedures    MRI lumbar spine wo contrast    Ambulatory referral to Physical Therapy

## 2025-01-10 NOTE — PATIENT INSTRUCTIONS
Patient Education     Stretching Exercises for Your Lower Body   About this topic   Stretching is a kind of exercise. When you stretch, you make one muscle or a group of muscles longer. Stretching has many benefits. It may be easier for you to move after you stretch. You may also be more flexible. When you stretch, you bring more blood flow to your muscles. It gets the muscles ready for other exercises. Stretching may also help you relax or prevent injury to your muscles.  General   Before starting with a program, ask your doctor if you are healthy enough to do these exercises. Your doctor may have you work with a , chiropractor, or physical therapist to make a safe exercise program to meet your needs.  Stretching Exercises   Stretching exercises keep your muscles flexible. They also stop them from getting tight. Start by doing each of these stretches 2 to 3 times. In order for your body to make changes, you will need to hold these stretches for 20 to 30 seconds. Try to do the stretches 2 to 3 times each day. Do all exercises slowly.  If you have balance problems, do not try standing stretches. There are other safe ways to stretch many muscles while sitting or lying down.  Single knee to chest stretches ? Lie on your back. Pull one knee towards your chest until you feel a stretch in your lower back and buttock area. Repeat with the other knee. If you have knee problems, pull your knee up by grabbing the back of your thigh instead of the front of your knee. You can also do this exercise by grabbing both knees at the same time.  Lower trunk rotations ? While lying on your back, bend your knees and have your feet flat on the floor. Keep your legs together and then drop them to one side. Be sure to keep both of your shoulders touching the floor until you feel a stretch in the muscles at the side of the back. Repeat on the other side.  Hip flexor stretches ? Lie on the right side of the bed. Drop your right leg  off the edge of the bed. Grab your left leg and pull your left knee to your chest. Now, bend your right knee back until you feel a stretch in the front of your thigh and hip. Change your position on the bed and repeat using the other leg.  Hamstring stretches seated ? Sit up straight on the edge of a chair. Make sure you keep your back straight. Straighten your knee on your left leg. Keep your heel on the floor. Bend forward at the waist towards your foot while keeping your upper back straight. Bend forward until you feel a stretch in the back of your thigh. Repeat on the other leg.  Thigh stretches standing ? Stand close to a wall or chair for balance. Bend one knee up and grab your foot behind you with the hand on the same side. Pull your foot closer to your back while bringing the hip backwards. You should feel a stretch at the front of your thigh, hip, and knee.  Calf stretches standing ? Stand about 12 to 18 inches (30 to 45 cm) away from a wall. Place your hands on the wall at shoulder level. Lean forward.  Knee straight - Stretch your left leg straight behind you. Make sure the left heel is flat on the floor and the left knee is straight. Now, bend the knee of the right leg until you feel a stretch in your left calf. Be sure that the heel does not come up. Repeat on the other side.  Knee bent - Take a small step forward with your left leg so your feet are slightly closer together. With your right leg bent, bend your left knee forward until you feel a stretch in the back of the calf of your left leg. This will feel strange, but it is the best way to stretch this calf muscle. Repeat on the other side.               What will the results be?   More flexible  Better posture  Prevent injury  Lower stress  Easier to walk and do other activities  Helpful tips   Stay active and work out to keep your muscles strong and flexible.  Keep a healthy weight so there is not extra stress on your joints. Eat a healthy diet to  keep your muscles healthy.  Be sure you do not hold your breath when exercising. This can raise your blood pressure. If you tend to hold your breath, try counting out loud when exercising. If any exercise bothers you, stop right away.  Always warm up before stretching. Heated muscles stretch much easier than cool muscles. Stretching cool muscles can lead to injury.  Try walking or cycling at an easy pace for a few minutes to warm up your muscles. Do this again after exercising.  Never bounce when doing stretches.  Doing exercises before a meal may be a good way to get into a routine.  Exercise may be slightly uncomfortable, but you should not have sharp pains. If you do get sharp pains, stop what you are doing. If the sharp pains continue, call your doctor.  Last Reviewed Date   2021-06-28  Consumer Information Use and Disclaimer   This generalized information is a limited summary of diagnosis, treatment, and/or medication information. It is not meant to be comprehensive and should be used as a tool to help the user understand and/or assess potential diagnostic and treatment options. It does NOT include all information about conditions, treatments, medications, side effects, or risks that may apply to a specific patient. It is not intended to be medical advice or a substitute for the medical advice, diagnosis, or treatment of a health care provider based on the health care provider's examination and assessment of a patient’s specific and unique circumstances. Patients must speak with a health care provider for complete information about their health, medical questions, and treatment options, including any risks or benefits regarding use of medications. This information does not endorse any treatments or medications as safe, effective, or approved for treating a specific patient. UpToDate, Inc. and its affiliates disclaim any warranty or liability relating to this information or the use thereof. The use of this  "information is governed by the Terms of Use, available at https://www.[employer].com/en/know/clinical-effectiveness-terms   Copyright   Copyright © 2024 UpToDate, Inc. and its affiliates and/or licensors. All rights reserved.    Patient Education     Epidural injection   The Basics   Written by the doctors and editors at [employer]   What is an epidural injection? -- An epidural injection can be used to treat a condition called \"radiculopathy.\" This is the medical term for the pain, weakness, numbness, or tingling that happens when nerves coming from the spinal cord get pinched or damaged.  The doctor injects medicines into the space outside the covering of the spinal cord (figure 1). This is similar to an \"epidural\" that is used for pain relief during labor and childbirth.  Epidural injections can be given into different parts of your back:   Cervical epidural injection - Used to help with pain in the head or arms.   Thoracic epidural injection - Used to help with pain in the upper or middle back.   Lumbar epidural injection - Used to help with pain in the lower back or legs.  How do I prepare for an epidural injection? -- The doctor or nurse will tell you if you need to do anything special to prepare. Before your procedure, your doctor will do an exam. They might send you to get tests, such as:   X-ray, ultrasound, or other imaging tests - Imaging tests create pictures of the inside of the body.  Your doctor will also ask you about your \"health history.\" This involves asking you questions about any health problems you have or had in the past, past surgeries, and any medicines you take. Tell them about:   Any medicines you are taking - This includes any prescription or \"over-the-counter\" medicines you use, plus any herbal supplements you take. It helps to write down and bring a list of any medicines you take, or bring a bag with all of your medicines with you.   Any allergies you have   Any bleeding problems you " "have - Certain medicines, including some herbs and supplements, can increase the risk of bleeding. Some health conditions also increase this risk.  You will also get information about:   Eating and drinking before your procedure - In some cases, you might need to \"fast\" before surgery. This means not eating or drinking anything for a period of time. In other cases, you might be allowed to have liquids until a short time before the procedure. Whether you need to fast, and for how long, depends on the procedure you are having.   What help you will need when you go home - For example, you might need to have someone else bring you home or stay with you for some time while you recover.  Ask the doctor or nurse if you have questions or if there is anything you do not understand.  What happens during an epidural injection? -- When it is time for the procedure:   You might get an \"IV,\" which is a thin tube that goes into a vein. This can be used to give you fluids and medicines.   You will get anesthesia medicines to numb the area where the doctor will give the injection. This is to make sure that you do not feel pain during the procedure. You might also get medicines to make you relax and feel sleepy, called \"sedatives.\"   The doctors and nurses will monitor your breathing, blood pressure, and heart rate during the procedure.   The doctor might use a continuous X-ray called \"fluoroscopy.\" This is to help make sure that the medicines are injected into the right place. The doctor might also inject a dye to see where to give the medicine.   The doctor will place a needle through your skin and inject the medicine into a space near your spine. Then, they will remove the needle and cover the area with a clean bandage.   The procedure takes 15 to 30 minutes.  What happens after an epidural injection? -- After your procedure, the staff will watch you closely for a short time. It might take a few days before you feel the effects of " "the epidural injection.  Before you go home, make sure that you know what problems to look out for and when to call the doctor. Make sure that you understand your doctor's or nurse's instructions. Ask questions about anything you do not understand.  For the rest of the day after your procedure:   Try to rest. Limit activities like exercise or driving.   The doctor might recommend an over-the-counter pain medicine. These include acetaminophen (sample brand name: Tylenol), ibuprofen (sample brand names: Advil, Motrin), and naproxen (sample brand name: Aleve).   Ice can help with pain and swelling. Put a cold gel pack, bag of ice, or bag of frozen vegetables on the injection site area every 1 to 2 hours, for 15 minutes each time. Put a thin towel between the ice (or other cold object) and the skin.  What are the risks of an epidural injection? -- Your doctor will talk to you about all of the possible risks, and answer your questions. Possible risks include:   Bleeding   Infection   Headache   Nerve injury  When should I call the doctor? -- Call for emergency help right away (in the [place] and [place], call 9-1-1) if:   You can't move your arms or legs.  Call for advice if:   You have a fever of 100.4°F (38°C) or higher, or chills.   You have redness or swelling around the injection site.   You have a headache.   Your arms or legs are numb, weak, or tingly.  All topics are updated as new evidence becomes available and our peer review process is complete.  This topic retrieved from [employer] on: May 15, 2024.  Topic 004769 Version 1.0  Release: 32.4.3 - C32.134  © 2024 UpToDate, Inc. and/or its affiliates. All rights reserved.  figure 1: Epidural injection     Duringan epidural injection, the doctor inserts a needle between 2 of the bones thatmake up the spine. Then, they inject medicines into the area around the spinalcord. This is an illustration of a \"lumbar\" epidural injection, which is given into the low back. The doctor " can place the needle in other areas to treat other types of pain.  Graphic 678635 Version 1.0  Consumer Information Use and Disclaimer   Disclaimer: This generalized information is a limited summary of diagnosis, treatment, and/or medication information. It is not meant to be comprehensive and should be used as a tool to help the user understand and/or assess potential diagnostic and treatment options. It does NOT include all information about conditions, treatments, medications, side effects, or risks that may apply to a specific patient. It is not intended to be medical advice or a substitute for the medical advice, diagnosis, or treatment of a health care provider based on the health care provider's examination and assessment of a patient's specific and unique circumstances. Patients must speak with a health care provider for complete information about their health, medical questions, and treatment options, including any risks or benefits regarding use of medications. This information does not endorse any treatments or medications as safe, effective, or approved for treating a specific patient. UpToDate, Inc. and its affiliates disclaim any warranty or liability relating to this information or the use thereof.The use of this information is governed by the Terms of Use, available at https://www.[employer]wer.com/en/know/clinical-effectiveness-terms. 2024© UpToDate, Inc. and its affiliates and/or licensors. All rights reserved.  Copyright   © 2024 UpToDate, Inc. and/or its affiliates. All rights reserved.

## 2025-01-14 NOTE — TELEPHONE ENCOUNTER
Caller: Adela Mckeon Aid     Doctor: Dr Schulte    Reason for call: Adela called in to check on the status of the Prior Auth for the medication Lidocaine . She was advised is still being reviewed     Call back#: [phone]

## 2025-01-16 ENCOUNTER — TELEPHONE (OUTPATIENT)
Age: 40
End: 2025-01-16

## 2025-01-16 ENCOUNTER — HOSPITAL ENCOUNTER (OUTPATIENT)
Dept: MRI IMAGING | Facility: HOSPITAL | Age: 40
End: 2025-01-16
Attending: STUDENT IN AN ORGANIZED HEALTH CARE EDUCATION/TRAINING PROGRAM
Payer: COMMERCIAL

## 2025-01-16 DIAGNOSIS — M54.41 ACUTE BILATERAL LOW BACK PAIN WITH BILATERAL SCIATICA: ICD-10-CM

## 2025-01-16 DIAGNOSIS — M54.9 BACK PAIN: ICD-10-CM

## 2025-01-16 DIAGNOSIS — M54.42 ACUTE BILATERAL LOW BACK PAIN WITH BILATERAL SCIATICA: ICD-10-CM

## 2025-01-16 DIAGNOSIS — E03.8 SUBCLINICAL HYPOTHYROIDISM: Primary | ICD-10-CM

## 2025-01-16 PROCEDURE — 72148 MRI LUMBAR SPINE W/O DYE: CPT

## 2025-01-16 NOTE — TELEPHONE ENCOUNTER
Pt called she is transferring care to L[facility] from Dr. Serrano. Pt asking for lab orders for her thyroid . She has a appt on 1/24/25 with Lidia.

## 2025-01-16 NOTE — TELEPHONE ENCOUNTER
Caller: Heidi    Doctor: Eris    Reason for call: Adela called in to check on the status of the Prior Auth for the medication Lidocaine .     Please advise.     Call back#: [phone]

## 2025-01-22 ENCOUNTER — APPOINTMENT (OUTPATIENT)
Dept: LAB | Facility: CLINIC | Age: 40
End: 2025-01-22
Payer: COMMERCIAL

## 2025-01-22 LAB
T4 FREE SERPL-MCNC: 0.75 NG/DL (ref 0.61–1.12)
TSH SERPL DL<=0.05 MIU/L-ACNC: 4.42 UIU/ML (ref 0.45–4.5)

## 2025-01-22 PROCEDURE — 84439 ASSAY OF FREE THYROXINE: CPT

## 2025-01-22 PROCEDURE — 84443 ASSAY THYROID STIM HORMONE: CPT

## 2025-01-22 PROCEDURE — 36415 COLL VENOUS BLD VENIPUNCTURE: CPT

## 2025-01-23 ENCOUNTER — RESULTS FOLLOW-UP (OUTPATIENT)
Age: 40
End: 2025-01-23

## 2025-01-24 ENCOUNTER — OFFICE VISIT (OUTPATIENT)
Age: 40
End: 2025-01-24
Payer: COMMERCIAL

## 2025-01-24 VITALS
SYSTOLIC BLOOD PRESSURE: 156 MMHG | HEIGHT: 63 IN | WEIGHT: 233 LBS | HEART RATE: 111 BPM | TEMPERATURE: 98.4 F | DIASTOLIC BLOOD PRESSURE: 82 MMHG | BODY MASS INDEX: 41.29 KG/M2 | OXYGEN SATURATION: 96 %

## 2025-01-24 DIAGNOSIS — E66.01 CLASS 2 SEVERE OBESITY DUE TO EXCESS CALORIES WITH SERIOUS COMORBIDITY AND BODY MASS INDEX (BMI) OF 39.0 TO 39.9 IN ADULT (HCC): ICD-10-CM

## 2025-01-24 DIAGNOSIS — E66.812 CLASS 2 SEVERE OBESITY DUE TO EXCESS CALORIES WITH SERIOUS COMORBIDITY AND BODY MASS INDEX (BMI) OF 39.0 TO 39.9 IN ADULT (HCC): ICD-10-CM

## 2025-01-24 DIAGNOSIS — E06.3 HYPOTHYROIDISM DUE TO HASHIMOTO THYROIDITIS: Primary | ICD-10-CM

## 2025-01-24 PROCEDURE — 99214 OFFICE O/P EST MOD 30 MIN: CPT

## 2025-01-24 RX ORDER — DEXAMETHASONE 1 MG
1 TABLET ORAL
Qty: 1 TABLET | Refills: 0 | Status: SHIPPED | OUTPATIENT
Start: 2025-01-24

## 2025-01-24 NOTE — PROGRESS NOTES
Name: Ju Stacy      : 1985      MRN: [id number]  Encounter Provider: ISHMAEL Childress  Encounter Date: 2025   Encounter department: [facility]  :  Assessment & Plan  Hypothyroidism due to Hashimoto thyroiditis  Presents clinically and biochemically euthyroid.  Complains of fatigue which was unchanged since starting/stopping levothyroxine.  She has been off levothyroxine since .  Okay with discontinuation of levothyroxine at this time.    Recommend repeat thyroid function tests in 6 months, and then on yearly basis.  May repeat sooner if symptomatic.  Continue to defer levothyroxine until TSH is greater than 10    Component      Latest Ref Rng 2025   TSH 3RD GENERATON      0.450 - 4.500 uIU/mL 4.416    FREE T4      0.61 - 1.12 ng/dL 0.75      Orders:  •  TSH, 3rd generation; Future  •  T4, free; Future    Class 2 severe obesity due to excess calories with serious comorbidity and body mass index (BMI) of 39.0 to 39.9 in adult (HCC)  BMI 41.27    Clinical suspicion for Cushing syndrome is low.  She has no stigmata of Cushing syndrome however will pursue dexamethasone suppression test due to complaint of increasing weight gain.      Continue to decrease caloric intake, follow balanced diet, avoid processed foods and sweetened beverages, and stay well-hydrated.  Orders:  •  dexamethasone (DECADRON) 1 mg tablet; Take 1 tablet (1 mg total) by mouth daily at bedtime Then have labs drawn fasting the following morning between  7-9 am.  •  Cortisol Level, AM Specimen        History of Present Illness   HPI  Ju Stacy is a 39 y.o. female who presents to the office today for follow-up of subclinical hypothyroidism due to Hashimoto's thyroiditis..  She was last seen in the office 2024 by Dr. Serrano at which time there was no changes made to her levothyroxine regimen.  She reports she stopped levothyroxine around  11/2024 as she felt no improvement in symptoms.  She denied feeling better/worse after coming off of levothyroxine.  Today she complains fatigue and continued unexplained weight gain    No history of external radiation to head/neck/chest.  No recent iodine loading in form of medication, biotin or kelp supplements, or radiological diagnostic studies.      Family history of thyroid disease includes: Mom Hashimoto's  Family history of thyroid cancer includes: No    Current Medication Regimen:   Levothyroxine 50 mcg daily - has not taken since November      History obtained from: patient    Review of Systems   Constitutional:  Positive for fatigue and unexpected weight change. Negative for chills and fever.   HENT:  Negative for congestion, sore throat, trouble swallowing and voice change.    Eyes:  Negative for visual disturbance.   Respiratory:  Negative for cough and shortness of breath.    Cardiovascular:  Negative for palpitations.   Gastrointestinal:  Negative for abdominal pain.   Endocrine: Negative for cold intolerance and heat intolerance.   Musculoskeletal:  Negative for gait problem.   Skin:  Negative for wound.   Psychiatric/Behavioral:  Positive for sleep disturbance. The patient is not nervous/anxious.      Current Outpatient Medications on File Prior to Visit   Medication Sig Dispense Refill   • Risankizumab-rzaa (SKYRIZI IV) Inject into a catheter in a vein     • [DISCONTINUED] ibuprofen (MOTRIN) 800 mg tablet Take 1 tablet (800 mg total) by mouth 3 (three) times a day (Patient taking differently: Take 800 mg by mouth 3 (three) times a day PRN) 21 tablet 0   • [DISCONTINUED] dicyclomine (BENTYL) 20 mg tablet Take 1 tablet (20 mg total) by mouth every 8 (eight) hours as needed (abdominal cramping or diarrhea) (Patient not taking: Reported on 10/25/2024) 30 tablet 0   • [DISCONTINUED] levothyroxine 50 mcg tablet Take 1 tablet (50 mcg total) by mouth daily (Patient not taking: Reported on 1/24/2025) 30  "tablet 5   • [DISCONTINUED] lidocaine (Lidoderm) 5 % Apply 1 patch topically over 12 hours daily Remove & Discard patch within 12 hours or as directed by MD (Patient not taking: Reported on 1/24/2025) 10 patch 0   • [DISCONTINUED] methocarbamol (ROBAXIN) 750 mg tablet Take 1 tablet (750 mg total) by mouth 2 (two) times a day as needed for muscle spasms (Patient not taking: Reported on 1/24/2025) 60 tablet 0   • [DISCONTINUED] ondansetron (ZOFRAN-ODT) 4 mg disintegrating tablet Take 1 tablet (4 mg total) by mouth every 8 (eight) hours as needed for nausea or vomiting (Patient not taking: Reported on 10/25/2024) 30 tablet 0   • [DISCONTINUED] pantoprazole (PROTONIX) 40 mg tablet Take 1 tablet (40 mg total) by mouth daily (Patient not taking: Reported on 1/24/2025) 30 tablet 1     No current facility-administered medications on file prior to visit.      Social History     Tobacco Use   • Smoking status: Never   • Smokeless tobacco: Never   Vaping Use   • Vaping status: Never Used   Substance and Sexual Activity   • Alcohol use: Not Currently     Alcohol/week: 1.0 standard drink of alcohol     Types: 1 Standard drinks or equivalent per week   • Drug use: Never   • Sexual activity: Yes     Partners: Male     Birth control/protection: None     Comment: Ann         Objective   /82 (BP Location: Right arm, Patient Position: Sitting, Cuff Size: Standard)   Pulse (!) 111   Temp 98.4 °F (36.9 °C)   Ht 5' 3\" (1.6 m)   Wt 106 kg (233 lb)   SpO2 96%   BMI 41.27 kg/m²      Physical Exam  Vitals reviewed.   Constitutional:       General: She is not in acute distress.     Appearance: Normal appearance. She is obese.   HENT:      Head: Normocephalic and atraumatic.      Mouth/Throat:      Mouth: Mucous membranes are moist.   Eyes:      Conjunctiva/sclera: Conjunctivae normal.   Neck:      Thyroid: No thyroid tenderness.   Cardiovascular:      Rate and Rhythm: Normal rate.   Pulmonary:      Effort: Pulmonary effort is " normal. No respiratory distress.   Abdominal:      Palpations: Abdomen is soft.      Tenderness: There is no abdominal tenderness.   Musculoskeletal:         General: No swelling.      Cervical back: Normal range of motion.   Skin:     General: Skin is warm and dry.      Capillary Refill: Capillary refill takes less than 2 seconds.   Neurological:      General: No focal deficit present.      Mental Status: She is alert and oriented to person, place, and time.   Psychiatric:         Mood and Affect: Mood normal.         Behavior: Behavior normal. Behavior is cooperative.         Thought Content: Thought content normal.         Judgment: Judgment normal.         Administrative Statements   I have spent a total time of 33 minutes in caring for this patient on the day of the visit/encounter including Diagnostic results, Prognosis, Risks and benefits of tx options, Instructions for management, Patient and family education, Importance of tx compliance, Risk factor reductions, Impressions, Counseling / Coordination of care, Documenting in the medical record, Reviewing / ordering tests, medicine, procedures  , and Obtaining or reviewing history  .

## 2025-01-24 NOTE — ASSESSMENT & PLAN NOTE
Presents clinically and biochemically euthyroid.  Complains of fatigue which was unchanged since starting/stopping levothyroxine.  She has been off levothyroxine since 11/24.  Okay with discontinuation of levothyroxine at this time.    Recommend repeat thyroid function tests in 6 months, and then on yearly basis.  May repeat sooner if symptomatic.  Continue to defer levothyroxine until TSH is greater than 10    Component      Latest Ref Rng 1/22/2025   TSH 3RD GENERATON      0.450 - 4.500 uIU/mL 4.416    FREE T4      0.61 - 1.12 ng/dL 0.75      Orders:  •  TSH, 3rd generation; Future  •  T4, free; Future

## 2025-01-24 NOTE — PATIENT INSTRUCTIONS
Dexamethasone suppression test at your convenience  TSH/ Free T4 in 6 months  Call if you wanna restart Levo

## 2025-01-24 NOTE — ASSESSMENT & PLAN NOTE
BMI 41.27    Clinical suspicion for Cushing syndrome is low.  She has no stigmata of Cushing syndrome however will pursue dexamethasone suppression test due to complaint of increasing weight gain.      Continue to decrease caloric intake, follow balanced diet, avoid processed foods and sweetened beverages, and stay well-hydrated.  Orders:  •  dexamethasone (DECADRON) 1 mg tablet; Take 1 tablet (1 mg total) by mouth daily at bedtime Then have labs drawn fasting the following morning between  7-9 am.  •  Cortisol Level, AM Specimen

## 2025-01-28 ENCOUNTER — HOSPITAL ENCOUNTER (OUTPATIENT)
Dept: INFUSION CENTER | Facility: CLINIC | Age: 40
Discharge: HOME | End: 2025-01-28
Payer: COMMERCIAL

## 2025-01-28 VITALS
TEMPERATURE: 97.3 F | RESPIRATION RATE: 18 BRPM | SYSTOLIC BLOOD PRESSURE: 184 MMHG | DIASTOLIC BLOOD PRESSURE: 93 MMHG | HEART RATE: 97 BPM

## 2025-01-28 DIAGNOSIS — K50.80 CROHN'S DISEASE OF BOTH SMALL AND LARGE INTESTINE WITHOUT COMPLICATION (HCC): Primary | ICD-10-CM

## 2025-01-28 PROCEDURE — 96365 THER/PROPH/DIAG IV INF INIT: CPT

## 2025-01-28 PROCEDURE — 96375 TX/PRO/DX INJ NEW DRUG ADDON: CPT

## 2025-01-28 RX ORDER — DEXTROSE MONOHYDRATE 50 MG/ML
20 INJECTION, SOLUTION INTRAVENOUS ONCE
OUTPATIENT
Start: 2025-02-25

## 2025-01-28 RX ORDER — METHYLPREDNISOLONE SODIUM SUCCINATE 40 MG/ML
40 INJECTION, POWDER, LYOPHILIZED, FOR SOLUTION INTRAMUSCULAR; INTRAVENOUS ONCE
Status: COMPLETED | OUTPATIENT
Start: 2025-01-28 | End: 2025-01-28

## 2025-01-28 RX ORDER — ACETAMINOPHEN 325 MG/1
650 TABLET ORAL ONCE
OUTPATIENT
Start: 2025-02-25

## 2025-01-28 RX ORDER — DEXTROSE MONOHYDRATE 50 MG/ML
20 INJECTION, SOLUTION INTRAVENOUS ONCE
Status: COMPLETED | OUTPATIENT
Start: 2025-01-28 | End: 2025-01-28

## 2025-01-28 RX ORDER — DIPHENHYDRAMINE HCL 25 MG
25 TABLET ORAL ONCE
Status: COMPLETED | OUTPATIENT
Start: 2025-01-28 | End: 2025-01-28

## 2025-01-28 RX ORDER — METHYLPREDNISOLONE SODIUM SUCCINATE 40 MG/ML
40 INJECTION, POWDER, LYOPHILIZED, FOR SOLUTION INTRAMUSCULAR; INTRAVENOUS ONCE
OUTPATIENT
Start: 2025-02-25

## 2025-01-28 RX ORDER — DIPHENHYDRAMINE HCL 25 MG
25 TABLET ORAL ONCE
OUTPATIENT
Start: 2025-02-25

## 2025-01-28 RX ORDER — ACETAMINOPHEN 325 MG/1
650 TABLET ORAL ONCE
Status: COMPLETED | OUTPATIENT
Start: 2025-01-28 | End: 2025-01-28

## 2025-01-28 RX ADMIN — DEXTROSE 600 MG: 5 SOLUTION INTRAVENOUS at 15:52

## 2025-01-28 RX ADMIN — METHYLPREDNISOLONE SODIUM SUCCINATE 40 MG: 40 INJECTION, POWDER, FOR SOLUTION INTRAMUSCULAR; INTRAVENOUS at 15:12

## 2025-01-28 RX ADMIN — DEXTROSE 20 ML/HR: 5 SOLUTION INTRAVENOUS at 15:12

## 2025-01-28 RX ADMIN — DIPHENHYDRAMINE HYDROCHLORIDE 25 MG: 25 TABLET ORAL at 15:12

## 2025-01-28 RX ADMIN — ACETAMINOPHEN 650 MG: 325 TABLET, FILM COATED ORAL at 15:12

## 2025-01-28 NOTE — PROGRESS NOTES
Pt into clinic for annabel. Pt offers no complaints.    Tolerating infusion without reaction.     Pt aware of next appointment on 2/26/25m at 3pm. AVS printed and received.      Report given to Pamela ROUSSEAU RN.

## 2025-01-28 NOTE — PROGRESS NOTES
Patient tolerated remainder of Skyrizi infusion. PIV removed.     Next appointment: 2/26/25 @ 1500

## 2025-02-03 ENCOUNTER — TELEPHONE (OUTPATIENT)
Age: 40
End: 2025-02-03

## 2025-02-03 DIAGNOSIS — K50.80 CROHN'S DISEASE OF BOTH SMALL AND LARGE INTESTINE WITHOUT COMPLICATION (HCC): Primary | ICD-10-CM

## 2025-02-03 NOTE — TELEPHONE ENCOUNTER
Patients GI provider:  ELAINE Dye    Number to return call: [phone]    Reason for call: Pt calling requesting the prescription of Skyrizi for home as she will get her last infusion in feb now. Please contact for any further question     Scheduled procedure/appointment date if applicable: Apt 2/25/25

## 2025-02-06 ENCOUNTER — RESULTS FOLLOW-UP (OUTPATIENT)
Dept: PAIN MEDICINE | Facility: CLINIC | Age: 40
End: 2025-02-06

## 2025-02-10 ENCOUNTER — RESULTS FOLLOW-UP (OUTPATIENT)
Dept: GASTROENTEROLOGY | Facility: HOSPITAL | Age: 40
End: 2025-02-10

## 2025-02-18 DIAGNOSIS — K50.80 CROHN'S DISEASE OF BOTH SMALL AND LARGE INTESTINE WITHOUT COMPLICATION (HCC): Primary | ICD-10-CM

## 2025-02-24 ENCOUNTER — HOSPITAL ENCOUNTER (OUTPATIENT)
Dept: INFUSION CENTER | Facility: CLINIC | Age: 40
Discharge: HOME | End: 2025-02-24
Payer: COMMERCIAL

## 2025-02-24 VITALS
RESPIRATION RATE: 18 BRPM | SYSTOLIC BLOOD PRESSURE: 137 MMHG | HEART RATE: 75 BPM | TEMPERATURE: 97.5 F | DIASTOLIC BLOOD PRESSURE: 77 MMHG

## 2025-02-24 DIAGNOSIS — K50.80 CROHN'S DISEASE OF BOTH SMALL AND LARGE INTESTINE WITHOUT COMPLICATION (HCC): Primary | ICD-10-CM

## 2025-02-24 PROCEDURE — 96365 THER/PROPH/DIAG IV INF INIT: CPT

## 2025-02-24 PROCEDURE — 96375 TX/PRO/DX INJ NEW DRUG ADDON: CPT

## 2025-02-24 RX ORDER — DEXTROSE MONOHYDRATE 50 MG/ML
20 INJECTION, SOLUTION INTRAVENOUS ONCE
Status: CANCELLED | OUTPATIENT
Start: 2025-02-25

## 2025-02-24 RX ORDER — METHYLPREDNISOLONE SODIUM SUCCINATE 40 MG/ML
40 INJECTION, POWDER, LYOPHILIZED, FOR SOLUTION INTRAMUSCULAR; INTRAVENOUS ONCE
Status: COMPLETED | OUTPATIENT
Start: 2025-02-24 | End: 2025-02-24

## 2025-02-24 RX ORDER — DIPHENHYDRAMINE HCL 25 MG
25 TABLET ORAL ONCE
Status: CANCELLED | OUTPATIENT
Start: 2025-02-25

## 2025-02-24 RX ORDER — DIPHENHYDRAMINE HCL 25 MG
25 TABLET ORAL ONCE
Status: COMPLETED | OUTPATIENT
Start: 2025-02-24 | End: 2025-02-24

## 2025-02-24 RX ORDER — METHYLPREDNISOLONE SODIUM SUCCINATE 40 MG/ML
40 INJECTION, POWDER, LYOPHILIZED, FOR SOLUTION INTRAMUSCULAR; INTRAVENOUS ONCE
Status: CANCELLED | OUTPATIENT
Start: 2025-02-25

## 2025-02-24 RX ORDER — DEXTROSE MONOHYDRATE 50 MG/ML
20 INJECTION, SOLUTION INTRAVENOUS ONCE
Status: COMPLETED | OUTPATIENT
Start: 2025-02-24 | End: 2025-02-24

## 2025-02-24 RX ORDER — ACETAMINOPHEN 325 MG/1
650 TABLET ORAL ONCE
Status: CANCELLED | OUTPATIENT
Start: 2025-02-25

## 2025-02-24 RX ORDER — ACETAMINOPHEN 325 MG/1
650 TABLET ORAL ONCE
Status: COMPLETED | OUTPATIENT
Start: 2025-02-24 | End: 2025-02-24

## 2025-02-24 RX ADMIN — DIPHENHYDRAMINE HYDROCHLORIDE 25 MG: 25 TABLET ORAL at 15:01

## 2025-02-24 RX ADMIN — METHYLPREDNISOLONE SODIUM SUCCINATE 40 MG: 40 INJECTION, POWDER, FOR SOLUTION INTRAMUSCULAR; INTRAVENOUS at 15:04

## 2025-02-24 RX ADMIN — ACETAMINOPHEN 650 MG: 325 TABLET, FILM COATED ORAL at 15:01

## 2025-02-24 RX ADMIN — DEXTROSE 600 MG: 5 SOLUTION INTRAVENOUS at 15:39

## 2025-02-24 RX ADMIN — DEXTROSE 20 ML/HR: 5 SOLUTION INTRAVENOUS at 15:34

## 2025-02-24 NOTE — PROGRESS NOTES
Patient to clinic for #3 Isaias. Denies recent illness and infections.  Tolerated infusion without complications. PIV removed.  Aware of no future infusions ordered at this time. AVS declined.

## 2025-02-25 ENCOUNTER — TELEPHONE (OUTPATIENT)
Dept: UROLOGY | Facility: CLINIC | Age: 40
End: 2025-02-25

## 2025-02-25 ENCOUNTER — TELEPHONE (OUTPATIENT)
Dept: GASTROENTEROLOGY | Facility: CLINIC | Age: 40
End: 2025-02-25

## 2025-02-25 ENCOUNTER — OFFICE VISIT (OUTPATIENT)
Dept: GASTROENTEROLOGY | Facility: CLINIC | Age: 40
End: 2025-02-25
Payer: COMMERCIAL

## 2025-02-25 VITALS
TEMPERATURE: 97.9 F | OXYGEN SATURATION: 97 % | HEIGHT: 63 IN | HEART RATE: 91 BPM | BODY MASS INDEX: 51.91 KG/M2 | WEIGHT: 293 LBS | SYSTOLIC BLOOD PRESSURE: 131 MMHG | DIASTOLIC BLOOD PRESSURE: 78 MMHG

## 2025-02-25 DIAGNOSIS — K50.80 CROHN'S DISEASE OF BOTH SMALL AND LARGE INTESTINE WITHOUT COMPLICATION (HCC): Primary | ICD-10-CM

## 2025-02-25 DIAGNOSIS — K58.0 IRRITABLE BOWEL SYNDROME WITH DIARRHEA: ICD-10-CM

## 2025-02-25 PROCEDURE — 99214 OFFICE O/P EST MOD 30 MIN: CPT | Performed by: INTERNAL MEDICINE

## 2025-02-25 NOTE — ASSESSMENT & PLAN NOTE
She is a 39-year-old female with small bowel Crohn's who was diagnosed in May 2024 with terminal ileitis and in that same month had a 14 cm section of inflammation on CT enterography.  She had a primary nonresponse to Humira in every 2 weeks and weekly dosing.  She just finished her third infusion of Skyrizi and has not about response rates for this agent.  I suspect it is too early for response.  She does not want to have steroids.  We talked about response rates for Skyrizi    1 she will continue on the Skyrizi injection every 8 weeks.  She will need to have CT enterography imaging as a marker of response, possible fecal product protectant testing based on her prior elevation    2 we will give her Xifaxan course for treatment of IBS-D concurrently with IBD    3 I told her that she could be treated with a weight loss agent.

## 2025-02-25 NOTE — TELEPHONE ENCOUNTER
----- Message from Ean Nevarez MD sent at 2/25/2025  3:07 PM EST -----  Pls get her xifaxan approved

## 2025-02-25 NOTE — PROGRESS NOTES
Name: Ju Stacy      : 1985      MRN: [id number]  Encounter Provider: Ean Nevarez III, MD  Encounter Date: 2025   Encounter department: [facility] [place]  :  Assessment & Plan  Crohn's disease of both small and large intestine without complication (HCC)  She is a 39-year-old female with small bowel Crohn's who was diagnosed in May 2024 with terminal ileitis and in that same month had a 14 cm section of inflammation on CT enterography.  She had a primary nonresponse to Humira in every 2 weeks and weekly dosing.  She just finished her third infusion of Skyrizi and has not about response rates for this agent.  I suspect it is too early for response.  She does not want to have steroids.  We talked about response rates for Skyrizi    1 she will continue on the Skyrizi injection every 8 weeks.  She will need to have CT enterography imaging as a marker of response, possible fecal product protectant testing based on her prior elevation    2 we will give her Xifaxan course for treatment of IBS-D concurrently with IBD    3 I told her that she could be treated with a weight loss agent.         Irritable bowel syndrome with diarrhea    Orders:    rifaximin (XIFAXAN) 550 mg tablet; Take 1 tablet (550 mg total) by mouth every 8 (eight) hours for 14 days        History of Present Illness   HPI  uJ Stacy is a 39 y.o. female who presents for evaluation of small bowel Crohn's.  She had a colonoscopy with me on May 2, 2024 that showed terminal ileitis.  She had a CT enterography in May 2024 that showed a 14 cm ileal Crohn's with no fistula.  She was put on Humira.  She had a low drug level in September and was not responding she went to weekly dosing.  She failed it.  In December she was switched to Skyrizi after seeing Jeni Dye.  She has had 3 infusions.  Her third 1 was yesterday.  She does not want to have steroids as a bridging therapy.  She is  "having bloating irregular stools she is having periodic vomiting that is rare but happening.  This is obviously concerning for obstructive symptoms.  She has no black stool or blood in the stool.  She is really concerned about weight gain so she does not want to have steroids or other things that can lead to weight gain.    History obtained from: patient    Review of Systems  Past Medical History   Past Medical History:   Diagnosis Date    Abdominal hernia     Crohn's colitis (HCC)     Disease of thyroid gland     Stress incontinence     Varicella     as child      Past Surgical History:   Procedure Laterality Date    COLPOSCOPY  2002    GYNECOLOGIC CRYOSURGERY  2002     Family History   Problem Relation Age of Onset    Asthma Mother     Hypertension Father     Hyperlipidemia Father     Colon cancer Maternal Uncle     No Known Problems Sister     No Known Problems Brother     No Known Problems Son     Heart disease Maternal Grandmother     Alzheimer's disease Maternal Grandfather     No Known Problems Sister       reports that she has never smoked. She has never used smokeless tobacco. She reports that she does not currently use alcohol after a past usage of about 1.0 standard drink of alcohol per week. She reports that she does not use drugs.  Current Outpatient Medications   Medication Instructions    dexamethasone (DECADRON) 1 mg, Oral, Daily at bedtime, Then have labs drawn fasting the following morning between  7-9 am.    rifaximin (XIFAXAN) 550 mg, Oral, Every 8 hours scheduled    Risankizumab-rzaa (SKYRIZI IV) Inject into a catheter in a vein    risankizumab-rzaa (SKYRIZI) 360 mg, Subcutaneous, Every 56 days     Allergies   Allergen Reactions    Budesonide Rash         Objective   /78 (BP Location: Right arm, Patient Position: Sitting, Cuff Size: Standard)   Pulse 91   Temp 97.9 °F (36.6 °C) (Tympanic)   Ht 5' 3\" (1.6 m)   Wt (!) 147 kg (323 lb)   SpO2 97%   BMI 57.22 kg/m²      Physical " Exam  Heart S1-S2 lungs are clear to auscultation bilaterally

## 2025-02-27 ENCOUNTER — TELEPHONE (OUTPATIENT)
Dept: GASTROENTEROLOGY | Facility: CLINIC | Age: 40
End: 2025-02-27

## 2025-02-27 NOTE — TELEPHONE ENCOUNTER
PA for XIFAXAN SUBMITTED to [facility]    via    []CMM-KEY:   []Surescripts-Case ID #   [x]Availity-Auth ID # [id number] NDC # [id number]  []Faxed to plan   []Other website   []Phone call Case ID #     [x]PA sent as URGENT    All office notes, labs and other pertaining documents and studies sent. Clinical questions answered. Awaiting determination from insurance company.     Turnaround time for your insurance to make a decision on your Prior Authorization can take 7-21 business days.

## 2025-02-27 NOTE — TELEPHONE ENCOUNTER
[facility] calling to make sure we were aware that Xifaxan requires a Pa, advised a message was sent to PA team on 2/25

## 2025-02-28 NOTE — TELEPHONE ENCOUNTER
PA for XIFAXAN  APPROVED     Date(s) approved UNTIL 03/17/2025    PT HAS NOT BEEN NOTIFIED    Office will notify pt of approval upon initiation of financial assistance,       Pharmacy advised by    [x]Fax  []Phone call         Approval letter scanned into [employer] Yes

## 2025-02-28 NOTE — TELEPHONE ENCOUNTER
Called patient and got DANNI YOUNG that her insurance company approved the XIFAXAN. Left office # as well

## 2025-02-28 NOTE — TELEPHONE ENCOUNTER
Duplicate encounter created, please see telephone encounter from 02/27/2025 regarding XIFAXAN PA status. Please review patient's chart to see if there is already an encounter regarding the medication in question and to document anything regarding this medication in regards to anything regarding the authorization process etc before creating another encounter Thank You.

## 2025-03-27 ENCOUNTER — OFFICE VISIT (OUTPATIENT)
Dept: FAMILY MEDICINE CLINIC | Facility: CLINIC | Age: 40
End: 2025-03-27
Payer: COMMERCIAL

## 2025-03-27 VITALS
BODY MASS INDEX: 41 KG/M2 | HEART RATE: 80 BPM | SYSTOLIC BLOOD PRESSURE: 140 MMHG | OXYGEN SATURATION: 98 % | HEIGHT: 63 IN | TEMPERATURE: 97.4 F | DIASTOLIC BLOOD PRESSURE: 86 MMHG | WEIGHT: 231.4 LBS

## 2025-03-27 DIAGNOSIS — L91.8 SKIN TAG: ICD-10-CM

## 2025-03-27 DIAGNOSIS — E66.01 MORBID OBESITY WITH BMI OF 40.0-44.9, ADULT (HCC): Primary | ICD-10-CM

## 2025-03-27 DIAGNOSIS — R21 RASH AND NONSPECIFIC SKIN ERUPTION: ICD-10-CM

## 2025-03-27 DIAGNOSIS — Z00.00 ANNUAL PHYSICAL EXAM: ICD-10-CM

## 2025-03-27 DIAGNOSIS — Z13.220 LIPID SCREENING: ICD-10-CM

## 2025-03-27 DIAGNOSIS — Z12.31 ENCOUNTER FOR SCREENING MAMMOGRAM FOR BREAST CANCER: ICD-10-CM

## 2025-03-27 PROCEDURE — 99396 PREV VISIT EST AGE 40-64: CPT

## 2025-03-27 PROCEDURE — 99213 OFFICE O/P EST LOW 20 MIN: CPT

## 2025-03-27 RX ORDER — CLOTRIMAZOLE AND BETAMETHASONE DIPROPIONATE 10; .64 MG/G; MG/G
CREAM TOPICAL 2 TIMES DAILY
Qty: 15 G | Refills: 0 | Status: SHIPPED | OUTPATIENT
Start: 2025-03-27

## 2025-03-27 NOTE — PATIENT INSTRUCTIONS
"Patient Education     Routine physical for adults   The Basics   Written by the doctors and editors at [facility]   What is a physical? -- A physical is a routine visit, or \"check-up,\" with your doctor. You might also hear it called a \"wellness visit\" or \"preventive visit.\"  During each visit, the doctor will:   Ask about your physical and mental health   Ask about your habits, behaviors, and lifestyle   Do an exam   Give you vaccines if needed   Talk to you about any medicines you take   Give advice about your health   Answer your questions  Getting regular check-ups is an important part of taking care of your health. It can help your doctor find and treat any problems you have. But it's also important for preventing health problems.  A routine physical is different from a \"sick visit.\" A sick visit is when you see a doctor because of a health concern or problem. Since physicals are scheduled ahead of time, you can think about what you want to ask the doctor.  How often should I get a physical? -- It depends on your age and health. In general, for people age 21 years and older:   If you are younger than 50 years, you might be able to get a physical every 3 years.   If you are 50 years or older, your doctor might recommend a physical every year.  If you have an ongoing health condition, like diabetes or high blood pressure, your doctor will probably want to see you more often.  What happens during a physical? -- In general, each visit will include:   Physical exam - The doctor or nurse will check your height, weight, heart rate, and blood pressure. They will also look at your eyes and ears. They will ask about how you are feeling and whether you have any symptoms that bother you.   Medicines - It's a good idea to bring a list of all the medicines you take to each doctor visit. Your doctor will talk to you about your medicines and answer any questions. Tell them if you are having any side effects that bother you. You " "should also tell them if you are having trouble paying for any of your medicines.   Habits and behaviors - This includes:   Your diet   Your exercise habits   Whether you smoke, drink alcohol, or use drugs   Whether you are sexually active   Whether you feel safe at home  Your doctor will talk to you about things you can do to improve your health and lower your risk of health problems. They will also offer help and support. For example, if you want to quit smoking, they can give you advice and might prescribe medicines. If you want to improve your diet or get more physical activity, they can help you with this, too.   Lab tests, if needed - The tests you get will depend on your age and situation. For example, your doctor might want to check your:   Cholesterol   Blood sugar   Iron level   Vaccines - The recommended vaccines will depend on your age, health, and what vaccines you already had. Vaccines are very important because they can prevent certain serious or deadly infections.   Discussion of screening - \"Screening\" means checking for diseases or other health problems before they cause symptoms. Your doctor can recommend screening based on your age, risk, and preferences. This might include tests to check for:   Cancer, such as breast, prostate, cervical, ovarian, colorectal, prostate, lung, or skin cancer   Sexually transmitted infections, such as chlamydia and gonorrhea   Mental health conditions like depression and anxiety  Your doctor will talk to you about the different types of screening tests. They can help you decide which screenings to have. They can also explain what the results might mean.   Answering questions - The physical is a good time to ask the doctor or nurse questions about your health. If needed, they can refer you to other doctors or specialists, too.  Adults older than 65 years often need other care, too. As you get older, your doctor will talk to you about:   How to prevent falling at " home   Hearing or vision tests   Memory testing   How to take your medicines safely   Making sure that you have the help and support you need at home  All topics are updated as new evidence becomes available and our peer review process is complete.  This topic retrieved from [employer] on: May 02, 2024.  Topic 006891 Version 1.0  Release: 32.4.3 - C32.122  © 2024 UpToDate, Inc. and/or its affiliates. All rights reserved.  Consumer Information Use and Disclaimer   Disclaimer: This generalized information is a limited summary of diagnosis, treatment, and/or medication information. It is not meant to be comprehensive and should be used as a tool to help the user understand and/or assess potential diagnostic and treatment options. It does NOT include all information about conditions, treatments, medications, side effects, or risks that may apply to a specific patient. It is not intended to be medical advice or a substitute for the medical advice, diagnosis, or treatment of a health care provider based on the health care provider's examination and assessment of a patient's specific and unique circumstances. Patients must speak with a health care provider for complete information about their health, medical questions, and treatment options, including any risks or benefits regarding use of medications. This information does not endorse any treatments or medications as safe, effective, or approved for treating a specific patient. UpToDate, Inc. and its affiliates disclaim any warranty or liability relating to this information or the use thereof.The use of this information is governed by the Terms of Use, available at https://www.woltersWHMSOFTuwer.com/en/know/clinical-effectiveness-terms. 2024© UpToDate, Inc. and its affiliates and/or licensors. All rights reserved.  Copyright   © 2024 UpToDate, Inc. and/or its affiliates. All rights reserved.

## 2025-03-27 NOTE — PROGRESS NOTES
Adult Annual Physical  Name: Ju Stacy      : 1985      MRN: [id number]  Encounter Provider: Carla Chester PA-C  Encounter Date: 3/27/2025   Encounter department: [facility]    Assessment & Plan  Morbid obesity with BMI of 40.0-44.9, adult (HCC)  Patient has recently gained weight within the last year, and is having trouble losing it despite diet and exercise.   she is interested in a GLP medication. However, I do not feel it is a good option at this time with her new chron's diagnosis and continuous GI symptoms at baseline given the significant risk of GI side effects with GLP1 medications.   I did recommend she see weight management for further evaluation and discussion, as they are more knowledgeable on the medications and may be able to offer her more help. Therefore, I placed a referral to weight management.     Orders:    Ambulatory Referral to Weight Management; Future    Rash and nonspecific skin eruption  Small erythematous papules of inner upper right arm. Unclear etiology. Will trial lotrisone cream x 7-14 days. Advised to let me know if symptoms persist, if so would recommend seeing dermatology for further evaluation.     Orders:    clotrimazole-betamethasone (LOTRISONE) 1-0.05 % cream; Apply topically 2 (two) times a day    Skin tag  Referral to dermatology placed for skin tag removal    Orders:    Ambulatory Referral to Dermatology; Future    Annual physical exam  Physical exam unremarkable. BP WNL.   Ordered routine labs to be completed.   Ordered mammogram.   Up to date on pap.   Orders:    Comprehensive metabolic panel; Future    Lipid screening    Orders:    Lipid panel; Future    Encounter for screening mammogram for breast cancer    Orders:    Mammo screening bilateral w cad; Future    Preventive Screenings:  - Diabetes Screening: screening up-to-date  - Cholesterol Screening: risks/benefits discussed and orders placed   - Hepatitis C screening:  screening up-to-date and risks/benefits discussed   - HIV screening: screening up-to-date and risks/benefits discussed   - Cervical cancer screening: screening up-to-date and risks/benefits discussed   - Breast cancer screening: risks/benefits discussed and orders placed   - Colon cancer screening: screening up-to-date   - Lung cancer screening: screening not indicated     Counseling/Anticipatory Guidance:    - Diet: discussed recommendations for a healthy/well-balanced diet.   - Exercise: the importance of regular exercise/physical activity was discussed. Recommend exercise 3-5 times per week for at least 30 minutes.          History of Present Illness       Patient presents for routine physical and to discuss weight loss options.   She was recently diagnosed with chron's within the last year. She has been following with GI. Reports her symptoms have not been well controlled, she is currently on Skyrizi but feels it is not helping her symptoms. She feels with all the steroids she has taken she has gained a lot of weight and is unable to lose it despite eating healthy and exercising.     Patient also reports rash on her inner upper right arm and elbow, reports intermittent and itchy for last few months. No pain.     She also has some skin tags she'd like removed, would like referral to dermatology.     Adult Annual Physical:  Patient presents for annual physical.     Diet and Physical Activity:  - Diet/Nutrition: no special diet.  - Exercise: no formal exercise.    Depression Screening:  - PHQ-2 Score: 0    General Health:  - Sleep: sleeps well and 4-6 hours of sleep on average.  - Hearing: normal hearing bilateral ears.  - Vision: no vision problems and most recent eye exam > 1 year ago.  - Dental: regular dental visits, brushes teeth once daily and floss regularly.    /GYN Health:  - Follows with GYN: yes.     Review of Systems   Constitutional:  Negative for chills, diaphoresis and fever.   Respiratory:   Negative for chest tightness, shortness of breath and wheezing.    Cardiovascular:  Negative for chest pain and palpitations.   Gastrointestinal:  Positive for abdominal pain, nausea and vomiting.   Skin:  Positive for rash.   Neurological:  Negative for dizziness, light-headedness and headaches.     Medical History Reviewed by provider this encounter:     .  Past Medical History   Past Medical History:   Diagnosis Date    Abdominal hernia     Crohn's colitis (HCC)     Disease of thyroid gland     Stress incontinence     Varicella     as child      Past Surgical History:   Procedure Laterality Date    COLPOSCOPY  2002    GYNECOLOGIC CRYOSURGERY  2002     Family History   Problem Relation Age of Onset    Asthma Mother     Hypertension Father     Hyperlipidemia Father     Colon cancer Maternal Uncle     No Known Problems Sister     No Known Problems Brother     No Known Problems Son     Heart disease Maternal Grandmother     Alzheimer's disease Maternal Grandfather     No Known Problems Sister       reports that she has never smoked. She has never used smokeless tobacco. She reports current alcohol use of about 1.0 standard drink of alcohol per week. She reports that she does not use drugs.  Current Outpatient Medications   Medication Instructions    clotrimazole-betamethasone (LOTRISONE) 1-0.05 % cream Topical, 2 times daily    dexamethasone (DECADRON) 1 mg, Oral, Daily at bedtime, Then have labs drawn fasting the following morning between  7-9 am.    Risankizumab-rzaa (SKYRIZI IV) Inject into a catheter in a vein    risankizumab-rzaa (SKYRIZI) 360 mg, Subcutaneous, Every 56 days     Allergies   Allergen Reactions    Budesonide Rash      Current Outpatient Medications on File Prior to Visit   Medication Sig Dispense Refill    risankizumab-rzaa (SKYRIZI) 360 MG/2.4ML SOCT Inject 2.4 mL (360 mg total) under the skin every 56 days 2.4 mL 5    dexamethasone (DECADRON) 1 mg tablet Take 1 tablet (1 mg total) by mouth daily  "at bedtime Then have labs drawn fasting the following morning between  7-9 am. (Patient not taking: Reported on 2/25/2025) 1 tablet 0    Risankizumab-rzaa (SKYRIZI IV) Inject into a catheter in a vein (Patient not taking: Reported on 2/25/2025)       No current facility-administered medications on file prior to visit.      Social History     Tobacco Use    Smoking status: Never    Smokeless tobacco: Never   Vaping Use    Vaping status: Never Used   Substance and Sexual Activity    Alcohol use: Yes     Alcohol/week: 1.0 standard drink of alcohol     Types: 1 Standard drinks or equivalent per week    Drug use: Never    Sexual activity: Yes     Partners: Male     Birth control/protection: None     Comment: ABIGAILB-Nicolás        Objective   /86   Pulse 80   Temp (!) 97.4 °F (36.3 °C)   Ht 5' 3\" (1.6 m)   Wt 105 kg (231 lb 6.4 oz)   SpO2 98%   BMI 40.99 kg/m²     Physical Exam  Vitals reviewed.   Constitutional:       General: She is not in acute distress.     Appearance: Normal appearance. She is not ill-appearing or diaphoretic.   HENT:      Head: Normocephalic and atraumatic.      Right Ear: Tympanic membrane, ear canal and external ear normal. There is no impacted cerumen.      Left Ear: Tympanic membrane, ear canal and external ear normal. There is no impacted cerumen.      Nose: Nose normal. No congestion or rhinorrhea.      Mouth/Throat:      Mouth: Mucous membranes are moist.      Pharynx: Oropharynx is clear. No oropharyngeal exudate or posterior oropharyngeal erythema.   Eyes:      General:         Right eye: No discharge.         Left eye: No discharge.      Conjunctiva/sclera: Conjunctivae normal.   Cardiovascular:      Rate and Rhythm: Normal rate and regular rhythm.      Heart sounds: Normal heart sounds. No murmur heard.  Pulmonary:      Effort: Pulmonary effort is normal. No respiratory distress.      Breath sounds: Normal breath sounds. No wheezing, rhonchi or rales.   Abdominal:      General: " Bowel sounds are normal. There is no distension.      Palpations: Abdomen is soft.      Tenderness: There is no abdominal tenderness.   Musculoskeletal:      Cervical back: Normal range of motion and neck supple.      Right lower leg: No edema.      Left lower leg: No edema.   Lymphadenopathy:      Cervical: No cervical adenopathy.   Skin:     General: Skin is warm.      Findings: Rash present.          Neurological:      General: No focal deficit present.      Mental Status: She is alert.      Gait: Gait normal.   Psychiatric:         Mood and Affect: Mood normal.

## 2025-04-01 ENCOUNTER — TELEPHONE (OUTPATIENT)
Age: 40
End: 2025-04-01

## 2025-04-01 NOTE — TELEPHONE ENCOUNTER
Left voice mail message for patient to contact the office to schedule an appt in the  [place] office. Patient was recently referred.

## 2025-04-03 ENCOUNTER — TELEPHONE (OUTPATIENT)
Age: 40
End: 2025-04-03

## 2025-04-07 ENCOUNTER — OFFICE VISIT (OUTPATIENT)
Dept: FAMILY MEDICINE CLINIC | Facility: CLINIC | Age: 40
End: 2025-04-07
Payer: COMMERCIAL

## 2025-04-07 VITALS
DIASTOLIC BLOOD PRESSURE: 90 MMHG | WEIGHT: 230 LBS | HEART RATE: 86 BPM | HEIGHT: 63 IN | TEMPERATURE: 97.8 F | BODY MASS INDEX: 40.75 KG/M2 | OXYGEN SATURATION: 97 % | SYSTOLIC BLOOD PRESSURE: 132 MMHG

## 2025-04-07 DIAGNOSIS — L91.8 INFLAMED SKIN TAG: ICD-10-CM

## 2025-04-07 DIAGNOSIS — L91.8 SKIN TAG, ACQUIRED: Primary | ICD-10-CM

## 2025-04-07 PROBLEM — R68.89 HEAT INTOLERANCE: Status: RESOLVED | Noted: 2024-06-21 | Resolved: 2025-04-07

## 2025-04-07 PROBLEM — J02.9 PHARYNGITIS: Status: RESOLVED | Noted: 2022-02-03 | Resolved: 2025-04-07

## 2025-04-07 PROCEDURE — 11200 RMVL SKIN TAGS UP TO&INC 15: CPT | Performed by: FAMILY MEDICINE

## 2025-04-07 PROCEDURE — 88304 TISSUE EXAM BY PATHOLOGIST: CPT | Performed by: PATHOLOGY

## 2025-04-07 NOTE — PROGRESS NOTES
"Name: Ju Stacy      : 1985      MRN: [id number]  Encounter Provider: Andrez Davila MD  Encounter Date: 2025   Encounter department: [facility]  :  Assessment & Plan  Skin tag, acquired  See Procedure note    Follow up as needed  Orders:  •  Skin tag removal           History of Present Illness   Patient is due to skin tags both axilla irritated rubs against the skin. Has been present for the past several months.      Review of Systems   Constitutional:  Negative for activity change, appetite change, fatigue and fever.   HENT:  Negative for congestion and ear discharge.    Respiratory:  Negative for cough and shortness of breath.    Cardiovascular:  Negative for chest pain and palpitations.   Gastrointestinal:  Negative for diarrhea and nausea.   Musculoskeletal:  Negative for arthralgias and back pain.   Skin:  Positive for color change. Negative for rash.   Neurological:  Negative for dizziness and headaches.   Psychiatric/Behavioral:  Negative for agitation and behavioral problems.        Objective   /90 (BP Location: Left arm, Patient Position: Sitting, Cuff Size: Large)   Pulse 86   Temp 97.8 °F (36.6 °C)   Ht 5' 3\" (1.6 m)   Wt 104 kg (230 lb)   SpO2 97%   BMI 40.74 kg/m²      Physical Exam  Vitals and nursing note reviewed.   Constitutional:       General: She is not in acute distress.     Appearance: She is well-developed.   HENT:      Head: Normocephalic and atraumatic.   Eyes:      Conjunctiva/sclera: Conjunctivae normal.   Cardiovascular:      Rate and Rhythm: Normal rate and regular rhythm.      Heart sounds: No murmur heard.  Pulmonary:      Effort: Pulmonary effort is normal. No respiratory distress.      Breath sounds: Normal breath sounds.   Abdominal:      Palpations: Abdomen is soft.      Tenderness: There is no abdominal tenderness.   Musculoskeletal:         General: No swelling.      Cervical back: Neck supple.   Skin:     General: Skin is " warm and dry.      Capillary Refill: Capillary refill takes less than 2 seconds.      Comments: 2 skin tags both axilla   Neurological:      Mental Status: She is alert.   Psychiatric:         Mood and Affect: Mood normal.     Skin tag removal    Date/Time: 4/7/2025 8:20 AM    Performed by: Andrez Davila MD  Authorized by: Andrez Davila MD  Universal Protocol:  procedure performed by consultantConsent: Verbal consent obtained. Written consent obtained.  Risks and benefits: risks, benefits and alternatives were discussed  Consent given by: patient      Procedure Details - Skin Tag Destruction:     Up to 15      Body area:  Upper extremity and trunk    Trunk location:  L axilla    Malignancy: benign lesion    Lesion 2:     Body area:  Trunk    Trunk location:  L axilla    Malignancy: benign lesion    Lesion 6:      After discussing risks and benefits of procedure and obtaining written consent. 2 lesions identified on both axillas. Iodine used to clean area. 0.5 ml lidocaine used to anesthetize both axilla lesions. Removed with scissors minimal bleeding no complications.

## 2025-04-10 ENCOUNTER — RESULTS FOLLOW-UP (OUTPATIENT)
Dept: FAMILY MEDICINE CLINIC | Facility: CLINIC | Age: 40
End: 2025-04-10

## 2025-04-10 PROCEDURE — 88304 TISSUE EXAM BY PATHOLOGIST: CPT | Performed by: PATHOLOGY

## 2025-05-02 ENCOUNTER — OFFICE VISIT (OUTPATIENT)
Dept: BARIATRICS | Facility: CLINIC | Age: 40
End: 2025-05-02
Payer: COMMERCIAL

## 2025-05-02 VITALS
HEIGHT: 63 IN | SYSTOLIC BLOOD PRESSURE: 132 MMHG | RESPIRATION RATE: 16 BRPM | HEART RATE: 80 BPM | BODY MASS INDEX: 39.83 KG/M2 | WEIGHT: 224.8 LBS | DIASTOLIC BLOOD PRESSURE: 82 MMHG

## 2025-05-02 DIAGNOSIS — E66.01 MORBID OBESITY WITH BMI OF 40.0-44.9, ADULT (HCC): ICD-10-CM

## 2025-05-02 PROBLEM — E66.812 CLASS 2 SEVERE OBESITY DUE TO EXCESS CALORIES WITH SERIOUS COMORBIDITY AND BODY MASS INDEX (BMI) OF 39.0 TO 39.9 IN ADULT (HCC): Status: RESOLVED | Noted: 2024-06-21 | Resolved: 2025-05-02

## 2025-05-02 PROCEDURE — 99214 OFFICE O/P EST MOD 30 MIN: CPT

## 2025-05-02 RX ORDER — TIRZEPATIDE 2.5 MG/.5ML
2.5 INJECTION, SOLUTION SUBCUTANEOUS WEEKLY
Qty: 2 ML | Refills: 0 | Status: SHIPPED | OUTPATIENT
Start: 2025-05-02 | End: 2025-05-30

## 2025-05-02 NOTE — PROGRESS NOTES
Assessment/Plan:    Morbid obesity with BMI of 40.0-44.9, adult (HCC)  - Discussed options of HealthyCORE-Intensive Lifestyle Intervention Program, Very Low Calorie Diet-VLCD, and Conservative Program and the role of weight loss medications.  - Patient is interested in pursuing Conservative Program and follow up visits with medical weight management provider.  - Explained the importance of making lifestyle changes in addition to starting any anti-obesity medications.   - Initial weight loss goal of 5-10% weight loss for improved health. Weight loss can improve patient's co-morbid conditions and/or prevent weight-related complications.  - Weight is not at goal and patient has been unable to achieve a meaningful weight loss above 5% using various programs and tools for more than 6 months  - Labs reviewed from 1/2025    General Recommendations:  Nutrition:  Eat breakfast daily.  Do not skip meals.      Food log (ie.) www.Bio.com, sparkpeople.com, loseit.com, calorieking.com, etc.     Practice mindful eating.  Be sure to set aside time to eat, eat slowly, and savor your food.     Hydration:    At least 64oz of water daily.  No sugar sweetened beverages.  No juice (eat the fruit instead).     Exercise:  Studies have shown that the ideal exercise goal is somewhere between 150 to 300 minutes of moderate intensity exercise a week.  Start with exercising 10 minutes every other day and gradually increase physical activity with a goal of at least 150 minutes of moderate intensity exercise a week, divided over at least 3 days a week.  An example of this would be exercising 30 minutes a day, 5 days a week.  Resistance training can increase muscle mass and increase our resting metabolic rate.   FULL BODY resistance training is recommended 2-3 times a week.  Do not do this on consecutive days to allow for muscle recovery.     Aim for a bare minimum 5000 steps, even on days you do not exercise.     Monitoring:   Weigh  yourself daily.  If this causes undue stress, then just weigh yourself once a week.  Weigh yourself the same time of the day with the same amount of clothing on.  Preferably this should be done after waking up, before you eat, and with no clothing or minimal clothing on.     Specific Goals:  Calorie goal:  5830-9823 allen/day (Provided with meal plan to follow) Discussed her diet and exercise and she is encouraged to eat smaller more frequent meals a day and track  her intake. She is also encouraged to increase her protein. She is encouraged to hydrate well and increase her physical activity.  Discussed medications and she is interested in a GLP1, this could be beneficial in her weight loss and potentially improve or worsen her chrons symptoms. She is understanding of this and willing to try it and see if possibly improves her symptoms.   Patient denies personal history of pancreatitis. Patient also denies personal and family history of medullary thyroid cancer and multiple endocrine neoplasia type 2 (MEN 2 tumor). Patient denies any history of kidney stones, seizures, or glaucoma.   Zepbound Instructions:    - Begin Zepbound 2.5 mg subcutaneously once a week. Dose changes may occur after 4 doses if medication is tolerated. You will be assessed prior to each dose change to make sure you are tolerating the medication well.  - Please message me when you have 2 pens left from the prescription so there are no lapses in treatment.  - If you have been off the medication for more than 14 days please contact the office as you will need to restart the titration at the starting dose again to avoid significant side effects or adverse events.  - Visit Zepbound.com for further information/injection instructions.   -Please eat small frequent meals to help reduce nausea. Lemon water and saltine crackers may help with this.   - Side effects of Zepbound discussed: nausea, vomiting, diarrhea, and constipation. If you experience fever,  nausea/vomiting, and pain radiating to your back this may be a sign of pancreatitis. Please go to the emergency room if this occurs.  - If on oral birth control a 2nd method of birth control is recommended during the 1st 8 weeks of therapy and for 4 weeks after any dosage change.   - Patient understands the side effects of the medication and proper administration. Patient agrees with the treatment plan and all questions were answered. Medication consent was reviewed today and all questions were answered.  Patient agreed with all bulleted points.  Consent was signed, scanned into chart and patient was provided with a copy for their records.          Ju was seen today for consult.    Diagnoses and all orders for this visit:    Morbid obesity with BMI of 40.0-44.9, adult ([facility])  -     Ambulatory Referral to Weight Management  -     tirzepatide (Zepbound) 2.5 mg/0.5 mL auto-injector; Inject 0.5 mL (2.5 mg total) under the skin once a week for 28 days           Total time spent reviewing chart, interviewing patient, examining patient, discussing plan, answering all questions, and documentin min, with >50% face-to-face time spent counseling patient on nonsurgical interventions for the treatment of excess weight. Discussed in detail nonsurgical options including intensive lifestyle intervention program, very low-calorie diet program and conservative program.  Discussed the role of weight loss medications.  Counseled patient on diet behavior and exercise modification for weight loss.    Follow up in approximately 3 months with Non-Surgical Physician/Advanced Practitioner.    Subjective:   Chief Complaint   Patient presents with    Consult     Initial visit with luca       Patient ID: Ju Stacy  is a 40 y.o. female with excess weight/obesity here to pursue weight management.  Previous notes and records have been reviewed.    Past Medical History:   Diagnosis Date    Abdominal hernia     Crohn's  colitis (HCC)     Disease of thyroid gland     Stress incontinence     Varicella     as child      Past Surgical History:   Procedure Laterality Date    COLPOSCOPY  2002    GYNECOLOGIC CRYOSURGERY  2002       HPI:  Wt Readings from Last 20 Encounters:   05/02/25 102 kg (224 lb 12.8 oz)   04/07/25 104 kg (230 lb)   03/27/25 105 kg (231 lb 6.4 oz)   02/25/25 (!) 147 kg (323 lb)   01/24/25 106 kg (233 lb)   01/10/25 90.7 kg (200 lb)   01/09/25 90.7 kg (200 lb)   12/18/24 105 kg (232 lb)   10/25/24 102 kg (224 lb)   08/21/24 101 kg (222 lb 9.6 oz)   06/21/24 101 kg (222 lb 3.2 oz)   06/20/24 100 kg (221 lb)   05/02/24 95.4 kg (210 lb 5.1 oz)   04/25/24 97 kg (213 lb 12.8 oz)   12/20/23 98.9 kg (218 lb)   12/07/23 98.3 kg (216 lb 12.8 oz)   10/26/23 95.3 kg (210 lb)   09/19/23 98 kg (216 lb)   06/16/22 97.5 kg (215 lb)   05/23/22 98.3 kg (216 lb 12.8 oz)       Patient presents today to medical weight management office for consult. She has struggled with  her weight loss since having her kids and then getting diagnosed with chrons disease and having to be on steroids for treatment. She is in process of trying to find a medication to help treat her chrons but has not had one that has been effective yet. She is interested in a GLp 1 for weight loss but she is unsure how to proceed because one doctor tells her one thing and her GI tells her another.   She is not on BC but is not planning anymore children.     Starting MWM weight: 224 lbs   Starting BMI: 40.1   Starting Waist Measurement: 44 inches   Goal weight: under 200 lbs     Obesity/Excess Weight:  Severity: Severe  Onset:  Since having her kids and then getting diagnosed with Chrons    Modifiers: Diet and Exercise and Commercial Weight Loss Programs-ie. Weight Watchers, Trini Reji, Nutrisystem, etc.  Contributing factors: Poor Food Choices, Insufficient Physical Activity, Stress/Emotional Eating, Lack of knowledge of appropriate lifestyle changes, and Insufficient  time to make appropriate lifestyle changes  Associated symptoms: comorbid conditions, fatigue, increased joint pain, decreased exercise capacity, body image issues, decreased self esteem, decreased mobility, depression, inability to do certain activities, and clothes do not fit    Diet recall:  B: Skips   S: no  L: Snack on something grilled chicken or skips   S: no  D: Meat veggie and starch   S: no  Take out frequency: couple times a month     Hydration: Water, coffee 1 cup a day, hot tea, cranberry juice, ginger ale  Alcohol: weekends   Smoking: no   Exercise: 10,000 steps a day school, plays with kids and walks  Occupation:  at tech   Sleep: 7-8 hours a night   STOP ban/8        The following portions of the patient's history were reviewed and updated as appropriate: allergies, current medications, past family history, past medical history, past social history, past surgical history, and problem list.    Family History   Problem Relation Age of Onset    Asthma Mother     Hypertension Father     Hyperlipidemia Father     Thyroid disease Father     No Known Problems Sister     No Known Problems Sister     No Known Problems Brother     No Known Problems Son     Colon cancer Maternal Uncle     Heart disease Maternal Grandmother     Alzheimer's disease Maternal Grandfather         Review of Systems   Constitutional:  Negative for fatigue.   HENT:  Negative for sore throat.    Respiratory:  Negative for cough and shortness of breath.    Cardiovascular:  Negative for chest pain, palpitations and leg swelling.   Gastrointestinal:  Negative for abdominal pain, constipation, diarrhea, nausea and vomiting.   Genitourinary:  Negative for dysuria.   Musculoskeletal:  Negative for arthralgias and back pain.   Skin:  Negative for rash.   Neurological:  Negative for headaches.   Psychiatric/Behavioral:  Negative for dysphoric mood. The patient is not nervous/anxious.        Objective:  /82 (BP  "Location: Left arm, Patient Position: Sitting, Cuff Size: Large)   Pulse 80   Resp 16   Ht 5' 2.72\" (1.593 m)   Wt 102 kg (224 lb 12.8 oz)   BMI 40.18 kg/m²     Physical Exam         Labs and Imaging  Recent labs and imaging have been personally reviewed.  Lab Results   Component Value Date    WBC 8.73 08/30/2024    HGB 13.4 08/30/2024    HCT 42.3 08/30/2024    MCV 91 08/30/2024     08/30/2024     Lab Results   Component Value Date    SODIUM 135 08/30/2024    K 3.8 08/30/2024     08/30/2024    CO2 24 08/30/2024    AGAP 10 08/30/2024    BUN 12 08/30/2024    CREATININE 1.11 08/30/2024    GLUC 84 08/30/2024    GLUF 75 05/02/2024    CALCIUM 9.1 08/30/2024    AST 36 08/30/2024    ALT 71 (H) 08/30/2024    ALKPHOS 61 08/30/2024    TP 7.1 08/30/2024    TBILI 0.39 08/30/2024    EGFR 62 08/30/2024     No results found for: \"HGBA1C\"  Lab Results   Component Value Date    JHW1BPMAEVLZ 4.416 01/22/2025     Lab Results   Component Value Date    CHOLESTEROL 156 10/25/2023     Lab Results   Component Value Date    HDL 48 (L) 10/25/2023     Lab Results   Component Value Date    TRIG 155 (H) 10/25/2023     Lab Results   Component Value Date    LDLCALC 77 10/25/2023     "

## 2025-05-02 NOTE — ASSESSMENT & PLAN NOTE
- Discussed options of HealthyCORE-Intensive Lifestyle Intervention Program, Very Low Calorie Diet-VLCD, and Conservative Program and the role of weight loss medications.  - Patient is interested in pursuing Conservative Program and follow up visits with medical weight management provider.  - Explained the importance of making lifestyle changes in addition to starting any anti-obesity medications.   - Initial weight loss goal of 5-10% weight loss for improved health. Weight loss can improve patient's co-morbid conditions and/or prevent weight-related complications.  - Weight is not at goal and patient has been unable to achieve a meaningful weight loss above 5% using various programs and tools for more than 6 months  - Labs reviewed from 1/2025    General Recommendations:  Nutrition:  Eat breakfast daily.  Do not skip meals.      Food log (ie.) www.myfitnesspal.com, sparkpeople.com, loseit.com, calorieking.com, etc.     Practice mindful eating.  Be sure to set aside time to eat, eat slowly, and savor your food.     Hydration:    At least 64oz of water daily.  No sugar sweetened beverages.  No juice (eat the fruit instead).     Exercise:  Studies have shown that the ideal exercise goal is somewhere between 150 to 300 minutes of moderate intensity exercise a week.  Start with exercising 10 minutes every other day and gradually increase physical activity with a goal of at least 150 minutes of moderate intensity exercise a week, divided over at least 3 days a week.  An example of this would be exercising 30 minutes a day, 5 days a week.  Resistance training can increase muscle mass and increase our resting metabolic rate.   FULL BODY resistance training is recommended 2-3 times a week.  Do not do this on consecutive days to allow for muscle recovery.     Aim for a bare minimum 5000 steps, even on days you do not exercise.     Monitoring:   Weigh yourself daily.  If this causes undue stress, then just weigh yourself once  a week.  Weigh yourself the same time of the day with the same amount of clothing on.  Preferably this should be done after waking up, before you eat, and with no clothing or minimal clothing on.     Specific Goals:  Calorie goal:  2074-2954 allen/day (Provided with meal plan to follow) Discussed her diet and exercise and she is encouraged to eat smaller more frequent meals a day and track  her intake. She is also encouraged to increase her protein. She is encouraged to hydrate well and increase her physical activity.  Discussed medications and she is interested in a GLP1, this could be beneficial in her weight loss and potentially improve or worsen her chrons symptoms. She is understanding of this and willing to try it and see if possibly improves her symptoms.   Patient denies personal history of pancreatitis. Patient also denies personal and family history of medullary thyroid cancer and multiple endocrine neoplasia type 2 (MEN 2 tumor). Patient denies any history of kidney stones, seizures, or glaucoma.   Zepbound Instructions:    - Begin Zepbound 2.5 mg subcutaneously once a week. Dose changes may occur after 4 doses if medication is tolerated. You will be assessed prior to each dose change to make sure you are tolerating the medication well.  - Please message me when you have 2 pens left from the prescription so there are no lapses in treatment.  - If you have been off the medication for more than 14 days please contact the office as you will need to restart the titration at the starting dose again to avoid significant side effects or adverse events.  - Visit Zepbound.com for further information/injection instructions.   -Please eat small frequent meals to help reduce nausea. Lemon water and saltine crackers may help with this.   - Side effects of Zepbound discussed: nausea, vomiting, diarrhea, and constipation. If you experience fever, nausea/vomiting, and pain radiating to your back this may be a sign of  pancreatitis. Please go to the emergency room if this occurs.  - If on oral birth control a 2nd method of birth control is recommended during the 1st 8 weeks of therapy and for 4 weeks after any dosage change.   - Patient understands the side effects of the medication and proper administration. Patient agrees with the treatment plan and all questions were answered. Medication consent was reviewed today and all questions were answered.  Patient agreed with all bulleted points.  Consent was signed, scanned into chart and patient was provided with a copy for their records.

## 2025-05-05 ENCOUNTER — TELEPHONE (OUTPATIENT)
Dept: BARIATRICS | Facility: CLINIC | Age: 40
End: 2025-05-05

## 2025-05-06 DIAGNOSIS — E66.01 MORBID OBESITY WITH BMI OF 40.0-44.9, ADULT (HCC): ICD-10-CM

## 2025-05-06 NOTE — TELEPHONE ENCOUNTER
THIS IS NOT A DUPLICATE    Reason for call: CHANGE IN PHARMACY  [x] Refill   [] Prior Auth  [] Other:     Office:   [] PCP/Provider -   [x] Specialty/Provider:  ISHMAEL Sheilds    Weight Mgt [facility]                     Medication: tirzepatide (Zepbound)     Dose/Frequency: 2.5 mg/0.5 mL    2.5 mg, Subcutaneous, Weekly       Quantity: 2ml    Pharmacy: [id number] [place]    Local Pharmacy   Does the patient have enough for 3 days?   [] Yes   [x] No - Send as HP to POD    Mail Away Pharmacy   Does the patient have enough for 10 days?   [] Yes   [] No - Send as HP to POD

## 2025-05-07 RX ORDER — TIRZEPATIDE 2.5 MG/.5ML
2.5 INJECTION, SOLUTION SUBCUTANEOUS WEEKLY
Qty: 2 ML | Refills: 0 | Status: SHIPPED | OUTPATIENT
Start: 2025-05-07 | End: 2025-06-04

## 2025-05-07 NOTE — TELEPHONE ENCOUNTER
Zepbound Approved through 12/4/25.  Pharmacy was notified.  They have medication on order for the patient.  Patient co-pay $100.  Patient informed via [employer] [place].

## 2025-05-19 DIAGNOSIS — E66.01 MORBID OBESITY WITH BMI OF 40.0-44.9, ADULT (HCC): ICD-10-CM

## 2025-05-19 RX ORDER — TIRZEPATIDE 2.5 MG/.5ML
2.5 INJECTION, SOLUTION SUBCUTANEOUS WEEKLY
Qty: 2 ML | Refills: 0 | Status: SHIPPED | OUTPATIENT
Start: 2025-05-19 | End: 2025-06-16

## 2025-05-21 NOTE — TELEPHONE ENCOUNTER
Left second voice mail message for patient to contact the office to schedule an appt in the  [place] office. Patient was recently referred.  Referral encounter closed.  
No

## 2025-05-29 ENCOUNTER — OFFICE VISIT (OUTPATIENT)
Dept: GASTROENTEROLOGY | Facility: CLINIC | Age: 40
End: 2025-05-29
Payer: COMMERCIAL

## 2025-05-29 VITALS
RESPIRATION RATE: 18 BRPM | DIASTOLIC BLOOD PRESSURE: 84 MMHG | WEIGHT: 225 LBS | HEIGHT: 63 IN | SYSTOLIC BLOOD PRESSURE: 126 MMHG | HEART RATE: 88 BPM | BODY MASS INDEX: 39.87 KG/M2 | TEMPERATURE: 97.5 F | OXYGEN SATURATION: 97 %

## 2025-05-29 DIAGNOSIS — K50.80 CROHN'S DISEASE OF BOTH SMALL AND LARGE INTESTINE WITHOUT COMPLICATION (HCC): Primary | ICD-10-CM

## 2025-05-29 DIAGNOSIS — R10.30 LOWER ABDOMINAL PAIN: ICD-10-CM

## 2025-05-29 DIAGNOSIS — E66.01 MORBID OBESITY WITH BMI OF 40.0-44.9, ADULT (HCC): ICD-10-CM

## 2025-05-29 PROCEDURE — 99213 OFFICE O/P EST LOW 20 MIN: CPT | Performed by: PHYSICIAN ASSISTANT

## 2025-05-29 RX ORDER — HYOSCYAMINE SULFATE 0.12 MG/1
0.12 TABLET SUBLINGUAL EVERY 6 HOURS PRN
Qty: 45 TABLET | Refills: 3 | Status: SHIPPED | OUTPATIENT
Start: 2025-05-29

## 2025-05-29 RX ORDER — TIRZEPATIDE 2.5 MG/.5ML
2.5 INJECTION, SOLUTION SUBCUTANEOUS WEEKLY
Qty: 2 ML | Refills: 0 | Status: SHIPPED | OUTPATIENT
Start: 2025-05-29 | End: 2025-06-26

## 2025-05-29 NOTE — ASSESSMENT & PLAN NOTE
Patient having alternating diarrhea and constipation with bloating.  Her last enterography a year ago did not reveal a stricture, but I am concerned that there might be an underlying stricture that could be causing a change in bowel habits.  She was also previously diagnosed with irritable bowel syndrome, and this could also be a possibility.  She is agreeable to repeating a CT enterography to determine the status of her inflammation.   -CT enterography  -Update labs  -Due for TB and chronic hepatitis testing, ordered and will need to be continued annually  -Patient wants to defer steroids at this time since she does not feel her symptoms are severe, however pending results of her blood work and enterography this might be necessary

## 2025-05-29 NOTE — PROGRESS NOTES
Name: Ju Stacy      : 1985      MRN: [id number]  Encounter Provider: Jeni Handy PA-C  Encounter Date: 2025   Encounter department: [facility] [place]  :  Assessment & Plan  Crohn's disease of both small and large intestine without complication (HCC)  Patient having alternating diarrhea and constipation with bloating.  Her last enterography a year ago did not reveal a stricture, but I am concerned that there might be an underlying stricture that could be causing a change in bowel habits.  She was also previously diagnosed with irritable bowel syndrome, and this could also be a possibility.  She is agreeable to repeating a CT enterography to determine the status of her inflammation.   -CT enterography  -Update labs  -Due for TB and chronic hepatitis testing, ordered and will need to be continued annually  -Patient wants to defer steroids at this time since she does not feel her symptoms are severe, however pending results of her blood work and enterography this might be necessary  Patient has follow-up scheduled with Dr. Nevarez in August.  Further recommendations will be made after above.    History of Present Illness   HPI  Ju Stacy is a 40 y.o. female known to our practice for history of small bowel Crohn's disease in the terminal ileum/ileum currently on Skyrizi.  Previously failed Humira.  Patient presents to the office today as she has been experiencing alternating diarrhea and constipation with bloating.  Reports that she is frustrated that despite having vomiting and diarrhea she is not losing weight.  There were discussions about her starting GLP-1 agonist, but due to cost she never started.  Reports that she also has a history of uterine fibroids and was recommended a hysterectomy several years ago.  She is passing gas.  She reports cramping while injecting Skyrizi.  Reports that she has done 2 on the body injector's.  Reports  "that she felt much better when she was getting the loading doses, and feels that the effects are faltering.    Patient's last colonoscopy was in May 2024 when she was diagnosed with terminal ileitis.  CT enterography results were as follows: \"Active inflammatory small bowel Crohn's disease without luminal narrowing. Long segment of severe active inflammation in the distal/terminal ileum measuring 14.6 cm in length. \"      Review of Systems   Constitutional:  Positive for appetite change and unexpected weight change. Negative for chills, diaphoresis and fatigue.   HENT:  Negative for sore throat and trouble swallowing.    Eyes:  Negative for discharge and redness.   Respiratory:  Negative for cough, shortness of breath and wheezing.    Cardiovascular:  Negative for chest pain and palpitations.   Gastrointestinal:  Positive for abdominal distention, abdominal pain, constipation, diarrhea, nausea and vomiting. Negative for anal bleeding, blood in stool and rectal pain.   Endocrine: Negative for cold intolerance and heat intolerance.   Musculoskeletal:  Negative for joint swelling and myalgias.   Skin:  Negative for pallor and rash.   Neurological:  Negative for dizziness, tremors, weakness, light-headedness, numbness and headaches.   Hematological:  Negative for adenopathy. Does not bruise/bleed easily.   Psychiatric/Behavioral:  Negative for behavioral problems, confusion, dysphoric mood and sleep disturbance. The patient is not nervous/anxious.           Objective   /84 (BP Location: Left arm, Patient Position: Sitting, Cuff Size: Standard)   Pulse 88   Temp 97.5 °F (36.4 °C) (Tympanic)   Resp 18   Ht 5' 3\" (1.6 m)   Wt 102 kg (225 lb)   SpO2 97%   BMI 39.86 kg/m²      Physical Exam  Constitutional:       General: She is not in acute distress.     Appearance: She is well-developed. She is not diaphoretic.   HENT:      Head: Normocephalic and atraumatic.     Eyes:      General: No scleral icterus.     " Conjunctiva/sclera: Conjunctivae normal.      Pupils: Pupils are equal, round, and reactive to light.     Neck:      Thyroid: No thyromegaly.      Trachea: No tracheal deviation.     Cardiovascular:      Rate and Rhythm: Normal rate and regular rhythm.   Pulmonary:      Effort: Pulmonary effort is normal.      Breath sounds: Normal breath sounds. No wheezing or rales.   Abdominal:      General: Bowel sounds are normal. There is distension.      Palpations: Abdomen is soft. Abdomen is not rigid. There is no mass.      Tenderness: There is abdominal tenderness. There is no guarding or rebound.     Musculoskeletal:      Cervical back: Neck supple.   Lymphadenopathy:      Cervical: No cervical adenopathy.     Skin:     General: Skin is warm and dry.      Findings: No erythema or rash.     Neurological:      Mental Status: She is alert and oriented to person, place, and time.     Psychiatric:         Behavior: Behavior normal.

## 2025-05-31 ENCOUNTER — LAB (OUTPATIENT)
Dept: LAB | Facility: CLINIC | Age: 40
End: 2025-05-31
Payer: COMMERCIAL

## 2025-05-31 DIAGNOSIS — Z13.220 LIPID SCREENING: ICD-10-CM

## 2025-05-31 DIAGNOSIS — Z00.00 ANNUAL PHYSICAL EXAM: ICD-10-CM

## 2025-05-31 DIAGNOSIS — K50.80 CROHN'S DISEASE OF BOTH SMALL AND LARGE INTESTINE WITHOUT COMPLICATION (HCC): ICD-10-CM

## 2025-05-31 LAB
ALBUMIN SERPL BCG-MCNC: 4.3 G/DL (ref 3.5–5)
ALP SERPL-CCNC: 61 U/L (ref 34–104)
ALT SERPL W P-5'-P-CCNC: 64 U/L (ref 7–52)
ANION GAP SERPL CALCULATED.3IONS-SCNC: 9 MMOL/L (ref 4–13)
AST SERPL W P-5'-P-CCNC: 41 U/L (ref 13–39)
BASOPHILS # BLD AUTO: 0.06 THOUSANDS/ÂΜL (ref 0–0.1)
BASOPHILS NFR BLD AUTO: 1 % (ref 0–1)
BILIRUB SERPL-MCNC: 0.3 MG/DL (ref 0.2–1)
BUN SERPL-MCNC: 15 MG/DL (ref 5–25)
CALCIUM SERPL-MCNC: 9.3 MG/DL (ref 8.4–10.2)
CHLORIDE SERPL-SCNC: 106 MMOL/L (ref 96–108)
CHOLEST SERPL-MCNC: 196 MG/DL (ref ?–200)
CO2 SERPL-SCNC: 25 MMOL/L (ref 21–32)
CREAT SERPL-MCNC: 0.86 MG/DL (ref 0.6–1.3)
CRP SERPL QL: 7.6 MG/L
EOSINOPHIL # BLD AUTO: 0.24 THOUSAND/ÂΜL (ref 0–0.61)
EOSINOPHIL NFR BLD AUTO: 4 % (ref 0–6)
ERYTHROCYTE [DISTWIDTH] IN BLOOD BY AUTOMATED COUNT: 14 % (ref 11.6–15.1)
GFR SERPL CREATININE-BSD FRML MDRD: 84 ML/MIN/1.73SQ M
GLUCOSE P FAST SERPL-MCNC: 108 MG/DL (ref 65–99)
HCT VFR BLD AUTO: 45.1 % (ref 34.8–46.1)
HDLC SERPL-MCNC: 44 MG/DL
HGB BLD-MCNC: 13.8 G/DL (ref 11.5–15.4)
IMM GRANULOCYTES # BLD AUTO: 0.05 THOUSAND/UL (ref 0–0.2)
IMM GRANULOCYTES NFR BLD AUTO: 1 % (ref 0–2)
LDLC SERPL CALC-MCNC: 120 MG/DL (ref 0–100)
LYMPHOCYTES # BLD AUTO: 1.25 THOUSANDS/ÂΜL (ref 0.6–4.47)
LYMPHOCYTES NFR BLD AUTO: 19 % (ref 14–44)
MCH RBC QN AUTO: 28.8 PG (ref 26.8–34.3)
MCHC RBC AUTO-ENTMCNC: 30.6 G/DL (ref 31.4–37.4)
MCV RBC AUTO: 94 FL (ref 82–98)
MONOCYTES # BLD AUTO: 0.53 THOUSAND/ÂΜL (ref 0.17–1.22)
MONOCYTES NFR BLD AUTO: 8 % (ref 4–12)
NEUTROPHILS # BLD AUTO: 4.39 THOUSANDS/ÂΜL (ref 1.85–7.62)
NEUTS SEG NFR BLD AUTO: 67 % (ref 43–75)
NONHDLC SERPL-MCNC: 152 MG/DL
NRBC BLD AUTO-RTO: 0 /100 WBCS
PLATELET # BLD AUTO: 330 THOUSANDS/UL (ref 149–390)
PMV BLD AUTO: 10.7 FL (ref 8.9–12.7)
POTASSIUM SERPL-SCNC: 4.1 MMOL/L (ref 3.5–5.3)
PROT SERPL-MCNC: 7.7 G/DL (ref 6.4–8.4)
RBC # BLD AUTO: 4.8 MILLION/UL (ref 3.81–5.12)
SODIUM SERPL-SCNC: 140 MMOL/L (ref 135–147)
TRIGL SERPL-MCNC: 159 MG/DL (ref ?–150)
WBC # BLD AUTO: 6.52 THOUSAND/UL (ref 4.31–10.16)

## 2025-05-31 PROCEDURE — 87340 HEPATITIS B SURFACE AG IA: CPT

## 2025-05-31 PROCEDURE — 86480 TB TEST CELL IMMUN MEASURE: CPT

## 2025-05-31 PROCEDURE — 80053 COMPREHEN METABOLIC PANEL: CPT

## 2025-05-31 PROCEDURE — 85025 COMPLETE CBC W/AUTO DIFF WBC: CPT

## 2025-05-31 PROCEDURE — 86704 HEP B CORE ANTIBODY TOTAL: CPT

## 2025-05-31 PROCEDURE — 80061 LIPID PANEL: CPT

## 2025-05-31 PROCEDURE — 86140 C-REACTIVE PROTEIN: CPT

## 2025-05-31 PROCEDURE — 86705 HEP B CORE ANTIBODY IGM: CPT

## 2025-05-31 PROCEDURE — 86803 HEPATITIS C AB TEST: CPT

## 2025-06-01 ENCOUNTER — RESULTS FOLLOW-UP (OUTPATIENT)
Dept: GASTROENTEROLOGY | Facility: CLINIC | Age: 40
End: 2025-06-01

## 2025-06-01 LAB
GAMMA INTERFERON BACKGROUND BLD IA-ACNC: 0.04 IU/ML
HBV CORE AB SER QL: NORMAL
HBV CORE IGM SER QL: NORMAL
HBV SURFACE AG SER QL: NORMAL
HCV AB SER QL: NORMAL
M TB IFN-G BLD-IMP: NEGATIVE
M TB IFN-G CD4+ BCKGRND COR BLD-ACNC: 0 IU/ML
M TB IFN-G CD4+ BCKGRND COR BLD-ACNC: 0.01 IU/ML
MITOGEN IGNF BCKGRD COR BLD-ACNC: 2.27 IU/ML

## 2025-06-02 ENCOUNTER — RESULTS FOLLOW-UP (OUTPATIENT)
Dept: FAMILY MEDICINE CLINIC | Facility: CLINIC | Age: 40
End: 2025-06-02

## 2025-06-02 DIAGNOSIS — R74.8 ELEVATED LIVER ENZYMES: Primary | ICD-10-CM

## 2025-06-02 DIAGNOSIS — R73.01 IMPAIRED FASTING BLOOD SUGAR: ICD-10-CM

## 2025-06-02 LAB — CORTIS AM PEAK SERPL-MCNC: 14.7 UG/DL (ref 6.7–22.6)

## 2025-06-02 NOTE — TELEPHONE ENCOUNTER
Called & spoke to patient. Gave patient results as per Jeni STRICKLAND  . Patient stated that she did schedule the MRI ENTEROGRAPHY..     Patient voiced understanding and had no further questions or concerns

## 2025-06-03 ENCOUNTER — RESULTS FOLLOW-UP (OUTPATIENT)
Age: 40
End: 2025-06-03

## 2025-06-03 DIAGNOSIS — E66.812 CLASS 2 SEVERE OBESITY DUE TO EXCESS CALORIES WITH SERIOUS COMORBIDITY AND BODY MASS INDEX (BMI) OF 39.0 TO 39.9 IN ADULT (HCC): Primary | ICD-10-CM

## 2025-06-03 DIAGNOSIS — E66.01 CLASS 2 SEVERE OBESITY DUE TO EXCESS CALORIES WITH SERIOUS COMORBIDITY AND BODY MASS INDEX (BMI) OF 39.0 TO 39.9 IN ADULT (HCC): Primary | ICD-10-CM

## 2025-06-03 RX ORDER — DEXAMETHASONE 1 MG
1 TABLET ORAL ONCE
Qty: 1 TABLET | Refills: 0 | Status: SHIPPED | OUTPATIENT
Start: 2025-06-03 | End: 2025-06-03

## 2025-06-11 ENCOUNTER — APPOINTMENT (OUTPATIENT)
Dept: LAB | Facility: CLINIC | Age: 40
End: 2025-06-11
Payer: COMMERCIAL

## 2025-06-11 DIAGNOSIS — R74.8 ELEVATED LIVER ENZYMES: ICD-10-CM

## 2025-06-11 DIAGNOSIS — R73.01 IMPAIRED FASTING BLOOD SUGAR: ICD-10-CM

## 2025-06-11 LAB
ALBUMIN SERPL BCG-MCNC: 4.6 G/DL (ref 3.5–5)
ALP SERPL-CCNC: 63 U/L (ref 34–104)
ALT SERPL W P-5'-P-CCNC: 53 U/L (ref 7–52)
AST SERPL W P-5'-P-CCNC: 43 U/L (ref 13–39)
BILIRUB DIRECT SERPL-MCNC: 0.18 MG/DL (ref 0–0.2)
BILIRUB SERPL-MCNC: 0.46 MG/DL (ref 0.2–1)
CORTIS AM PEAK SERPL-MCNC: 0.6 UG/DL (ref 6.7–22.6)
PROT SERPL-MCNC: 8.1 G/DL (ref 6.4–8.4)

## 2025-06-11 PROCEDURE — 36415 COLL VENOUS BLD VENIPUNCTURE: CPT

## 2025-06-11 PROCEDURE — 80299 QUANTITATIVE ASSAY DRUG: CPT

## 2025-06-11 PROCEDURE — 80076 HEPATIC FUNCTION PANEL: CPT

## 2025-06-11 PROCEDURE — 82533 TOTAL CORTISOL: CPT

## 2025-06-11 PROCEDURE — 83036 HEMOGLOBIN GLYCOSYLATED A1C: CPT

## 2025-06-12 ENCOUNTER — RESULTS FOLLOW-UP (OUTPATIENT)
Dept: FAMILY MEDICINE CLINIC | Facility: CLINIC | Age: 40
End: 2025-06-12

## 2025-06-12 LAB
EST. AVERAGE GLUCOSE BLD GHB EST-MCNC: 117 MG/DL
HBA1C MFR BLD: 5.7 %

## 2025-06-19 ENCOUNTER — HOSPITAL ENCOUNTER (OUTPATIENT)
Dept: MRI IMAGING | Facility: HOSPITAL | Age: 40
End: 2025-06-19
Attending: PHYSICIAN ASSISTANT
Payer: COMMERCIAL

## 2025-06-19 DIAGNOSIS — K50.80 CROHN'S DISEASE OF BOTH SMALL AND LARGE INTESTINE WITHOUT COMPLICATION (HCC): ICD-10-CM

## 2025-06-19 PROCEDURE — 74183 MRI ABD W/O CNTR FLWD CNTR: CPT

## 2025-06-19 PROCEDURE — A9585 GADOBUTROL INJECTION: HCPCS | Performed by: PHYSICIAN ASSISTANT

## 2025-06-19 PROCEDURE — 72197 MRI PELVIS W/O & W/DYE: CPT

## 2025-06-19 RX ORDER — GADOBUTROL 604.72 MG/ML
10 INJECTION INTRAVENOUS
Status: COMPLETED | OUTPATIENT
Start: 2025-06-19 | End: 2025-06-19

## 2025-06-19 RX ADMIN — GADOBUTROL 10 ML: 604.72 INJECTION INTRAVENOUS at 10:38

## 2025-06-19 RX ADMIN — GLUCAGON 1 MG: KIT at 09:00

## 2025-06-20 DIAGNOSIS — K91.30 SMALL BOWEL ANASTOMOTIC STRICTURE: Primary | ICD-10-CM

## 2025-06-20 DIAGNOSIS — K50.80 CROHN'S DISEASE OF BOTH SMALL AND LARGE INTESTINE WITHOUT COMPLICATION (HCC): ICD-10-CM

## 2025-06-20 DIAGNOSIS — K91.89 SMALL BOWEL ANASTOMOTIC STRICTURE: Primary | ICD-10-CM

## 2025-06-20 LAB — DEXAMETHASONE SERPL-MCNC: 293 NG/DL

## 2025-06-25 ENCOUNTER — TELEPHONE (OUTPATIENT)
Age: 40
End: 2025-06-25

## 2025-06-25 NOTE — TELEPHONE ENCOUNTER
Patients GI provider:  Dr. Nevarez / MIKE Handy    Number to return call: [phone]    Reason for call: Pt scheduled with [facility] 7/10/25 for Consult, placed on GLP-1 by weight management. She did one injection on Saturday and it did not agree with her.  She would like to know if she should stop or keep going. WM told her to let the surgeon decide.  Please return her call to discuss/advise    Scheduled procedure/appointment date if applicable: 7/10/25

## 2025-07-10 ENCOUNTER — TELEPHONE (OUTPATIENT)
Age: 40
End: 2025-07-10

## 2025-07-10 ENCOUNTER — TELEPHONE (OUTPATIENT)
Dept: ANESTHESIOLOGY | Facility: CLINIC | Age: 40
End: 2025-07-10

## 2025-07-10 ENCOUNTER — OFFICE VISIT (OUTPATIENT)
Age: 40
End: 2025-07-10
Payer: COMMERCIAL

## 2025-07-10 VITALS — HEIGHT: 66 IN | BODY MASS INDEX: 36.32 KG/M2 | WEIGHT: 226 LBS

## 2025-07-10 DIAGNOSIS — K50.012 CROHN'S DISEASE OF ILEUM WITH INTESTINAL OBSTRUCTION (HCC): ICD-10-CM

## 2025-07-10 DIAGNOSIS — Z01.818 PRE-OP EVALUATION: Primary | ICD-10-CM

## 2025-07-10 DIAGNOSIS — K50.012 CROHN'S DISEASE OF ILEUM WITH INTESTINAL OBSTRUCTION (HCC): Primary | ICD-10-CM

## 2025-07-10 PROBLEM — K50.80 CROHN'S DISEASE OF BOTH SMALL AND LARGE INTESTINE WITHOUT COMPLICATION (HCC): Status: RESOLVED | Noted: 2024-12-23 | Resolved: 2025-07-10

## 2025-07-10 PROCEDURE — 99205 OFFICE O/P NEW HI 60 MIN: CPT | Performed by: COLON & RECTAL SURGERY

## 2025-07-10 RX ORDER — NEOMYCIN SULFATE 500 MG/1
1000 TABLET ORAL 3 TIMES DAILY
OUTPATIENT
Start: 2025-07-10 | End: 2025-07-11

## 2025-07-10 RX ORDER — METRONIDAZOLE 250 MG/1
500 TABLET ORAL 3 TIMES DAILY
OUTPATIENT
Start: 2025-07-10 | End: 2025-07-11

## 2025-07-10 RX ORDER — SODIUM CHLORIDE, SODIUM LACTATE, POTASSIUM CHLORIDE, CALCIUM CHLORIDE 600; 310; 30; 20 MG/100ML; MG/100ML; MG/100ML; MG/100ML
20 INJECTION, SOLUTION INTRAVENOUS CONTINUOUS
OUTPATIENT
Start: 2025-07-10

## 2025-07-10 RX ORDER — HEPARIN SODIUM 5000 [USP'U]/ML
5000 INJECTION, SOLUTION INTRAVENOUS; SUBCUTANEOUS ONCE
OUTPATIENT
Start: 2025-07-10 | End: 2025-07-10

## 2025-07-10 NOTE — PROGRESS NOTES
Colon and Rectal Surgery   Ju Stacy 40 y.o. female MRN [id number]  Encounter: [id number]  07/10/25 10:44 AM            Assessment: Ju Stacy is a 40 y.o. female who has Crohn's ileitis.       Plan:   Crohn's disease of ileum with intestinal obstruction (HCC)  The patient presents for evaluation of Crohn's ileitis.  She had had listed diagnosis of Crohn's of the small and large intestine but I viewed the CT scan and colonoscopy reports which show no evidence of colonic disease.  She is having worsening symptoms over the last couple of years with daily bloating, nausea and vomiting, and diarrhea.  Dr. Nevarez feels that the disease is becoming chronic stricturing, scarring disease rather than inflammatory disease and that bowel resection will help to reset her for future care.    The CT scans, multiple, were reviewed.  There is a an isolated terminal ileal segment of narrowing that is a good target for resection with ileocecectomy.  This could be done laparoscopically or robotically.  Investigation will be done to rule out other areas of disease at the time of surgery.    Risks of surgery, including but not limited to bleeding, need for transfusion of blood products, pain, infection, hernia formation, injury to the ureter or other internal organs requiring surgery specific to these injuries, anastomotic leak, bowel obstruction, bladder dysfunction, deep vein thrombosis, renal failure, pulmonary embolism, myocardial infarction or heart failure, stroke, death, need for and enterostomy, or other unspecified complications were discussed. Benefits and alternatives were discussed. Questions were answered.  She and her  agree to surgery.    She can perform high METS without symptoms.      Subjective     HPI    Ju Stacy is a 40 y.o. female new patient who is here for reviewing MRI results for history of Crohn' disease.      Her last colonoscopy was 5/2/2024 by   "Hermon    Historical Information   Past Medical History[1]  Past Surgical History[2]    Meds/Allergies     Current Medications[3]  Allergies[4]    Social History   Social History     Substance and Sexual Activity   Drug Use Never     Tobacco Use History[5]      Family History[6]      Review of Systems   Constitutional: Negative.    Respiratory: Negative.     Cardiovascular: Negative.    Gastrointestinal:  Positive for abdominal distention, abdominal pain, diarrhea and nausea. Negative for anal bleeding, blood in stool, constipation and rectal pain.       Objective   Current Vitals:  Vitals:    07/10/25 0926   Weight: 103 kg (226 lb)   Height: 5' 6\" (1.676 m)         Physical Exam  Constitutional:       Appearance: Normal appearance.     Cardiovascular:      Rate and Rhythm: Normal rate and regular rhythm.   Pulmonary:      Effort: Pulmonary effort is normal.      Breath sounds: Normal breath sounds.   Abdominal:      General: Abdomen is flat.      Palpations: Abdomen is soft. There is no mass.      Tenderness: There is no abdominal tenderness. There is no guarding.     Neurological:      General: No focal deficit present.      Mental Status: She is alert and oriented to person, place, and time.     Psychiatric:         Mood and Affect: Mood normal.         Behavior: Behavior normal.         Thought Content: Thought content normal.         Judgment: Judgment normal.                    [1]   Past Medical History:  Diagnosis Date    Abdominal hernia     Crohn's colitis (HCC)     Disease of thyroid gland     Stress incontinence     Varicella     as child    [2]   Past Surgical History:  Procedure Laterality Date    COLPOSCOPY  2002    GYNECOLOGIC CRYOSURGERY  2002   [3]   Current Outpatient Medications:     risankizumab-rzaa (SKYRIZI) 360 MG/2.4ML SOCT, Inject 2.4 mL (360 mg total) under the skin every 56 days, Disp: 2.4 mL, Rfl: 5    clotrimazole-betamethasone (LOTRISONE) 1-0.05 % cream, Apply topically 2 (two) times " a day (Patient not taking: Reported on 7/10/2025), Disp: 15 g, Rfl: 0    hyoscyamine (LEVSIN/SL) 0.125 mg SL tablet, Take 1 tablet (0.125 mg total) by mouth every 6 (six) hours as needed for cramping or diarrhea (Patient not taking: Reported on 7/10/2025), Disp: 45 tablet, Rfl: 3  [4] No Known Allergies  [5]   Social History  Tobacco Use   Smoking Status Never   Smokeless Tobacco Never   [6]   Family History  Problem Relation Name Age of Onset    Asthma Mother Olivia alegriash     Hypertension Father      Hyperlipidemia Father      Thyroid disease Father      No Known Problems Sister      No Known Problems Sister      No Known Problems Brother      No Known Problems Son      Colon cancer Maternal Uncle      Heart disease Maternal Grandmother      Alzheimer's disease Maternal Grandfather

## 2025-07-10 NOTE — TELEPHONE ENCOUNTER
Patient scheduled for surgery  8/12/2025  at [facility]. Instructions and PAT's gone over with and handed to the patient.   Post Op  SOC  Antibiotics   Labs    ----- Message from CHARLOTTE Castañeda MD sent at 7/10/2025 10:45 AM EDT -----  OR

## 2025-07-10 NOTE — LETTER
Ju Stacy  [address] [facility] [address]        7/10/2025    Dear Ju Stacy,    Your surgery is scheduled for: 8/12/2025   The hospital will call the evening prior to your surgery with your expected arrival time.   Location:[address] [address]    CHECK LIST PRIOR TO INPATIENT SURGERY  It is your responsibility to obtain any/all referrals needed for your surgery if required by your insurance. Our office will contact you to discuss your insurance coverage for this procedure.    Special instructions required if you are taking any blood thinners. Please verify with the prescribing physician. Examples include Coumadin, Plavix, Xarelto, Eliquis, Pradaxa, etc.    Please check with your family physician if you are taking the following medications: Aspirin or any Aspirin containing medication, Gingko biloba, Ginseng, Feverfew, and/or Movico’s Wort. We suggest stopping these for 3 days.     The night before and the day of your surgery, wash from your neck to groin with chlorhexidine soap.  This soap is available at most retail pharmacies under such brand names as Hibiclens, Endure or Aplicare.    Pre-admission testing Required: YES x NO     If Yes, what type:  BLOOD-WORK x        Other Clearances/ Additional Testing: NONE    BOWEL PREPARATION REQUIRED: YES x NO   If yes, start date: 8/11/2025. Please see attached bowel preparation instructions.    NOTHING TO EAT OR DRINK AFTER MIDNIGHT PRIOR TO SURGERY.    Please do not hesitate to call our office with any questions regarding your surgery.               MIRALAX/GATORADE SURGERY PREPARATION INSTRUCTIONS  DAY BEFORE SURGERY:  Have a normal breakfast and clear liquids thereafter. It is important that you drink plenty of clear liquids throughout the day to prevent dehydration. Nothing red, orange or purple    You MAY have:  Soda  Water  Broth Gatorade  Jell-O  Popsicles Coffee/Tea without  milk/creamer     YOU MAY NOT HAVE:  Solid Foods  Milk and milk products  Juice with pulp    BOWEL PREPARATION: Includes: One (1) 238-gram container of Miralax (polyethylene glycol 3350), four (4) 5 mg Dulcolax (bisacodyl) tablets, and 64-ounces of Gatorade (sports drink). Preparation may be refrigerated. Entire bowel prep should be completed.    **REMEMBER** A prescription for antibiotics has been called into your pharmacy for  prior to your surgery.    At 1:00 pm, take (4) Dulcolax Tablets with 8 oz. of water.  Swallow the tablets whole with a full glass of water.  The package may direct you not to exceed (2) tablets at any time but for the purpose of this examination, you should take (4).    At 1:00 pm, Mix the 238g bottle of MiraLax in 64 oz. of Gatorade and shake the solution until the MiraLax is dissolved.  Drink 8 oz. glassfuls at your own pace.  It may take 3-4 hours to drink all the solution.    At 1:00 pm, take your first dose of antibiotic as prescribed.    At 2:00 pm, take your second dose of antibiotic as prescribed.    At 10:00 pm, take your last dose of antibiotic as prescribed.  REMEMBER TO STAY CLOSE TO TOILET FACILITIES.    DAY OF SURGERY  NOTHING TO EAT OR DRINK (INCLUDING CHEWING GUM) AFTER 12 MIDNIGHT PRIOR TO YOUR SURGERY EXCEPT YOUR PRE-SURGERY ENSURE DRINK (IF APPLICABLE).  For any questions or concerns related to your bowel preparation or pre-procedure instructions, please contact our office. Thank you for choosing [facility]'s Colon & Rectal Surgery!                             Post-Operative Care Information  Abdominal Surgery  What are my post-operative instructions?   The following information and instructions are for your continued care after discharge from the hospital. Please read the information (including the After Visit Summary provided to you at the time of discharge) carefully. If you have any questions or concerns, please contact the clinical staff at [phone]    How do  I take care of my incision?  Your incision(s) may have surgical glue, dissolvable sutures with white pieces of tape called steri strips, or staples.   If you have steri strips or surgical glue, do not remove them. Steri strips will fall off naturally in 7-10 days. Surgical glue (Exofin) will fall off over a period of up to 2-3 weeks.   Do not put any topical ointments or lotions on the incisions.   Avoid tight clothing around your incision(s) or fabric that may irritate your skin such as wool, hooks and antionette.     Should I continue taking my prescribed medications?   Take medications as prescribed. Please review the After Visit Summary provided to you at the time of discharge for details.     How will I manage my pain at home?  For best pain control take your pain medication at regular intervals for the first couple of days, about every 4-6 hours. This will prevent pain build-up, which occurs when medication is taken on an as needed basis.   Use only as much prescription pain medication as you need (i.e. 1 tablet instead of 2).  Taper off the prescription pain medication as pain decreases, stopping narcotics first.   Use over-the-counter acetaminophen (Tylenolâ) if your doctor allows. When using over-the-counter medication, never use more than what is prescribed on the package directions. Do not take more than 3000mg of Tylenolâ in a 24 hour period. Do not take more than 2400mg of Ibuprofen (such as Motrinâ or Advilâ) in a 24 hour period.   While taking prescription pain medication you may not drive, work, or drink alcohol.     Are there any diet restrictions?   Continue low fiber diet up to 1 week post op.  (This allows the bowel to rest)    It is normal for bowel movements to be loose and occur more frequently post-operatively. This should improve over time.  During this time pay attention to hydration  1.5 weeks post op you may slowly begin to add fiber back into your diet.    By 2 weeks post op you may resume  a normal high fiber diet.     What activity restrictions will I have?   Walk as much as tolerated.  Do not lift, pull, or push objects greater than 10 pounds for 6 weeks. This includes vacuuming, lifting children or groceries, walking the dog, mowing lawns, snow shoveling, etc. Please discuss other specific physical activities with the doctor at your post op appointment.   Do not drive while taking pain medications. You may drive when you have no pain - usually in 1-2 weeks following surgery.   You may climb stairs in moderation but do not overtire.  Resume sexual activity after you are cleared by your doctor.     When will I receive follow-up care?   You will be scheduled to return to see your physician in the office typically in 3-4 weeks after surgery.    If you do not have a scheduled appointment at the time of discharge, please call the office at [phone].    When should I call my doctor?   Notify your doctor for any of the following signs and symptoms of possible infection:   Temperature above 101 degrees.   Increase in pain or discomfort.   Redness, swelling, drainage at incision site(s). A small amount of clear yellow or pinkish-yellow drainage is normal  If incision begins to open.     Call if you have any changes in your overall health such as having:   Nausea   Vomiting   Chills   profuse (excessive) sweating   inability to urinate or completely empty bladder   diarrhea   constipation

## 2025-07-10 NOTE — PROGRESS NOTES
Colon and Rectal Surgery   Ju Stacy 40 y.o. female MRN [id number]  Encounter: [id number]  07/10/25 8:05 AM            Assessment: Ju Stacy is a 40 y.o. female who ***      Plan:   No problem-specific Assessment & Plan notes found for this encounter.      Subjective     HPI    Ju Stacy is a 40 y.o. female new patient who is here for a history of Crohn's      Her last colonoscopy was 5/2/2024 by Dr. Nevarez    Historical Information   Past Medical History[1]  Past Surgical History[2]    Meds/Allergies     Current Medications[3]  Allergies[4]    Social History   Social History     Substance and Sexual Activity   Drug Use Never     Tobacco Use History[5]      Family History[6]      Review of Systems    Objective   Current Vitals:  There were no vitals filed for this visit.      Physical Exam               [1]   Past Medical History:  Diagnosis Date    Abdominal hernia     Crohn's colitis (HCC)     Disease of thyroid gland     Stress incontinence     Varicella     as child    [2]   Past Surgical History:  Procedure Laterality Date    COLPOSCOPY  2002    GYNECOLOGIC CRYOSURGERY  2002   [3]   Current Outpatient Medications:     clotrimazole-betamethasone (LOTRISONE) 1-0.05 % cream, Apply topically 2 (two) times a day (Patient not taking: Reported on 5/29/2025), Disp: 15 g, Rfl: 0    hyoscyamine (LEVSIN/SL) 0.125 mg SL tablet, Take 1 tablet (0.125 mg total) by mouth every 6 (six) hours as needed for cramping or diarrhea, Disp: 45 tablet, Rfl: 3    risankizumab-rzaa (SKYRIZI) 360 MG/2.4ML SOCT, Inject 2.4 mL (360 mg total) under the skin every 56 days, Disp: 2.4 mL, Rfl: 5  [4] No Known Allergies  [5]   Social History  Tobacco Use   Smoking Status Never   Smokeless Tobacco Never   [6]   Family History  Problem Relation Name Age of Onset    Asthma Mother Olivia herlinda     Hypertension Father      Hyperlipidemia Father      Thyroid disease Father      No Known Problems Sister      No Known  Problems Sister      No Known Problems Brother      No Known Problems Son      Colon cancer Maternal Uncle      Heart disease Maternal Grandmother      Alzheimer's disease Maternal Grandfather

## 2025-07-10 NOTE — ASSESSMENT & PLAN NOTE
The patient presents for evaluation of Crohn's ileitis.  She had had listed diagnosis of Crohn's of the small and large intestine but I viewed the CT scan and colonoscopy reports which show no evidence of colonic disease.  She is having worsening symptoms over the last couple of years with daily bloating, nausea and vomiting, and diarrhea.  Dr. Nevarez feels that the disease is becoming chronic stricturing, scarring disease rather than inflammatory disease and that bowel resection will help to reset her for future care.    The CT scans, multiple, were reviewed.  There is a an isolated terminal ileal segment of narrowing that is a good target for resection with ileocecectomy.  This could be done laparoscopically or robotically.  Investigation will be done to rule out other areas of disease at the time of surgery.    Risks of surgery, including but not limited to bleeding, need for transfusion of blood products, pain, infection, hernia formation, injury to the ureter or other internal organs requiring surgery specific to these injuries, anastomotic leak, bowel obstruction, bladder dysfunction, deep vein thrombosis, renal failure, pulmonary embolism, myocardial infarction or heart failure, stroke, death, need for and enterostomy, or other unspecified complications were discussed. Benefits and alternatives were discussed. Questions were answered.  She and her  agree to surgery.    She can perform high METS without symptoms.

## 2025-07-14 PROBLEM — Z91.89 AT HIGH RISK FOR ALTERATION IN NUTRITION: Status: ACTIVE | Noted: 2025-07-14

## 2025-07-15 ENCOUNTER — CONSULT (OUTPATIENT)
Dept: ANESTHESIOLOGY | Facility: CLINIC | Age: 40
End: 2025-07-15
Attending: COLON & RECTAL SURGERY
Payer: COMMERCIAL

## 2025-07-15 VITALS
WEIGHT: 226.3 LBS | BODY MASS INDEX: 40.1 KG/M2 | DIASTOLIC BLOOD PRESSURE: 80 MMHG | OXYGEN SATURATION: 98 % | HEART RATE: 90 BPM | HEIGHT: 63 IN | SYSTOLIC BLOOD PRESSURE: 132 MMHG

## 2025-07-15 DIAGNOSIS — Z91.89 AT HIGH RISK FOR ALTERATION IN NUTRITION: ICD-10-CM

## 2025-07-15 DIAGNOSIS — Z01.818 PRE-OP EVALUATION: ICD-10-CM

## 2025-07-15 DIAGNOSIS — K50.012 CROHN'S DISEASE OF ILEUM WITH INTESTINAL OBSTRUCTION (HCC): Primary | ICD-10-CM

## 2025-07-15 PROCEDURE — 99212 OFFICE O/P EST SF 10 MIN: CPT | Performed by: NURSE PRACTITIONER

## 2025-07-29 ENCOUNTER — ANESTHESIA EVENT (OUTPATIENT)
Dept: PERIOP | Facility: HOSPITAL | Age: 40
End: 2025-07-29
Payer: COMMERCIAL

## 2025-07-29 ENCOUNTER — APPOINTMENT (OUTPATIENT)
Dept: LAB | Facility: CLINIC | Age: 40
End: 2025-07-29
Payer: COMMERCIAL

## 2025-07-29 DIAGNOSIS — E06.3 HYPOTHYROIDISM DUE TO HASHIMOTO THYROIDITIS: ICD-10-CM

## 2025-07-29 DIAGNOSIS — K50.012 CROHN'S DISEASE OF ILEUM WITH INTESTINAL OBSTRUCTION (HCC): ICD-10-CM

## 2025-07-29 PROCEDURE — 85025 COMPLETE CBC W/AUTO DIFF WBC: CPT

## 2025-07-29 PROCEDURE — 86901 BLOOD TYPING SEROLOGIC RH(D): CPT | Performed by: COLON & RECTAL SURGERY

## 2025-07-29 PROCEDURE — 80053 COMPREHEN METABOLIC PANEL: CPT

## 2025-07-29 PROCEDURE — 36415 COLL VENOUS BLD VENIPUNCTURE: CPT

## 2025-07-29 PROCEDURE — 86900 BLOOD TYPING SEROLOGIC ABO: CPT | Performed by: COLON & RECTAL SURGERY

## 2025-07-29 PROCEDURE — 84443 ASSAY THYROID STIM HORMONE: CPT

## 2025-07-29 PROCEDURE — 86850 RBC ANTIBODY SCREEN: CPT | Performed by: COLON & RECTAL SURGERY

## 2025-07-29 PROCEDURE — 84439 ASSAY OF FREE THYROXINE: CPT

## 2025-07-30 ENCOUNTER — LAB REQUISITION (OUTPATIENT)
Dept: LAB | Facility: HOSPITAL | Age: 40
End: 2025-07-30
Payer: COMMERCIAL

## 2025-07-30 DIAGNOSIS — E06.3 AUTOIMMUNE THYROIDITIS: ICD-10-CM

## 2025-07-30 LAB
ALBUMIN SERPL BCG-MCNC: 4 G/DL (ref 3.5–5)
ALP SERPL-CCNC: 59 U/L (ref 34–104)
ALT SERPL W P-5'-P-CCNC: 77 U/L (ref 7–52)
ANION GAP SERPL CALCULATED.3IONS-SCNC: 10 MMOL/L (ref 4–13)
AST SERPL W P-5'-P-CCNC: 70 U/L (ref 13–39)
BASOPHILS # BLD AUTO: 0.03 THOUSANDS/ÂΜL (ref 0–0.1)
BASOPHILS NFR BLD AUTO: 0 % (ref 0–1)
BILIRUB SERPL-MCNC: 0.45 MG/DL (ref 0.2–1)
BUN SERPL-MCNC: 12 MG/DL (ref 5–25)
CALCIUM SERPL-MCNC: 9.4 MG/DL (ref 8.4–10.2)
CHLORIDE SERPL-SCNC: 104 MMOL/L (ref 96–108)
CO2 SERPL-SCNC: 23 MMOL/L (ref 21–32)
CREAT SERPL-MCNC: 0.81 MG/DL (ref 0.6–1.3)
EOSINOPHIL # BLD AUTO: 0.27 THOUSAND/ÂΜL (ref 0–0.61)
EOSINOPHIL NFR BLD AUTO: 4 % (ref 0–6)
ERYTHROCYTE [DISTWIDTH] IN BLOOD BY AUTOMATED COUNT: 14.5 % (ref 11.6–15.1)
GFR SERPL CREATININE-BSD FRML MDRD: 90 ML/MIN/1.73SQ M
GLUCOSE P FAST SERPL-MCNC: 94 MG/DL (ref 65–99)
HCT VFR BLD AUTO: 42 % (ref 34.8–46.1)
HGB BLD-MCNC: 13.9 G/DL (ref 11.5–15.4)
IMM GRANULOCYTES # BLD AUTO: 0.04 THOUSAND/UL (ref 0–0.2)
IMM GRANULOCYTES NFR BLD AUTO: 1 % (ref 0–2)
LYMPHOCYTES # BLD AUTO: 1.06 THOUSANDS/ÂΜL (ref 0.6–4.47)
LYMPHOCYTES NFR BLD AUTO: 16 % (ref 14–44)
MCH RBC QN AUTO: 30 PG (ref 26.8–34.3)
MCHC RBC AUTO-ENTMCNC: 33.1 G/DL (ref 31.4–37.4)
MCV RBC AUTO: 91 FL (ref 82–98)
MONOCYTES # BLD AUTO: 0.46 THOUSAND/ÂΜL (ref 0.17–1.22)
MONOCYTES NFR BLD AUTO: 7 % (ref 4–12)
NEUTROPHILS # BLD AUTO: 4.98 THOUSANDS/ÂΜL (ref 1.85–7.62)
NEUTS SEG NFR BLD AUTO: 72 % (ref 43–75)
NRBC BLD AUTO-RTO: 0 /100 WBCS
PLATELET # BLD AUTO: 289 THOUSANDS/UL (ref 149–390)
PMV BLD AUTO: 10.3 FL (ref 8.9–12.7)
POTASSIUM SERPL-SCNC: 4.4 MMOL/L (ref 3.5–5.3)
PROT SERPL-MCNC: 7.1 G/DL (ref 6.4–8.4)
RBC # BLD AUTO: 4.64 MILLION/UL (ref 3.81–5.12)
SODIUM SERPL-SCNC: 137 MMOL/L (ref 135–147)
T4 FREE SERPL-MCNC: 0.72 NG/DL (ref 0.61–1.12)
TSH SERPL DL<=0.05 MIU/L-ACNC: 2.74 UIU/ML (ref 0.45–4.5)
WBC # BLD AUTO: 6.84 THOUSAND/UL (ref 4.31–10.16)

## 2025-08-04 LAB
ABO GROUP BLD: NORMAL
BLD GP AB SCN SERPL QL: NEGATIVE
RH BLD: POSITIVE
SPECIMEN EXPIRATION DATE: NORMAL

## 2025-08-12 ENCOUNTER — HOSPITAL ENCOUNTER (INPATIENT)
Facility: HOSPITAL | Age: 40
LOS: 3 days | Discharge: HOME | DRG: 330 | End: 2025-08-15
Attending: COLON & RECTAL SURGERY | Admitting: COLON & RECTAL SURGERY
Payer: COMMERCIAL

## 2025-08-12 ENCOUNTER — ANESTHESIA (OUTPATIENT)
Dept: PERIOP | Facility: HOSPITAL | Age: 40
End: 2025-08-12
Payer: COMMERCIAL

## 2025-08-12 RX ORDER — KETAMINE HCL IN NACL, ISO-OSM 100MG/10ML
SYRINGE (ML) INJECTION AS NEEDED
Status: DISCONTINUED | OUTPATIENT
Start: 2025-08-12 | End: 2025-08-12

## 2025-08-12 RX ORDER — HYDROMORPHONE HCL/PF 1 MG/ML
SYRINGE (ML) INJECTION AS NEEDED
Status: DISCONTINUED | OUTPATIENT
Start: 2025-08-12 | End: 2025-08-12

## 2025-08-12 RX ORDER — DEXAMETHASONE SODIUM PHOSPHATE 10 MG/ML
INJECTION, SOLUTION INTRAMUSCULAR; INTRAVENOUS AS NEEDED
Status: DISCONTINUED | OUTPATIENT
Start: 2025-08-12 | End: 2025-08-12

## 2025-08-12 RX ORDER — SODIUM CHLORIDE 9 MG/ML
INJECTION, SOLUTION INTRAVENOUS CONTINUOUS PRN
Status: DISCONTINUED | OUTPATIENT
Start: 2025-08-12 | End: 2025-08-12

## 2025-08-12 RX ORDER — LIDOCAINE HYDROCHLORIDE 10 MG/ML
INJECTION, SOLUTION EPIDURAL; INFILTRATION; INTRACAUDAL; PERINEURAL AS NEEDED
Status: DISCONTINUED | OUTPATIENT
Start: 2025-08-12 | End: 2025-08-12

## 2025-08-12 RX ORDER — FENTANYL CITRATE 50 UG/ML
INJECTION, SOLUTION INTRAMUSCULAR; INTRAVENOUS AS NEEDED
Status: DISCONTINUED | OUTPATIENT
Start: 2025-08-12 | End: 2025-08-12

## 2025-08-12 RX ORDER — ROCURONIUM BROMIDE 10 MG/ML
INJECTION, SOLUTION INTRAVENOUS AS NEEDED
Status: DISCONTINUED | OUTPATIENT
Start: 2025-08-12 | End: 2025-08-12

## 2025-08-12 RX ORDER — MIDAZOLAM HYDROCHLORIDE 2 MG/2ML
INJECTION, SOLUTION INTRAMUSCULAR; INTRAVENOUS AS NEEDED
Status: DISCONTINUED | OUTPATIENT
Start: 2025-08-12 | End: 2025-08-12

## 2025-08-12 RX ORDER — PROPOFOL 10 MG/ML
INJECTION, EMULSION INTRAVENOUS CONTINUOUS PRN
Status: DISCONTINUED | OUTPATIENT
Start: 2025-08-12 | End: 2025-08-12

## 2025-08-12 RX ADMIN — PROPOFOL 200 MG: 10 INJECTION, EMULSION INTRAVENOUS at 14:05

## 2025-08-12 RX ADMIN — PROPOFOL 70 MCG/KG/MIN: 10 INJECTION, EMULSION INTRAVENOUS at 14:07

## 2025-08-12 RX ADMIN — SODIUM CHLORIDE: 9 INJECTION, SOLUTION INTRAVENOUS at 14:07

## 2025-08-12 RX ADMIN — LIDOCAINE HYDROCHLORIDE 50 MG: 10 INJECTION, SOLUTION EPIDURAL; INFILTRATION; INTRACAUDAL; PERINEURAL at 14:04

## 2025-08-12 RX ADMIN — Medication 30 MG: at 15:17

## 2025-08-12 RX ADMIN — ROCURONIUM BROMIDE 20 MG: 10 INJECTION, SOLUTION INTRAVENOUS at 14:30

## 2025-08-12 RX ADMIN — METRONIDAZOLE: 500 SOLUTION INTRAVENOUS at 14:15

## 2025-08-12 RX ADMIN — FENTANYL CITRATE 50 MCG: 50 INJECTION, SOLUTION INTRAMUSCULAR; INTRAVENOUS at 14:00

## 2025-08-12 RX ADMIN — CEFAZOLIN SODIUM 2000 MG: 2 SOLUTION INTRAVENOUS at 14:10

## 2025-08-12 RX ADMIN — ROCURONIUM BROMIDE 50 MG: 10 INJECTION, SOLUTION INTRAVENOUS at 14:06

## 2025-08-12 RX ADMIN — ROCURONIUM BROMIDE 30 MG: 10 INJECTION, SOLUTION INTRAVENOUS at 14:51

## 2025-08-12 RX ADMIN — Medication 0.5 MG: at 16:44

## 2025-08-12 RX ADMIN — MIDAZOLAM HYDROCHLORIDE 2 MG: 2 INJECTION, SOLUTION INTRAMUSCULAR; INTRAVENOUS at 13:54

## 2025-08-12 RX ADMIN — SODIUM CHLORIDE, SODIUM LACTATE, POTASSIUM CHLORIDE, AND CALCIUM CHLORIDE: .6; .31; .03; .02 INJECTION, SOLUTION INTRAVENOUS at 14:42

## 2025-08-12 RX ADMIN — DEXAMETHASONE SODIUM PHOSPHATE 10 MG: 10 INJECTION, SOLUTION INTRAMUSCULAR; INTRAVENOUS at 14:22

## 2025-08-12 RX ADMIN — Medication 20 MG: at 15:02

## 2025-08-12 RX ADMIN — FENTANYL CITRATE 50 MCG: 50 INJECTION, SOLUTION INTRAMUSCULAR; INTRAVENOUS at 14:04

## 2025-08-12 RX ADMIN — ROCURONIUM BROMIDE 30 MG: 10 INJECTION, SOLUTION INTRAVENOUS at 15:17

## 2025-08-12 RX ADMIN — Medication 0.5 MG: at 14:19

## 2025-08-13 PROBLEM — K50.10 CROHN'S COLITIS (HCC): Status: ACTIVE | Noted: 2025-08-13

## 2025-08-18 ENCOUNTER — TELEMEDICINE (OUTPATIENT)
Dept: FAMILY MEDICINE CLINIC | Facility: CLINIC | Age: 40
End: 2025-08-18
Payer: COMMERCIAL

## 2025-08-18 ENCOUNTER — TELEPHONE (OUTPATIENT)
Age: 40
End: 2025-08-18

## 2025-08-18 DIAGNOSIS — B37.9 CANDIDA INFECTION: Primary | ICD-10-CM

## 2025-08-18 PROCEDURE — 99213 OFFICE O/P EST LOW 20 MIN: CPT | Performed by: NURSE PRACTITIONER

## 2025-08-18 RX ORDER — FLUCONAZOLE 150 MG/1
150 TABLET ORAL ONCE
Qty: 1 TABLET | Refills: 0 | Status: SHIPPED | OUTPATIENT
Start: 2025-08-18 | End: 2025-08-18

## 2025-08-19 ENCOUNTER — NURSE TRIAGE (OUTPATIENT)
Age: 40
End: 2025-08-19

## 2025-08-22 ENCOUNTER — OFFICE VISIT (OUTPATIENT)
Dept: FAMILY MEDICINE CLINIC | Facility: CLINIC | Age: 40
End: 2025-08-22
Payer: COMMERCIAL

## 2025-08-22 VITALS
WEIGHT: 225 LBS | HEIGHT: 63 IN | SYSTOLIC BLOOD PRESSURE: 140 MMHG | HEART RATE: 102 BPM | DIASTOLIC BLOOD PRESSURE: 84 MMHG | TEMPERATURE: 97.8 F | OXYGEN SATURATION: 99 % | BODY MASS INDEX: 39.87 KG/M2

## 2025-08-22 DIAGNOSIS — K50.012 CROHN'S DISEASE OF ILEUM WITH INTESTINAL OBSTRUCTION (HCC): Primary | ICD-10-CM

## 2025-08-22 DIAGNOSIS — L27.0 DRUG-INDUCED SKIN RASH: ICD-10-CM

## 2025-08-22 DIAGNOSIS — K50.119 CROHN'S DISEASE OF COLON WITH COMPLICATION (HCC): ICD-10-CM

## 2025-08-22 DIAGNOSIS — R52 ACUTE PAIN: ICD-10-CM

## 2025-08-22 PROBLEM — B37.9 CANDIDA INFECTION: Status: RESOLVED | Noted: 2025-08-18 | Resolved: 2025-08-22

## 2025-08-22 PROCEDURE — 99495 TRANSJ CARE MGMT MOD F2F 14D: CPT | Performed by: NURSE PRACTITIONER

## 2025-08-22 RX ORDER — HYDROMORPHONE HYDROCHLORIDE 2 MG/1
2 TABLET ORAL 2 TIMES DAILY PRN
Qty: 20 TABLET | Refills: 0 | Status: SHIPPED | OUTPATIENT
Start: 2025-08-22 | End: 2025-09-01

## 2025-08-22 RX ORDER — ASPIRIN 81 MG/1
81 TABLET, CHEWABLE ORAL DAILY
Qty: 30 TABLET | Refills: 2 | Status: SHIPPED | OUTPATIENT
Start: 2025-08-22 | End: 2025-11-20